# Patient Record
Sex: MALE | Race: WHITE | NOT HISPANIC OR LATINO | Employment: FULL TIME | URBAN - METROPOLITAN AREA
[De-identification: names, ages, dates, MRNs, and addresses within clinical notes are randomized per-mention and may not be internally consistent; named-entity substitution may affect disease eponyms.]

---

## 2017-05-16 ENCOUNTER — ALLSCRIPTS OFFICE VISIT (OUTPATIENT)
Dept: OTHER | Facility: OTHER | Age: 47
End: 2017-05-16

## 2017-05-16 DIAGNOSIS — E03.9 HYPOTHYROIDISM: ICD-10-CM

## 2017-05-16 DIAGNOSIS — E78.5 HYPERLIPIDEMIA: ICD-10-CM

## 2017-06-28 ENCOUNTER — APPOINTMENT (OUTPATIENT)
Dept: LAB | Facility: CLINIC | Age: 47
End: 2017-06-28
Payer: COMMERCIAL

## 2017-06-28 ENCOUNTER — TRANSCRIBE ORDERS (OUTPATIENT)
Dept: URGENT CARE | Facility: CLINIC | Age: 47
End: 2017-06-28

## 2017-06-28 DIAGNOSIS — E03.9 HYPOTHYROIDISM: ICD-10-CM

## 2017-06-28 DIAGNOSIS — E78.5 HYPERLIPIDEMIA: ICD-10-CM

## 2017-06-28 LAB
ALBUMIN SERPL BCP-MCNC: 3.9 G/DL (ref 3.5–5)
ALP SERPL-CCNC: 88 U/L (ref 46–116)
ALT SERPL W P-5'-P-CCNC: 26 U/L (ref 12–78)
ANION GAP SERPL CALCULATED.3IONS-SCNC: 4 MMOL/L (ref 4–13)
AST SERPL W P-5'-P-CCNC: 24 U/L (ref 5–45)
BILIRUB SERPL-MCNC: 0.89 MG/DL (ref 0.2–1)
BUN SERPL-MCNC: 14 MG/DL (ref 5–25)
CALCIUM SERPL-MCNC: 9.1 MG/DL (ref 8.3–10.1)
CHLORIDE SERPL-SCNC: 104 MMOL/L (ref 100–108)
CO2 SERPL-SCNC: 29 MMOL/L (ref 21–32)
CREAT SERPL-MCNC: 1.35 MG/DL (ref 0.6–1.3)
GFR SERPL CREATININE-BSD FRML MDRD: 57.2 ML/MIN/1.73SQ M
GLUCOSE P FAST SERPL-MCNC: 97 MG/DL (ref 65–99)
LDLC SERPL DIRECT ASSAY-MCNC: 124 MG/DL (ref 0–100)
POTASSIUM SERPL-SCNC: 4 MMOL/L (ref 3.5–5.3)
PROT SERPL-MCNC: 7.3 G/DL (ref 6.4–8.2)
SODIUM SERPL-SCNC: 137 MMOL/L (ref 136–145)
TRIGL SERPL-MCNC: 208 MG/DL
TSH SERPL DL<=0.05 MIU/L-ACNC: 39.8 UIU/ML (ref 0.36–3.74)

## 2017-06-28 PROCEDURE — 83721 ASSAY OF BLOOD LIPOPROTEIN: CPT

## 2017-06-28 PROCEDURE — 84478 ASSAY OF TRIGLYCERIDES: CPT

## 2017-06-28 PROCEDURE — 84443 ASSAY THYROID STIM HORMONE: CPT

## 2017-06-28 PROCEDURE — 36415 COLL VENOUS BLD VENIPUNCTURE: CPT

## 2017-06-28 PROCEDURE — 80053 COMPREHEN METABOLIC PANEL: CPT

## 2017-06-29 ENCOUNTER — GENERIC CONVERSION - ENCOUNTER (OUTPATIENT)
Dept: OTHER | Facility: OTHER | Age: 47
End: 2017-06-29

## 2017-10-03 ENCOUNTER — APPOINTMENT (OUTPATIENT)
Dept: PHYSICAL THERAPY | Facility: CLINIC | Age: 47
End: 2017-10-03
Payer: COMMERCIAL

## 2017-10-03 PROCEDURE — G8991 OTHER PT/OT GOAL STATUS: HCPCS

## 2017-10-03 PROCEDURE — G8990 OTHER PT/OT CURRENT STATUS: HCPCS

## 2017-10-03 PROCEDURE — 97161 PT EVAL LOW COMPLEX 20 MIN: CPT

## 2017-10-03 PROCEDURE — 97140 MANUAL THERAPY 1/> REGIONS: CPT

## 2017-10-03 PROCEDURE — 97535 SELF CARE MNGMENT TRAINING: CPT

## 2017-10-05 ENCOUNTER — APPOINTMENT (OUTPATIENT)
Dept: PHYSICAL THERAPY | Facility: CLINIC | Age: 47
End: 2017-10-05
Payer: COMMERCIAL

## 2017-10-05 PROCEDURE — 97110 THERAPEUTIC EXERCISES: CPT

## 2017-10-05 PROCEDURE — 97140 MANUAL THERAPY 1/> REGIONS: CPT

## 2017-10-09 ENCOUNTER — APPOINTMENT (OUTPATIENT)
Dept: PHYSICAL THERAPY | Facility: CLINIC | Age: 47
End: 2017-10-09
Payer: COMMERCIAL

## 2017-10-09 PROCEDURE — 97140 MANUAL THERAPY 1/> REGIONS: CPT

## 2017-10-09 PROCEDURE — 97110 THERAPEUTIC EXERCISES: CPT

## 2017-10-12 ENCOUNTER — APPOINTMENT (OUTPATIENT)
Dept: PHYSICAL THERAPY | Facility: CLINIC | Age: 47
End: 2017-10-12
Payer: COMMERCIAL

## 2017-10-12 PROCEDURE — 97140 MANUAL THERAPY 1/> REGIONS: CPT

## 2017-10-12 PROCEDURE — 97110 THERAPEUTIC EXERCISES: CPT

## 2017-10-16 ENCOUNTER — APPOINTMENT (OUTPATIENT)
Dept: PHYSICAL THERAPY | Facility: CLINIC | Age: 47
End: 2017-10-16
Payer: COMMERCIAL

## 2017-10-16 PROCEDURE — 97140 MANUAL THERAPY 1/> REGIONS: CPT

## 2017-10-16 PROCEDURE — 97110 THERAPEUTIC EXERCISES: CPT

## 2017-10-18 ENCOUNTER — APPOINTMENT (OUTPATIENT)
Dept: PHYSICAL THERAPY | Facility: CLINIC | Age: 47
End: 2017-10-18
Payer: COMMERCIAL

## 2017-10-18 PROCEDURE — 97140 MANUAL THERAPY 1/> REGIONS: CPT

## 2017-10-18 PROCEDURE — 97110 THERAPEUTIC EXERCISES: CPT

## 2017-10-23 ENCOUNTER — APPOINTMENT (OUTPATIENT)
Dept: PHYSICAL THERAPY | Facility: CLINIC | Age: 47
End: 2017-10-23
Payer: COMMERCIAL

## 2017-10-23 PROCEDURE — G8990 OTHER PT/OT CURRENT STATUS: HCPCS

## 2017-10-23 PROCEDURE — 97140 MANUAL THERAPY 1/> REGIONS: CPT

## 2017-10-23 PROCEDURE — 97164 PT RE-EVAL EST PLAN CARE: CPT

## 2017-10-23 PROCEDURE — 97535 SELF CARE MNGMENT TRAINING: CPT

## 2017-10-23 PROCEDURE — G8991 OTHER PT/OT GOAL STATUS: HCPCS

## 2017-10-23 PROCEDURE — G8992 OTHER PT/OT  D/C STATUS: HCPCS

## 2017-10-23 PROCEDURE — 97110 THERAPEUTIC EXERCISES: CPT

## 2017-10-26 ENCOUNTER — APPOINTMENT (OUTPATIENT)
Dept: PHYSICAL THERAPY | Facility: CLINIC | Age: 47
End: 2017-10-26
Payer: COMMERCIAL

## 2017-12-29 ENCOUNTER — ALLSCRIPTS OFFICE VISIT (OUTPATIENT)
Dept: OTHER | Facility: OTHER | Age: 47
End: 2017-12-29

## 2017-12-29 DIAGNOSIS — E03.9 HYPOTHYROIDISM: ICD-10-CM

## 2017-12-29 DIAGNOSIS — E78.5 HYPERLIPIDEMIA: ICD-10-CM

## 2017-12-29 DIAGNOSIS — D64.9 ANEMIA: ICD-10-CM

## 2017-12-29 DIAGNOSIS — Z12.5 ENCOUNTER FOR SCREENING FOR MALIGNANT NEOPLASM OF PROSTATE: ICD-10-CM

## 2017-12-30 NOTE — PROGRESS NOTES
Assessment   1  Hyperlipidemia (272 4) (E78 5)   2  Anemia (285 9) (D64 9)   · Resolved   3  Asthma (493 90) (J45 909)   4  Generalized anxiety disorder (300 02) (F41 1)   5  Hypothyroidism (244 9) (E03 9)   6  Tobacco chew use (305 1) (Z72 0)   7  Acute bronchitis (466 0) (J20 9)   8  History of Knee Arthroscopy (Therapeutic)    Plan   Acute bronchitis    · Azithromycin 200 MG/5ML Oral Suspension Reconstituted; TAKE 10 ML ON DAY 1, THEN 5 ML    DAILY ON DAYS 2 THRU 5   · Mucinex 600 MG Oral Tablet Extended Release 12 Hour; TAKE 1 TABLET EVERY 12 HOURS    AS NEEDED FOR CONGESTION   · Gargle with warm salt water for 5 minutes every 4 hours ; Status:Complete;   Done: 84ZEC3611  Anemia    · (1) CBC/PLT/DIFF; Status:Active; Requested for:57Tge4601;   Hyperlipidemia    · (1) COMPREHENSIVE METABOLIC PANEL; Status:Active; Requested for:34Fjl5819;    · (1) LIPID PANEL FASTING W DIRECT LDL REFLEX; Status:Active; Requested for:63Nzx6204;    · Eat a low fat and low cholesterol diet ; Status:Complete;   Done: 54JBP5879   · Follow-up visit in 6 months Evaluation and Treatment  Follow-up  Status: Hold For - Scheduling     Requested for: 64Oll0030  Hypothyroidism    · (1) TSH; Status:Active; Requested for:38Zgz7634;   Special screening for malignant neoplasm of prostate    · (1) PSA (SCREEN) (Dx V76 44 Screen for Prostate Cancer); Status:Active; Requested    for:42Nlq1787;     Discussion/Summary   Discussion Summary:    Doing ok and will check labs  Treat the URI  Discussed the weight and pt to start a diet and increase activity  Medication SE Review and Pt Understands Tx: Possible side effects of new medications were reviewed with the patient/guardian today  The treatment plan was reviewed with the patient/guardian  The patient/guardian understands and agrees with the treatment plan      Chief Complaint   Chief Complaint Free Text Note Form: RTN - Pt c/o PND more frequently        History of Present Illness   HPI: WM RTC for f/u hyperlipidemia, hypothyroidism, etc  Doing ok, but feels like he is getting a cold  Three days of sore throat, nasal congestion, and PND  SLight cough  Asthma has been good  Wt is up after having knee sx and being less active  Due for labs  Has a flu shot already  Anxiety Disorder (Follow-Up): The patient is being seen for follow-up of generalized anxiety disorder  The patient reports doing well  He has no comorbid illnesses  He has had no significant interval events  Interval symptoms:  stable anxiety,-- improved sleep disruption-- and-- denies depression  Medication(s): benzodiazepines  Medications:  the patient is adherent to his medication regimen, but-- he denies medication side effects  Disease management:  the patient is doing well with his goals  Asthma (Follow-Up): The patient is being seen for a routine clinic follow-up of asthma  The patient's long-term asthma pattern has been classified as intermittent  The patient states he has been doing well with his asthma control since the last visit  He has no comorbid illnesses  He has no significant interval events  Asthma Control: Well controlled  Interval Symptoms: The patient is currently asymptomatic  Associated symptoms include no chest pain or discomfort,-- not awakened at night with cough-- and-- not awakened at night because of wheezing or trouble breathing  Home Monitoring: The patient is not checking peak flow at home  -- peak flow has been good  Normal rescue inhaler use  Interval Triggers: None  Medications: a nasal steroid-- the patient is adherent with his medication regimen  -- He denies medication side effects  Disease Management: the patient is doing well with his asthma goals  Anemia (Follow-Up): The patient is being seen for follow-up of anemia  The patient reports doing well  There are no comorbid illnesses  He has had no significant interval events   The patient is currently asymptomatic  Associated symptoms: no palpitations-- and-- no chest pain  The patient is not currently on medication for this problem  Disease management:  the patient is doing well with his goals  Due for: hemoglobin  Hypothyroidism (Follow-Up): The patient is being seen for follow-up of Hashimoto's thyroiditis  The patient reports doing well  He has had no significant interval events  The patient is currently asymptomatic  Medications include levothyroxine  Medications:  the patient is adherent to his medication regimen, but-- he denies medication side effects  Disease management:  the patient is doing well with his goals  Due for: thyroid stimulating hormone and lipid profile  Hyperlipidemia (Follow-Up): The patient states his hyperlipidemia has been under good control since the last visit  He has no comorbid illnesses  He has no significant interval events  Symptoms: The patient is currently asymptomatic  Associated symptoms include no focal neurologic deficits-- and-- no memory loss  Medications: the patient is adherent with his medication regimen  -- He denies medication side effects  Medication(s): a fibrate  The patient is doing well with his hyperlipidemia goals  The patient is due for a lipid panel-- and-- liver function tests  Review of Systems   Complete-Male:      Constitutional: feeling poorly-- and-- feeling tired, but-- no fever-- and-- no chills  Eyes: No complaints of eye pain, no red eyes, no discharge from eyes, no itchy eyes  ENT: sore throat-- and-- nasal discharge, but-- no earache-- and-- no hoarseness  Cardiovascular: no chest pain,-- no intermittent leg claudication,-- no palpitations-- and-- no extremity edema  Respiratory: cough, but-- as noted in HPI,-- no shortness of breath,-- no orthopnea,-- no wheezing,-- no shortness of breath during exertion-- and-- no PND        Gastrointestinal: no abdominal pain,-- no nausea,-- no constipation-- and-- no diarrhea  Genitourinary: no dysuria  Musculoskeletal: No complaints of arthralgia, no myalgias, no joint swelling or stiffness, no limb pain or swelling  Integumentary: No complaints of skin rash or skin lesions, no itching, no skin wound, no dry skin  Neurological: no headache,-- no confusion-- and-- no convulsions  Psychiatric: anxiety, but-- no depression  Endocrine: No complaints of proptosis, no hot flashes, no muscle weakness, no erectile dysfunction, no deepening of the voice, no feelings of weakness  Hematologic/Lymphatic: No complaints of swollen glands, no swollen glands in the neck, does not bleed easily, no easy bruising  Active Problems   1  Allergic rhinitis (477 9) (J30 9)   2  Anemia (285 9) (D64 9)   3  Asthma (493 90) (J45 909)   4  Generalized anxiety disorder (300 02) (F41 1)   5  Herniated lumbar intervertebral disc (722 10) (M51 26)   6  Hyperlipidemia (272 4) (E78 5)   7  Hypokalemia (276 8) (E87 6)   8  Hypothyroidism (244 9) (E03 9)   9  Marital problem (V61 10) (Z63 0)   10  Need for influenza vaccination (V04 81) (Z23)   11  Nephrolithiasis (592 0) (N20 0)   12  Screening for diabetes mellitus (V77 1) (Z13 1)   13  Sleep disturbances (780 50) (G47 9)   14  Special screening for malignant neoplasm of prostate (V76 44) (Z12 5)   15  Tobacco chew use (305 1) (Z72 0)    Past Medical History   1  History of Acute frontal sinusitis (461 1) (J01 10)   2  History of Cough (786 2) (R05)   3  History of acute pharyngitis (V12 69) (Z87 09)   4  History of sinusitis (V12 69) (Z87 09)   5  History of Lyme disease (088 81) (A69 20)   6  History of Sinus pain (478 19) (J34 89)  Active Problems And Past Medical History Reviewed: The active problems and past medical history were reviewed and updated today  Surgical History   1  History of Denial Of Any Significant Medical History   2   History of Denial Of Any Significant Medical History  Surgical History Reviewed: The surgical history was reviewed and updated today  Family History   Mother    1  No pertinent family history  Family History Reviewed: The family history was reviewed and updated today  Social History    · Denied: History of Alcohol Use (History)   · Being A Social Drinker   · Caffeine Use   · Marital problem (V61 10) (Z63 0)   · Never A Smoker   · Tobacco chew use (305 1) (Z72 0)  Social History Reviewed: The social history was reviewed and updated today  The social history was reviewed and is unchanged  Current Meds    1  ALPRAZolam 0 5 MG Oral Tablet; TAKE 1 TABLET EVERY 8 HOURS AS NEEDED; Therapy: 95LVG7663 to (Evaluate:02Nov2016); Last Rx:83Fgw0219 Ordered   2  Fenofibrate 145 MG Oral Tablet; TAKE 1 TABLET DAILY; Therapy: 26HFL5762 to (Evaluate:28Jun2018)  Requested for: 67EPW8087; Last Rx:39Teg2082 Ordered   3  Fluticasone Propionate 50 MCG/ACT Nasal Suspension; USE 2 SPRAYS IN EACH NOSTRIL ONCE     DAILY; Therapy: 72GTS1365 to (Yuliana Worley)  Requested for: 02Nyu2336; Last Rx:49Dun6937     Ordered   4  Levothyroxine Sodium 175 MCG Oral Tablet; TAKE 1 TABLET DAILY; Therapy: 49Zfy3686 to (Celsa Pulido)  Requested for: 36XUN4649; Last Rx:44Vgh2483 Ordered   5  Meloxicam 15 MG Oral Tablet; TAKE 1 TABLET DAILY; Therapy: 63MRF4596 to (Last Rx:78Jce1215)  Requested for: 34Mpy4569 Ordered  Medication List Reviewed: The medication list was reviewed and updated today  Allergies   1  No Known Drug Allergies    Vitals   Vital Signs    Recorded: 22XPX8908 02:32PM   Temperature 98 1 F   Heart Rate 90   Respiration 16   Systolic 904   Diastolic 80   Height 5 ft 10 in   Weight 236 lb    BMI Calculated 33 86   BSA Calculated 2 24     Physical Exam        Constitutional      General appearance: No acute distress, well appearing and well nourished  Eyes      Conjunctiva and lids: No swelling, erythema, or discharge         Pupils and irises: Equal, round and reactive to light  Ears, Nose, Mouth, and Throat      External inspection of ears and nose: Normal        Otoscopic examination: Tympanic membrance translucent with normal light reflex  Canals patent without erythema  Nasal mucosa, septum, and turbinates: Normal without edema or erythema  Oropharynx: Normal with no erythema, edema, exudate or lesions  Pulmonary      Respiratory effort: No increased work of breathing or signs of respiratory distress  Auscultation of lungs: Clear to auscultation, equal breath sounds bilaterally, no wheezes, no rales, no rhonci  Cardiovascular      Auscultation of heart: Normal rate and rhythm, normal S1 and S2, without murmurs  Examination of extremities for edema and/or varicosities: Normal        Carotid pulses: Normal        Abdomen      Abdomen: Non-tender, no masses  Lymphatic      Palpation of lymph nodes in neck: No lymphadenopathy  Musculoskeletal      Gait and station: Normal        Psychiatric      Orientation to person, place and time: Normal        Mood and affect: Normal           Results/Data   PHQ-9 Adult Depression Screening 86Tyr9958 02:36PM User, [facility]      Test Name Result Flag Reference   PHQ-9 Adult Depression Score 3     Over the last two weeks, how often have you been bothered by any of the following problems?      Little interest or pleasure in doing things: Not at all - 0     Feeling down, depressed, or hopeless: Several days - 1     Trouble falling or staying asleep, or sleeping too much: Several days - 1     Feeling tired or having little energy: Several days - 1     Poor appetite or over eating: Not at all - 0     Feeling bad about yourself - or that you are a failure or have let yourself or your family down: Not at all - 0     Trouble concentrating on things, such as reading the newspaper or watching television: Not at all - 0     Moving or speaking so slowly that other people could have noticed   Or the opposite -  being so fidgety or restless that you have been moving around a lot more than usual: Not at all - 0     Thoughts that you would be better off dead, or of hurting yourself in some way: Not at all - 0   PHQ-9 Adult Depression Screening Negative     PHQ-9 Difficulty Level Not difficult at all     PHQ-9 Severity Minimal Depression          Signatures    Electronically signed by : ROMELIA Sinha ; Dec 29 2017  3:04PM EST                       (Author)

## 2018-01-10 NOTE — RESULT NOTES
Verified Results  (1) TSH 08QEQ0747 12:00PM Ron Turpin    Order Number: [id number]     Test Name Result Flag Reference   TSH 39 800 uIU/mL H 0 358-3 740   Patients undergoing fluorescein dye angiography may retain small amounts of fluorescein in the body for 48-72 hours post procedure  Samples containing fluorescein can produce falsely depressed TSH values  If the patient had this procedure,a specimen should be resubmitted post fluorescein clearance  (1) COMPREHENSIVE METABOLIC PANEL 32YHC6928 50:00MO Wanda Goldberg Order Number: [id number]     Test Name Result Flag Reference   SODIUM 137 mmol/L  136-145   POTASSIUM 4 0 mmol/L  3 5-5 3   CHLORIDE 104 mmol/L  100-108   CARBON DIOXIDE 29 mmol/L  21-32   ANION GAP (CALC) 4 mmol/L  4-13   BLOOD UREA NITROGEN 14 mg/dL  5-25   CREATININE 1 35 mg/dL H 0 60-1 30   Standardized to IDMS reference method   CALCIUM 9 1 mg/dL  8 3-10 1   BILI, TOTAL 0 89 mg/dL  0 20-1 00   ALK PHOSPHATAS 88 U/L     ALT (SGPT) 26 U/L  12-78   AST(SGOT) 24 U/L  5-45   ALBUMIN 3 9 g/dL  3 5-5 0   TOTAL PROTEIN 7 3 g/dL  6 4-8 2   eGFR Non-African American 57 2 ml/min/1 73sq [facility] Disease Education Program recommendations are as follows:  GFR calculation is accurate only with a steady state creatinine  Chronic Kidney disease less than 60 ml/min/1 73 sq  meters  Kidney failure less than 15 ml/min/1 73 sq  meters     GLUCOSE FASTING 97 mg/dL  65-99     (1) LDL,DIRECT 76MIF7164 12:00PM Wanda Goldberg Order Number: [id number]     Test Name Result Flag Reference   LDL, DIRECT 124 mg/dl H 0-100   LDL Cholesterol:        Optimal          <100 mg/dl        Near Optimal     100-129 mg/dl        Above Optimal          Borderline High   130-159 mg/dl          High              160-189 mg/dl          Very High        >189 mg/dl     (1) TRIGLYCERIDE 28Jun2017 12:00PM Wanda Goldberg Order Number: [id number]     Test Name Result Flag Reference   TRIGLYCERIDES 208 mg/dL H <=150   Specimen collection should occur prior to N-Acetylcysteine or Metamizole administration due to the potential for falsely depressed results        Triglyceride:         Normal              <150 mg/dl       Borderline High    150-199 mg/dl       High               200-499 mg/dl       Very High          >499 mg/dl

## 2018-01-12 NOTE — RESULT NOTES
Verified Results  * XR FOOT 3+ VIEW RIGHT 16Oct2016 03:12PM Aggie Brower Order Number: [id number]     Test Name Result Flag Reference   XR FOOT 3+ VW RIGHT (Report)     RIGHT FOOT     INDICATION: Soft tissue lump adjacent to the 5th metatarsal      COMPARISON: None     VIEWS: 3; 3 images     FINDINGS:     There is no acute fracture or dislocation  No degenerative changes  No lytic or blastic lesions are seen  Bone island in the distal 1st phalanx noted  Mild soft tissue swelling adjacent to the base of the 5th metatarsal        IMPRESSION:     No acute osseous abnormality       Mild soft tissue swelling adjacent to the base of the 5th metatarsal        Workstation performed: [id number]     Signed by:   Armando Hernandez MD   10/16/16

## 2018-01-13 VITALS
HEIGHT: 70 IN | RESPIRATION RATE: 14 BRPM | WEIGHT: 220 LBS | SYSTOLIC BLOOD PRESSURE: 126 MMHG | TEMPERATURE: 98.5 F | HEART RATE: 80 BPM | DIASTOLIC BLOOD PRESSURE: 80 MMHG | BODY MASS INDEX: 31.5 KG/M2

## 2018-01-17 NOTE — RESULT NOTES
Verified Results  (1) TSH 11Oct2016 08:53AM Edelmira Triplett Order Number: [id number]     Test Name Result Flag Reference   TSH 3 120 uIU/mL  0 358-3 740   - Patient Instructions: This bloodwork is non-fasting  Please drink two glasses of water morning of bloodwork  - Patient Instructions: This is a fasting blood test  Water, black tea or black coffee only after 9:00pm the night before test Drink 2 glasses of water the morning of test - Patient Instructions: This bloodwork is non-fasting  Please drink two glasses of   water morning of bloodwork  Patients undergoing fluorescein dye angiography may retain small amounts of fluorescein in the body for 48-72 hours post procedure  Samples containing fluorescein can produce falsely depressed TSH values  If the patient had this procedure,a specimen should be resubmitted post fluorescein clearance  (1) COMPREHENSIVE METABOLIC PANEL 04MOD6121 83:07ZH Edelmira Triplett Order Number: [id number]     Test Name Result Flag Reference   GLUCOSE,RANDM 102 mg/dL     If the patient is fasting, the ADA then defines impaired fasting glucose as > 100 mg/dL and diabetes as > or equal to 123 mg/dL  SODIUM 140 mmol/L  136-145   POTASSIUM 4 3 mmol/L  3 5-5 3   CHLORIDE 104 mmol/L  100-108   CARBON DIOXIDE 29 mmol/L  21-32   ANION GAP (CALC) 7 mmol/L  4-13   BLOOD UREA NITROGEN 15 mg/dL  5-25   CREATININE 1 35 mg/dL H 0 60-1 30   Standardized to IDMS reference method   CALCIUM 8 7 mg/dL  8 3-10 1   BILI, TOTAL 0 50 mg/dL  0 20-1 00   ALK PHOSPHATAS 84 U/L     ALT (SGPT) 27 U/L  12-78   AST(SGOT) 19 U/L  5-45   ALBUMIN 3 9 g/dL  3 5-5 0   TOTAL PROTEIN 7 5 g/dL  6 4-8 2   eGFR Non-African American 57 2 ml/min/1 73sq m     - Patient Instructions: This is a fasting blood test  Water, black tea or black coffee only after 9:00pm the night before test Drink 2 glasses of water the morning of test - Patient Instructions:  This bloodwork is non-fasting  Please drink two glasses of   water morning of bloodwork  National Kidney Disease Education Program recommendations are as follows:  GFR calculation is accurate only with a steady state creatinine  Chronic Kidney disease less than 60 ml/min/1 73 sq  meters  Kidney failure less than 15 ml/min/1 73 sq  meters  (1) CBC/PLT/DIFF 11Oct2016 08:53HEAVEN Don Order Number: [id number]     Test Name Result Flag Reference   WBC COUNT 7 07 Thousand/uL  4 31-10 16   RBC COUNT 4 73 Million/uL  3 88-5 62   HEMOGLOBIN 14 2 g/dL  12 0-17 0   HEMATOCRIT 43 3 %  36 5-49 3   MCV 92 fL  82-98   MCH 30 0 pg  26 8-34 3   MCHC 32 8 g/dL  31 4-37 4   RDW 12 8 %  11 6-15 1   MPV 12 1 fL  8 9-12 7   PLATELET COUNT 076 Thousands/uL  149-390   nRBC AUTOMATED 0 /100 WBCs     NEUTROPHILS RELATIVE PERCENT 66 %  43-75   LYMPHOCYTES RELATIVE PERCENT 22 %  14-44   MONOCYTES RELATIVE PERCENT 7 %  4-12   EOSINOPHILS RELATIVE PERCENT 4 %  0-6   BASOPHILS RELATIVE PERCENT 1 %  0-1   NEUTROPHILS ABSOLUTE COUNT 4 66 Thousands/?L  1 85-7 62   LYMPHOCYTES ABSOLUTE COUNT 1 58 Thousands/?L  0 60-4 47   MONOCYTES ABSOLUTE COUNT 0 48 Thousand/?L  0 17-1 22   EOSINOPHILS ABSOLUTE COUNT 0 29 Thousand/?L  0 00-0 61   BASOPHILS ABSOLUTE COUNT 0 04 Thousands/?L  0 00-0 10   - Patient Instructions: This bloodwork is non-fasting  Please drink two glasses of water morning of bloodwork  - Patient Instructions: This bloodwork is non-fasting  Please drink two glasses of water morning of bloodwork  (1) LIPID PANEL FASTING W DIRECT LDL REFLEX 11Oct2016 08:53HEAVEN Don Order Number: [id number]     Test Name Result Flag Reference   CHOLESTEROL 203 mg/dL H    LDL CHOLESTEROL CALCULATED 107 mg/dL H 0-100   - Patient Instructions:  This is a fasting blood test  Water, black tea or black coffee only after 9:00pm the night before test   Drink 2 glasses of water the morning of test     - Patient Instructions: This is a fasting blood test  Water, black tea or black coffee only after 9:00pm the night before test Drink 2 glasses of water the morning of test - Patient Instructions: This bloodwork is non-fasting  Please drink two glasses of   water morning of bloodwork  Triglyceride:         Normal              <150 mg/dl       Borderline High    150-199 mg/dl       High               200-499 mg/dl       Very High          >499 mg/dl  Cholesterol:         Desirable        <200 mg/dl      Borderline High  200-239 mg/dl      High             >239 mg/dl  HDL Cholesterol:        High    >59 mg/dL      Low     <41 mg/dL  LDL Cholesterol:        Optimal          <100 mg/dl        Near Optimal     100-129 mg/dl        Above Optimal          Borderline High   130-159 mg/dl          High              160-189 mg/dl          Very High        >189 mg/dl  LDL CALCULATED:    This screening LDL is a calculated result  It does not have the accuracy of the Direct Measured LDL in the monitoring of patients with hyperlipidemia and/or statin therapy  Direct Measure LDL (KMW794) must be ordered separately in these patients  TRIGLYCERIDES 174 mg/dL H <=150   Specimen collection should occur prior to N-Acetylcysteine or Metamizole administration due to the potential for falsely depressed results  HDL,DIRECT 61 mg/dL H 40-60   Specimen collection should occur prior to Metamizole administration due to the potential for falsely depressed results  (1) PSA (SCREEN) (Dx V76 44 Screen for Prostate Cancer) 69QQB7742 08:53AM Art Katey Order Number: [id number]     Test Name Result Flag Reference   PROSTATE SPECIFIC ANTIGEN 0 6 ng/mL  0 0-4 0   American Urological Association Guidelines define biochemical recurrence of prostate cancer as a detectable or rising PSA value post-radical prostatectomy that is greater than or equal to 0 2 ng/mL with a second confirmatory level of greater than or equal to 0 2 ng/mL     - Patient Instructions: This test is non-fasting  Please drink two glasses of water morning of bloodwork  - Patient Instructions: This test is non-fasting  Please drink two glasses of water morning of bloodwork

## 2018-01-17 NOTE — PROGRESS NOTES
Assessment    1  Encounter for preventive health examination (V70 0) (Z00 00)    Discussion/Summary  Impression: health maintenance visit  Currently, he eats a healthy diet and has an adequate exercise regimen  Prostate cancer screening: the risks and benefits of prostate cancer screening were discussed  Testicular cancer screening: the risks and benefits of testicular cancer screening were discussed and monthly self testicular exam was advised  Colorectal cancer screening: colorectal cancer screening is not indicated  Pt is doing well overall  Physical form completed  RTO as needed  The patient was counseled regarding instructions for management, risk factor reductions, patient and family education, risks and benefits of treatment options, importance of compliance with treatment  Chief Complaint  Pt here for work physical      History of Present Illness  HM, Adult Male: The patient is being seen for a health maintenance evaluation  The last health maintenance visit was 1 year(s) ago  Social History: Household members include spouse, daughter(s) and son(s)  He is   Work status: working full time  The patient has never smoked cigarettes  He reports rare alcohol use  He has never used illicit drugs  General Health: The patient's health since the last visit is described as good  He has regular dental visits  He denies vision problems  (color blind  )   He denies hearing loss  Immunizations status: up to date  Lifestyle:  He consumes a diverse and healthy diet  He does not have any weight concerns  He exercises regularly  He denies alcohol use  He denies drug use  Reproductive health:  the patient is sexually active  He denies erectile dysfunction  Screening: cancer screening reviewed and updated  metabolic screening reviewed and updated  risk screening reviewed and updated        Review of Systems    Constitutional: No fever or chills, feels well, no tiredness, no recent weight gain or weight loss  Eyes: No complaints of eye pain, no red eyes, no discharge from eyes, no itchy eyes  ENT: no complaints of earache, no hearing loss, no nosebleeds, no nasal discharge, no sore throat, no hoarseness  Cardiovascular: No complaints of slow heart rate, no fast heart rate, no chest pain, no palpitations, no leg claudication, no lower extremity  Respiratory: No complaints of shortness of breath, no wheezing, no cough, no SOB on exertion, no orthopnea or PND  Gastrointestinal: No complaints of abdominal pain, no constipation, no nausea or vomiting, no diarrhea or bloody stools  Genitourinary: No complaints of dysuria, no incontinence, no hesitancy, no nocturia, no genital lesion, no testicular pain  Musculoskeletal: No complaints of arthralgia, no myalgias, no joint swelling or stiffness, no limb pain or swelling  Integumentary: No complaints of skin rash or skin lesions, no itching, no skin wound, no dry skin  Neurological: No compliants of headache, no confusion, no convulsions, no numbness or tingling, no dizziness or fainting, no limb weakness, no difficulty walking  Psychiatric: Is not suicidal, no sleep disturbances, no anxiety or depression, no change in personality, no emotional problems  Endocrine: No complaints of proptosis, no hot flashes, no muscle weakness, no erectile dysfunction, no deepening of the voice, no feelings of weakness  Hematologic/Lymphatic: No complaints of swollen glands, no swollen glands in the neck, does not bleed easily, no easy bruising  ROS reviewed  Active Problems    1  Allergic rhinitis (477 9) (J30 9)   2  Anemia (285 9) (D64 9)   3  Asthma (493 90) (J45 909)   4  Herniated lumbar intervertebral disc (722 10) (M51 26)   5  Hyperlipidemia (272 4) (E78 5)   6  Hypokalemia (276 8) (E87 6)   7  Hypothyroidism (244 9) (E03 9)   8  Need for influenza vaccination (V04 81) (Z23)   9  Nephrolithiasis (592 0) (N20 0)   10   Skin lesion (709 9) (L98 9)   11  Tobacco chew use (305 1) (Z72 0)    Past Medical History    · History of Acute frontal sinusitis (461 1) (J01 10)   · History of Cough (786 2) (R05)   · History of acute pharyngitis (V12 69) (Z87 09)   · History of sinusitis (V12 69) (Z87 09)   · History of Lyme disease (088 81) (A69 20)   · History of Sinus pain (478 19) (J34 89)    Surgical History    · History of Denial Of Any Significant Medical History   · History of Denial Of Any Significant Medical History    Family History    · No pertinent family history    Social History    · Denied: History of Alcohol Use (History)   · Being A Social Drinker   · Caffeine Use   · Never A Smoker   · Tobacco chew use (305 1) (Z72 0)    Current Meds   1  Fluticasone Propionate 50 MCG/ACT Nasal Suspension; USE 2 SPRAYS IN EACH   NOSTRIL ONCE DAILY; Therapy: 47ZDT7211 to (Renew:14Uuh3116)  Requested for: 39YMM5481; Last   Rx:[id number] Ordered   2  Levothyroxine Sodium 175 MCG Oral Tablet; TAKE 1 TABLET DAILY AS DIRECTED; Therapy: 55Uzv7055 to (Evaluate:72Biw7485)  Requested for: 97XWA9339; Last   Rx:[id number] Ordered   3  Multi Vitamin/Minerals Oral Tablet; TAKE 1 TABLET DAILY; Therapy: 73YCT0230 to (Evaluate:16Mar2013) Recorded   4  Potassium Citrate ER 10 MEQ (1080 MG) Oral Tablet Extended Release; TAKE 1 TABLET   DAILY AS DIRECTED; Therapy: 72JDJ5252 to (Evaluate:16Mar2013) Recorded   5  Vascepa 1 GM Oral Capsule; TAKE 2 CAPSULE Twice daily; Therapy: 25VNF3942 to (Evaluate:13Bmy5901)  Requested for: 73XVV8002; Last   Rx:[id number] Ordered    Allergies    1  No Known Drug Allergies    Vitals   Recorded: 46OCA2010 03:45PM   Temperature 97 9 F   Heart Rate 78   Respiration 18   Systolic 828   Diastolic 82   Height 5 ft 10 in   Weight 210 lb    BMI Calculated 30 13   BSA Calculated 2 13     Physical Exam    Constitutional   General appearance: No acute distress, well appearing and well nourished      Head and Face   Head and face: Normal     Palpation of the face and sinuses: No sinus tenderness  Eyes   Conjunctiva and lids: No erythema, swelling or discharge  Ophthalmoscopic examination: Normal fundi and optic discs  Ears, Nose, Mouth, and Throat   External inspection of ears and nose: Normal     Otoscopic examination: Tympanic membranes translucent with normal light reflex  Canals patent without erythema  Hearing: Normal     Nasal mucosa, septum, and turbinates: Normal without edema or erythema  Lips, teeth, and gums: Normal, good dentition  Oropharynx: Normal with no erythema, edema, exudate or lesions  Neck   Neck: Supple, symmetric, trachea midline, no masses  Thyroid: Normal, no thyromegaly  Pulmonary   Respiratory effort: No increased work of breathing or signs of respiratory distress  Auscultation of lungs: Clear to auscultation  Cardiovascular   Auscultation of heart: Normal rate and rhythm, normal S1 and S2, no murmurs  Carotid pulses: 2+ bilaterally  Examination of extremities for edema and/or varicosities: Normal     Abdomen   Abdomen: Non-tender, no masses  Musculoskeletal   Gait and station: Normal     Inspection/palpation of digits and nails: Normal without clubbing or cyanosis  Inspection/palpation of joints, bones, and muscles: Normal     Range of motion: Normal     Stability: Normal     Muscle strength/tone: Normal     Skin   Skin and subcutaneous tissue: Normal without rashes or lesions  Palpation of skin and subcutaneous tissue: Normal turgor  Psychiatric   Judgment and insight: Normal     Orientation to person, place and time: Normal     Recent and remote memory: Intact  Mood and affect: Normal        Attending Note  Collaborating Physician Note: Collaborating Physician: I agree with the Advanced Practitioner note  Future Appointments    Date/Time Provider Specialty Site   03/29/2016 03:30 PM ROMELIA Barry   Internal Medicine [facility]     Signatures Electronically signed by : Renny Rubin, [facility]; Feb 10 2016  8:41PM EST                       (Author)    Electronically signed by : Edith Haddad DO; Feb 11 2016  7:50AM EST                       (Co-author)

## 2018-01-23 VITALS
WEIGHT: 236 LBS | BODY MASS INDEX: 33.79 KG/M2 | TEMPERATURE: 98.1 F | HEART RATE: 90 BPM | RESPIRATION RATE: 16 BRPM | HEIGHT: 70 IN | DIASTOLIC BLOOD PRESSURE: 80 MMHG | SYSTOLIC BLOOD PRESSURE: 130 MMHG

## 2018-05-23 ENCOUNTER — APPOINTMENT (OUTPATIENT)
Dept: LAB | Facility: CLINIC | Age: 48
End: 2018-05-23
Payer: COMMERCIAL

## 2018-05-23 DIAGNOSIS — E03.9 HYPOTHYROIDISM: ICD-10-CM

## 2018-05-23 DIAGNOSIS — E78.5 HYPERLIPIDEMIA: ICD-10-CM

## 2018-05-23 DIAGNOSIS — D64.9 ANEMIA: ICD-10-CM

## 2018-05-23 DIAGNOSIS — Z12.5 ENCOUNTER FOR SCREENING FOR MALIGNANT NEOPLASM OF PROSTATE: ICD-10-CM

## 2018-05-23 LAB
ALBUMIN SERPL BCP-MCNC: 3.9 G/DL (ref 3.5–5)
ALP SERPL-CCNC: 98 U/L (ref 46–116)
ALT SERPL W P-5'-P-CCNC: 26 U/L (ref 12–78)
ANION GAP SERPL CALCULATED.3IONS-SCNC: 6 MMOL/L (ref 4–13)
AST SERPL W P-5'-P-CCNC: 16 U/L (ref 5–45)
BASOPHILS # BLD AUTO: 0.05 THOUSANDS/ΜL (ref 0–0.1)
BASOPHILS NFR BLD AUTO: 1 % (ref 0–1)
BILIRUB SERPL-MCNC: 0.93 MG/DL (ref 0.2–1)
BUN SERPL-MCNC: 18 MG/DL (ref 5–25)
CALCIUM SERPL-MCNC: 9.2 MG/DL (ref 8.3–10.1)
CHLORIDE SERPL-SCNC: 106 MMOL/L (ref 100–108)
CHOLEST SERPL-MCNC: 177 MG/DL (ref 50–200)
CO2 SERPL-SCNC: 27 MMOL/L (ref 21–32)
CREAT SERPL-MCNC: 1.27 MG/DL (ref 0.6–1.3)
EOSINOPHIL # BLD AUTO: 0.2 THOUSAND/ΜL (ref 0–0.61)
EOSINOPHIL NFR BLD AUTO: 3 % (ref 0–6)
ERYTHROCYTE [DISTWIDTH] IN BLOOD BY AUTOMATED COUNT: 13.1 % (ref 11.6–15.1)
GFR SERPL CREATININE-BSD FRML MDRD: 67 ML/MIN/1.73SQ M
GLUCOSE P FAST SERPL-MCNC: 102 MG/DL (ref 65–99)
HCT VFR BLD AUTO: 43.6 % (ref 36.5–49.3)
HDLC SERPL-MCNC: 51 MG/DL (ref 40–60)
HGB BLD-MCNC: 14.4 G/DL (ref 12–17)
LDLC SERPL CALC-MCNC: 89 MG/DL (ref 0–100)
LYMPHOCYTES # BLD AUTO: 1.68 THOUSANDS/ΜL (ref 0.6–4.47)
LYMPHOCYTES NFR BLD AUTO: 24 % (ref 14–44)
MCH RBC QN AUTO: 29.7 PG (ref 26.8–34.3)
MCHC RBC AUTO-ENTMCNC: 33 G/DL (ref 31.4–37.4)
MCV RBC AUTO: 90 FL (ref 82–98)
MONOCYTES # BLD AUTO: 0.36 THOUSAND/ΜL (ref 0.17–1.22)
MONOCYTES NFR BLD AUTO: 5 % (ref 4–12)
NEUTROPHILS # BLD AUTO: 4.68 THOUSANDS/ΜL (ref 1.85–7.62)
NEUTS SEG NFR BLD AUTO: 67 % (ref 43–75)
NRBC BLD AUTO-RTO: 0 /100 WBCS
PLATELET # BLD AUTO: 246 THOUSANDS/UL (ref 149–390)
PMV BLD AUTO: 12.9 FL (ref 8.9–12.7)
POTASSIUM SERPL-SCNC: 4.2 MMOL/L (ref 3.5–5.3)
PROT SERPL-MCNC: 7.7 G/DL (ref 6.4–8.2)
PSA SERPL-MCNC: 0.4 NG/ML (ref 0–4)
RBC # BLD AUTO: 4.85 MILLION/UL (ref 3.88–5.62)
SODIUM SERPL-SCNC: 139 MMOL/L (ref 136–145)
TRIGL SERPL-MCNC: 186 MG/DL
TSH SERPL DL<=0.05 MIU/L-ACNC: 1.72 UIU/ML (ref 0.36–3.74)
WBC # BLD AUTO: 6.99 THOUSAND/UL (ref 4.31–10.16)

## 2018-05-23 PROCEDURE — 85025 COMPLETE CBC W/AUTO DIFF WBC: CPT

## 2018-05-23 PROCEDURE — G0103 PSA SCREENING: HCPCS

## 2018-05-23 PROCEDURE — 80061 LIPID PANEL: CPT

## 2018-05-23 PROCEDURE — 84443 ASSAY THYROID STIM HORMONE: CPT

## 2018-05-23 PROCEDURE — 36415 COLL VENOUS BLD VENIPUNCTURE: CPT

## 2018-05-23 PROCEDURE — 80053 COMPREHEN METABOLIC PANEL: CPT

## 2018-05-30 DIAGNOSIS — E03.9 HYPOTHYROIDISM, UNSPECIFIED TYPE: Primary | ICD-10-CM

## 2018-05-30 DIAGNOSIS — E78.2 MIXED HYPERLIPIDEMIA: Primary | ICD-10-CM

## 2018-05-30 RX ORDER — LEVOTHYROXINE SODIUM 175 UG/1
175 TABLET ORAL DAILY
Qty: 90 TABLET | Refills: 3 | Status: SHIPPED | OUTPATIENT
Start: 2018-05-30 | End: 2019-01-02 | Stop reason: SDUPTHER

## 2018-05-30 RX ORDER — OMEGA-3-ACID ETHYL ESTERS 1 G/1
2 CAPSULE, LIQUID FILLED ORAL 2 TIMES DAILY
Qty: 180 CAPSULE | Refills: 0 | Status: SHIPPED | OUTPATIENT
Start: 2018-05-30 | End: 2018-09-28 | Stop reason: SDUPTHER

## 2018-05-30 RX ORDER — LEVOTHYROXINE SODIUM 175 UG/1
1 TABLET ORAL DAILY
COMMUNITY
Start: 2012-02-23 | End: 2018-05-30 | Stop reason: SDUPTHER

## 2018-06-29 RX ORDER — ALPRAZOLAM 0.5 MG/1
1 TABLET ORAL EVERY 8 HOURS PRN
COMMUNITY
Start: 2016-10-03 | End: 2022-04-08

## 2018-06-29 RX ORDER — FLUTICASONE PROPIONATE 50 MCG
2 SPRAY, SUSPENSION (ML) NASAL DAILY
COMMUNITY
Start: 2014-02-27 | End: 2019-01-02

## 2018-06-29 RX ORDER — POTASSIUM CITRATE 10 MEQ/1
1 TABLET, EXTENDED RELEASE ORAL DAILY
COMMUNITY
Start: 2013-02-14 | End: 2018-07-02

## 2018-06-29 RX ORDER — FENOFIBRATE 145 MG/1
1 TABLET, COATED ORAL DAILY
COMMUNITY
Start: 2017-07-03 | End: 2018-07-02

## 2018-06-29 RX ORDER — DOXYCYCLINE 100 MG/1
1 CAPSULE ORAL 2 TIMES DAILY
COMMUNITY
Start: 2017-05-09 | End: 2018-07-02

## 2018-06-29 RX ORDER — MELOXICAM 15 MG/1
1 TABLET ORAL DAILY
COMMUNITY
Start: 2016-08-03 | End: 2019-07-02

## 2018-06-29 RX ORDER — TERBINAFINE HYDROCHLORIDE 250 MG/1
TABLET ORAL
COMMUNITY
Start: 2016-11-01 | End: 2018-07-02

## 2018-06-29 RX ORDER — ICOSAPENT ETHYL 1000 MG/1
CAPSULE ORAL 2 TIMES DAILY
COMMUNITY
Start: 2016-01-04 | End: 2018-07-02

## 2018-07-02 ENCOUNTER — OFFICE VISIT (OUTPATIENT)
Dept: INTERNAL MEDICINE CLINIC | Facility: CLINIC | Age: 48
End: 2018-07-02
Payer: COMMERCIAL

## 2018-07-02 VITALS
HEART RATE: 72 BPM | BODY MASS INDEX: 32.35 KG/M2 | WEIGHT: 226 LBS | DIASTOLIC BLOOD PRESSURE: 78 MMHG | HEIGHT: 70 IN | TEMPERATURE: 98 F | SYSTOLIC BLOOD PRESSURE: 128 MMHG | RESPIRATION RATE: 16 BRPM

## 2018-07-02 DIAGNOSIS — F41.1 GENERALIZED ANXIETY DISORDER: ICD-10-CM

## 2018-07-02 DIAGNOSIS — E03.8 HYPOTHYROIDISM DUE TO HASHIMOTO'S THYROIDITIS: ICD-10-CM

## 2018-07-02 DIAGNOSIS — E06.3 HYPOTHYROIDISM DUE TO HASHIMOTO'S THYROIDITIS: ICD-10-CM

## 2018-07-02 DIAGNOSIS — E78.2 MIXED HYPERLIPIDEMIA: Primary | ICD-10-CM

## 2018-07-02 DIAGNOSIS — J45.20 MILD INTERMITTENT ASTHMA WITHOUT COMPLICATION: ICD-10-CM

## 2018-07-02 DIAGNOSIS — J30.9 ALLERGIC RHINITIS, UNSPECIFIED SEASONALITY, UNSPECIFIED TRIGGER: ICD-10-CM

## 2018-07-02 DIAGNOSIS — D64.9 ANEMIA, UNSPECIFIED TYPE: ICD-10-CM

## 2018-07-02 PROCEDURE — 99214 OFFICE O/P EST MOD 30 MIN: CPT | Performed by: INTERNAL MEDICINE

## 2018-07-02 PROCEDURE — 3008F BODY MASS INDEX DOCD: CPT | Performed by: INTERNAL MEDICINE

## 2018-07-02 PROCEDURE — 1036F TOBACCO NON-USER: CPT | Performed by: INTERNAL MEDICINE

## 2018-07-02 NOTE — PATIENT INSTRUCTIONS

## 2018-07-02 NOTE — PROGRESS NOTES
Assessment/Plan:    No problem-specific Assessment & Plan notes found for this encounter  Diagnoses and all orders for this visit:    Mixed hyperlipidemia    Hypothyroidism due to Hashimoto's thyroiditis    Mild intermittent asthma without complication    Allergic rhinitis, unspecified seasonality, unspecified trigger    Generalized anxiety disorder    Anemia, unspecified type    Other orders  -     Discontinue: Icosapent Ethyl (VASCEPA) 1 g CAPS; Take by mouth Twice daily  -     Discontinue: terbinafine (LamISIL) 250 mg tablet; Take by mouth  -     Discontinue: potassium citrate (UROCIT-K) 10 mEq; Take 1 tablet by mouth daily  -     Multiple Vitamins-Minerals (MULTI-VITAMIN/MINERALS PO); Take 1 tablet by mouth daily  -     meloxicam (MOBIC) 15 mg tablet; Take 1 tablet by mouth daily  -     fluticasone (FLONASE) 50 mcg/act nasal spray; 2 sprays into each nostril daily  -     Discontinue: fenofibrate (TRICOR) 145 mg tablet; Take 1 tablet by mouth daily  -     Discontinue: doxycycline monohydrate (MONODOX) 100 mg capsule; Take 1 capsule by mouth 2 (two) times a day  -     ALPRAZolam (XANAX) 0 5 mg tablet; Take 1 tablet by mouth every 8 (eight) hours as needed      A/P: Doing well and labs up to date  Will continue current treatment and pt told to continue with sun screen and remain hydrated  RTC six months for routine  Subjective:      Patient ID: Mikayla Lares is a 56 y o  male  WM RTC for f/u hypothyroidism, hyperlipidemia, etc  Doing well and no c/o's  Recent labs acceptable except for elevated TG and just started omega 3  Asthma well controlled and no usage of MDI  Remains active w/o difficulty and no falls reported  The following portions of the patient's history were reviewed and updated as appropriate:   He  has a past medical history of Lyme disease    He   Patient Active Problem List    Diagnosis Date Noted    Generalized anxiety disorder 10/03/2016    Sleep disturbances 10/03/2016    Hyperlipidemia 03/26/2015    Herniated lumbar intervertebral disc 08/14/2014    Allergic rhinitis 02/27/2014    Anemia 02/14/2013    Nephrolithiasis 02/14/2013    Asthma 07/13/2012    Hypothyroidism 07/13/2012     He  has a past surgical history that includes Knee arthroscopy  His family history includes No Known Problems in his father and mother  He  reports that he has never smoked  His smokeless tobacco use includes Chew  He reports that he drinks alcohol  His drug history is not on file  Current Outpatient Prescriptions   Medication Sig Dispense Refill    ALPRAZolam (XANAX) 0 5 mg tablet Take 1 tablet by mouth every 8 (eight) hours as needed      fluticasone (FLONASE) 50 mcg/act nasal spray 2 sprays into each nostril daily      meloxicam (MOBIC) 15 mg tablet Take 1 tablet by mouth daily      Multiple Vitamins-Minerals (MULTI-VITAMIN/MINERALS PO) Take 1 tablet by mouth daily      levothyroxine 175 mcg tablet Take 1 tablet (175 mcg total) by mouth daily 90 tablet 3    omega-3-acid ethyl esters (LOVAZA) 1 g capsule Take 2 capsules (2 g total) by mouth 2 (two) times a day 180 capsule 0     No current facility-administered medications for this visit  Current Outpatient Prescriptions on File Prior to Visit   Medication Sig    levothyroxine 175 mcg tablet Take 1 tablet (175 mcg total) by mouth daily    omega-3-acid ethyl esters (LOVAZA) 1 g capsule Take 2 capsules (2 g total) by mouth 2 (two) times a day     No current facility-administered medications on file prior to visit  He has No Known Allergies       Review of Systems   Constitutional: Negative for activity change, chills, diaphoresis, fatigue and fever  HENT: Negative  Eyes: Negative for visual disturbance  Respiratory: Negative for cough, chest tightness, shortness of breath and wheezing  Cardiovascular: Negative for chest pain, palpitations and leg swelling     Gastrointestinal: Negative for abdominal pain, constipation, diarrhea, nausea and vomiting  Endocrine: Negative for cold intolerance and heat intolerance  Genitourinary: Negative for difficulty urinating, dysuria and frequency  Musculoskeletal: Negative for arthralgias, gait problem and myalgias  Neurological: Negative for dizziness, weakness, light-headedness, numbness and headaches  Psychiatric/Behavioral: Negative for confusion and dysphoric mood  The patient is not nervous/anxious  Objective:      /78   Pulse 72   Temp 98 °F (36 7 °C)   Resp 16   Ht 5' 10" (1 778 m)   Wt 103 kg (226 lb)   BMI 32 43 kg/m²          Physical Exam   Constitutional: He is oriented to person, place, and time  He appears well-developed and well-nourished  No distress  HENT:   Head: Normocephalic and atraumatic  Mouth/Throat: Oropharynx is clear and moist    Eyes: Conjunctivae and EOM are normal  Pupils are equal, round, and reactive to light  Neck: Normal range of motion  Neck supple  No JVD present  Cardiovascular: Normal rate, regular rhythm and normal heart sounds  No murmur heard  Pulmonary/Chest: Effort normal and breath sounds normal  No respiratory distress  He has no wheezes  Abdominal: Soft  Bowel sounds are normal  He exhibits no distension  There is no tenderness  Musculoskeletal: He exhibits no edema  Neurological: He is alert and oriented to person, place, and time  Psychiatric: He has a normal mood and affect  His behavior is normal  Judgment and thought content normal    Nursing note and vitals reviewed

## 2018-09-28 DIAGNOSIS — E78.2 MIXED HYPERLIPIDEMIA: ICD-10-CM

## 2018-09-28 RX ORDER — OMEGA-3-ACID ETHYL ESTERS 1 G/1
CAPSULE, LIQUID FILLED ORAL
Qty: 120 CAPSULE | Refills: 0 | Status: SHIPPED | OUTPATIENT
Start: 2018-09-28 | End: 2018-12-06 | Stop reason: SDUPTHER

## 2018-12-06 DIAGNOSIS — E78.2 MIXED HYPERLIPIDEMIA: ICD-10-CM

## 2018-12-06 RX ORDER — OMEGA-3-ACID ETHYL ESTERS 1 G/1
CAPSULE, LIQUID FILLED ORAL
Qty: 120 CAPSULE | Refills: 0 | Status: SHIPPED | OUTPATIENT
Start: 2018-12-06 | End: 2019-01-02 | Stop reason: SDUPTHER

## 2019-01-02 ENCOUNTER — OFFICE VISIT (OUTPATIENT)
Dept: INTERNAL MEDICINE CLINIC | Facility: CLINIC | Age: 49
End: 2019-01-02
Payer: COMMERCIAL

## 2019-01-02 VITALS
HEART RATE: 60 BPM | TEMPERATURE: 97.8 F | WEIGHT: 227 LBS | RESPIRATION RATE: 18 BRPM | OXYGEN SATURATION: 98 % | HEIGHT: 70 IN | BODY MASS INDEX: 32.5 KG/M2 | SYSTOLIC BLOOD PRESSURE: 118 MMHG | DIASTOLIC BLOOD PRESSURE: 78 MMHG

## 2019-01-02 DIAGNOSIS — Z13.1 SCREENING FOR DIABETES MELLITUS: ICD-10-CM

## 2019-01-02 DIAGNOSIS — D64.9 ANEMIA, UNSPECIFIED TYPE: ICD-10-CM

## 2019-01-02 DIAGNOSIS — F41.1 GENERALIZED ANXIETY DISORDER: ICD-10-CM

## 2019-01-02 DIAGNOSIS — J45.20 MILD INTERMITTENT ASTHMA WITHOUT COMPLICATION: Primary | ICD-10-CM

## 2019-01-02 DIAGNOSIS — J30.9 ALLERGIC RHINITIS, UNSPECIFIED SEASONALITY, UNSPECIFIED TRIGGER: ICD-10-CM

## 2019-01-02 DIAGNOSIS — E03.9 HYPOTHYROIDISM, UNSPECIFIED TYPE: ICD-10-CM

## 2019-01-02 DIAGNOSIS — Z23 ENCOUNTER FOR VACCINATION: ICD-10-CM

## 2019-01-02 DIAGNOSIS — E78.2 MIXED HYPERLIPIDEMIA: ICD-10-CM

## 2019-01-02 DIAGNOSIS — G47.9 SLEEP DISTURBANCES: ICD-10-CM

## 2019-01-02 PROCEDURE — 90471 IMMUNIZATION ADMIN: CPT | Performed by: INTERNAL MEDICINE

## 2019-01-02 PROCEDURE — 3008F BODY MASS INDEX DOCD: CPT | Performed by: INTERNAL MEDICINE

## 2019-01-02 PROCEDURE — 1036F TOBACCO NON-USER: CPT | Performed by: INTERNAL MEDICINE

## 2019-01-02 PROCEDURE — 99214 OFFICE O/P EST MOD 30 MIN: CPT | Performed by: INTERNAL MEDICINE

## 2019-01-02 PROCEDURE — 90715 TDAP VACCINE 7 YRS/> IM: CPT

## 2019-01-02 RX ORDER — FLUTICASONE PROPIONATE 50 MCG
1 SPRAY, SUSPENSION (ML) NASAL DAILY
Qty: 16 G | Refills: 0 | Status: SHIPPED | OUTPATIENT
Start: 2019-01-02

## 2019-01-02 RX ORDER — OMEGA-3-ACID ETHYL ESTERS 1 G/1
1 CAPSULE, LIQUID FILLED ORAL 2 TIMES DAILY
Qty: 180 CAPSULE | Refills: 1 | Status: SHIPPED | OUTPATIENT
Start: 2019-01-02 | End: 2019-07-02

## 2019-01-02 RX ORDER — LEVOTHYROXINE SODIUM 175 UG/1
175 TABLET ORAL DAILY
Qty: 90 TABLET | Refills: 1 | Status: SHIPPED | OUTPATIENT
Start: 2019-01-02 | End: 2019-03-08

## 2019-01-02 NOTE — PROGRESS NOTES
Assessment/Plan:    No problem-specific Assessment & Plan notes found for this encounter  Diagnoses and all orders for this visit:    Mild intermittent asthma without complication    Hypothyroidism, unspecified type  -     TSH, 3rd generation with Free T4 reflex; Future  -     levothyroxine 175 mcg tablet; Take 1 tablet (175 mcg total) by mouth daily    Mixed hyperlipidemia  -     Comprehensive metabolic panel; Future  -     LDL cholesterol, direct; Future  -     Triglycerides; Future  -     omega-3-acid ethyl esters (LOVAZA) 1 g capsule; Take 1 capsule (1 g total) by mouth 2 (two) times a day    Allergic rhinitis, unspecified seasonality, unspecified trigger  -     fluticasone (FLONASE) 50 mcg/act nasal spray; 1 spray into each nostril daily    Generalized anxiety disorder    Sleep disturbances    Anemia, unspecified type    Screening for diabetes mellitus  -     Hemoglobin A1C; Future    Encounter for vaccination  -     TDAP VACCINE GREATER THAN OR EQUAL TO 6YO IM      A/P: Doing well and will check labs  Will try INS at bedtime  Will up date Td  Continue current treatment and RTC six months  Subjective:      Patient ID: Donis Duane is a 53 y o  male  WM RTC for f/u allegies, asthma, etc  Doing well and no c/o's  Remains active w/o difficulty  No falls  Asthma well controlled  Some allergies upon awakening  AZALEA is controlled  Due for labs and vaccines  The following portions of the patient's history were reviewed and updated as appropriate:   He  has a past medical history of Anemia; Asthma; Disease of thyroid gland; Generalized anxiety disorder; and Lyme disease    He   Patient Active Problem List    Diagnosis Date Noted    Generalized anxiety disorder 10/03/2016    Sleep disturbances 10/03/2016    Hyperlipidemia 03/26/2015    Herniated lumbar intervertebral disc 08/14/2014    Allergic rhinitis 02/27/2014    Anemia 02/14/2013    Nephrolithiasis 02/14/2013    Asthma 07/13/2012  Hypothyroidism 07/13/2012     He  has a past surgical history that includes Knee arthroscopy  His family history includes No Known Problems in his father and mother  He  reports that he has never smoked  His smokeless tobacco use includes Chew  He reports that he drinks alcohol  He reports that he does not use drugs  Current Outpatient Prescriptions   Medication Sig Dispense Refill    ALPRAZolam (XANAX) 0 5 mg tablet Take 1 tablet by mouth every 8 (eight) hours as needed      levothyroxine 175 mcg tablet Take 1 tablet (175 mcg total) by mouth daily 90 tablet 1    meloxicam (MOBIC) 15 mg tablet Take 1 tablet by mouth daily      Multiple Vitamins-Minerals (MULTI-VITAMIN/MINERALS PO) Take 1 tablet by mouth daily      omega-3-acid ethyl esters (LOVAZA) 1 g capsule Take 1 capsule (1 g total) by mouth 2 (two) times a day 180 capsule 1    fluticasone (FLONASE) 50 mcg/act nasal spray 1 spray into each nostril daily 16 g 0     No current facility-administered medications for this visit  Current Outpatient Prescriptions on File Prior to Visit   Medication Sig    ALPRAZolam (XANAX) 0 5 mg tablet Take 1 tablet by mouth every 8 (eight) hours as needed    meloxicam (MOBIC) 15 mg tablet Take 1 tablet by mouth daily    Multiple Vitamins-Minerals (MULTI-VITAMIN/MINERALS PO) Take 1 tablet by mouth daily    [DISCONTINUED] fluticasone (FLONASE) 50 mcg/act nasal spray 2 sprays into each nostril daily    [DISCONTINUED] levothyroxine 175 mcg tablet Take 1 tablet (175 mcg total) by mouth daily    [DISCONTINUED] omega-3-acid ethyl esters (LOVAZA) 1 g capsule TAKE (2) CAPSULES TWICE DAILY  No current facility-administered medications on file prior to visit  He has No Known Allergies       Review of Systems   Constitutional: Negative for activity change, chills, diaphoresis, fatigue and fever  HENT: Positive for postnasal drip and rhinorrhea   Negative for congestion, sinus pain, sinus pressure and sore throat  Eyes: Negative for visual disturbance  Respiratory: Negative for cough, chest tightness, shortness of breath and wheezing  Cardiovascular: Negative for chest pain, palpitations and leg swelling  Gastrointestinal: Negative for abdominal pain, constipation, diarrhea, nausea and vomiting  Endocrine: Negative for cold intolerance and heat intolerance  Genitourinary: Negative for difficulty urinating, dysuria and frequency  Musculoskeletal: Negative for arthralgias, gait problem and myalgias  Neurological: Negative for dizziness, seizures, syncope, weakness, light-headedness and headaches  Psychiatric/Behavioral: Negative for confusion, dysphoric mood and sleep disturbance  The patient is not nervous/anxious  Objective:      /78 (BP Location: Right arm, Patient Position: Sitting, Cuff Size: Adult)   Pulse 60   Temp 97 8 °F (36 6 °C) (Tympanic)   Resp 18   Ht 5' 10" (1 778 m)   Wt 103 kg (227 lb)   SpO2 98%   BMI 32 57 kg/m²          Physical Exam   Constitutional: He is oriented to person, place, and time  He appears well-developed and well-nourished  No distress  HENT:   Head: Normocephalic and atraumatic  Mouth/Throat: Oropharynx is clear and moist    Eyes: Pupils are equal, round, and reactive to light  Conjunctivae and EOM are normal    Neck: Neck supple  No JVD present  Cardiovascular: Normal rate, regular rhythm and normal heart sounds  No murmur heard  Pulmonary/Chest: Effort normal and breath sounds normal  No respiratory distress  He has no wheezes  He has no rales  Abdominal: Soft  Bowel sounds are normal  He exhibits no distension  There is no tenderness  Musculoskeletal: He exhibits no edema  Neurological: He is alert and oriented to person, place, and time  Psychiatric: He has a normal mood and affect  His behavior is normal  Judgment and thought content normal    Nursing note and vitals reviewed

## 2019-01-02 NOTE — PATIENT INSTRUCTIONS
Hypothyroidism   AMBULATORY CARE:   Hypothyroidism  is a condition that develops when the thyroid gland does not make enough thyroid hormone  Thyroid hormones help control body temperature, heart rate, growth, and weight  Common signs and symptoms include the following: The signs and symptoms may develop slowly, sometimes over several years  · Exhaustion    · Sensitivity to cold    · Headaches or decreased concentration    · Muscle aches or weakness    · Constipation     · Dry, flaky skin or brittle nails    · Thinning hair    · Heavy or irregular monthly periods    · Depression or irritability  Call 911 for any of the following:   · You have sudden chest pain or shortness of breath  · You have a seizure  · You feel like you are going to faint  Seek care immediately if:   · You have diarrhea, tremors, or trouble sleeping  · Your legs, ankles, or feet are swollen  Contact your healthcare provider if:   · You have a fever  · You have chills, a cough, or feel weak and achy  · You have pain and swelling in your muscles and joints  · Your skin is itchy, swollen, or you have a rash  · Your signs and symptoms return or get worse, even after treatment  · You have questions or concerns about your condition or care  Treatment:  Thyroid hormone replacement medicine may bring your thyroid hormone level back to normal  Ask your healthcare provider for more information on other medicines you may need  Follow up with your healthcare provider as directed: You may need to return for more blood tests to check your thyroid hormone level  This will show if you are getting the right amount of thyroid medicine  Write down your questions so you remember to ask them during your visits  © 2017 [employer]  Information is for End User's use only and may not be sold, redistributed or otherwise used for commercial purposes   All illustrations and images included in CareNotes® are the copyrighted property of [employer]  or [employer]  The above information is an  only  It is not intended as medical advice for individual conditions or treatments  Talk to your doctor, nurse or pharmacist before following any medical regimen to see if it is safe and effective for you

## 2019-01-09 ENCOUNTER — OFFICE VISIT (OUTPATIENT)
Dept: URGENT CARE | Facility: CLINIC | Age: 49
End: 2019-01-09
Payer: COMMERCIAL

## 2019-01-09 VITALS
OXYGEN SATURATION: 99 % | SYSTOLIC BLOOD PRESSURE: 135 MMHG | DIASTOLIC BLOOD PRESSURE: 70 MMHG | RESPIRATION RATE: 18 BRPM | TEMPERATURE: 98.3 F | HEART RATE: 65 BPM

## 2019-01-09 DIAGNOSIS — R68.89 FLU-LIKE SYMPTOMS: Primary | ICD-10-CM

## 2019-01-09 PROCEDURE — 99203 OFFICE O/P NEW LOW 30 MIN: CPT | Performed by: PHYSICIAN ASSISTANT

## 2019-01-09 PROCEDURE — 87631 RESP VIRUS 3-5 TARGETS: CPT | Performed by: PHYSICIAN ASSISTANT

## 2019-01-09 RX ORDER — OSELTAMIVIR PHOSPHATE 75 MG/1
75 CAPSULE ORAL 2 TIMES DAILY
Qty: 10 CAPSULE | Refills: 0 | Status: SHIPPED | OUTPATIENT
Start: 2019-01-09 | End: 2019-01-14

## 2019-01-09 NOTE — LETTER
January 9, 2019     Patient: Jorden Mazariegos   YOB: 1970   Date of Visit: 1/9/2019       To Whom it May Concern:    Bree Brooks was seen in my clinic on 1/9/2019  He should not return to work until 1/11/19  If you have any questions or concerns, please don't hesitate to call           Sincerely,          Gita Monzon PA-C        CC: No Recipients

## 2019-01-09 NOTE — PROGRESS NOTES
[facility]    NAME: Lila Aguilar is a 53 y o  male  : 1970    MRN: [id number]  DATE: 2019  TIME: 12:03 PM    Assessment and Plan   Flu-like symptoms [R68 89]  1  Flu-like symptoms  oseltamivir (TAMIFLU) 75 mg capsule    INFLUENZA A/B AND RSV, PCR       Patient Instructions     Patient Instructions   Infection appears viral   Recommend symptomatic treatment  Can take ibuprofen or tylenol as needed for pain or fever  Over the counter cough and cold medications to help with symptoms  Use salt water gargles for sore throat and throat lozenges  Cough drops as needed  Wash hands frequently to prevent the spread of infection  If not improving over the next 7-10 days, follow up with PCP  Symptoms may persist for 10-14 days  Chief Complaint     Chief Complaint   Patient presents with    Cough     Pt c/o cough, congestion, and fever since Monday  History of Present Illness   27-year-old male here with complaint cough, body aches and fever and runny nose that started 2 days ago  Yesterday was very tired and fatigued  Cough is dry and nonproductive  Slight sore throat  Review of Systems   Review of Systems   Constitutional: Positive for chills, fatigue and fever  Negative for activity change, appetite change, diaphoresis and unexpected weight change  HENT: Positive for congestion, rhinorrhea and sore throat  Negative for ear pain, hearing loss, sinus pressure, sneezing and tinnitus  Eyes: Negative for photophobia, redness and visual disturbance  Respiratory: Positive for cough  Negative for apnea, chest tightness, shortness of breath, wheezing and stridor  Cardiovascular: Negative for chest pain, palpitations and leg swelling  Gastrointestinal: Negative for abdominal distention, abdominal pain, blood in stool, constipation, diarrhea, nausea and vomiting     Endocrine: Negative for cold intolerance, heat intolerance, polydipsia, polyphagia and polyuria  Genitourinary: Negative for difficulty urinating, dysuria, flank pain, hematuria and urgency  Musculoskeletal: Positive for myalgias  Negative for arthralgias, back pain, gait problem, neck pain and neck stiffness  Skin: Negative for rash and wound  Neurological: Negative for dizziness, tremors, seizures, syncope, weakness, light-headedness, numbness and headaches  Hematological: Negative for adenopathy  Does not bruise/bleed easily  Psychiatric/Behavioral: Negative for agitation, behavioral problems, confusion and decreased concentration  The patient is not nervous/anxious  All other systems reviewed and are negative        Current Medications     Current Outpatient Prescriptions:     ALPRAZolam (XANAX) 0 5 mg tablet, Take 1 tablet by mouth every 8 (eight) hours as needed, Disp: , Rfl:     fluticasone (FLONASE) 50 mcg/act nasal spray, 1 spray into each nostril daily, Disp: 16 g, Rfl: 0    levothyroxine 175 mcg tablet, Take 1 tablet (175 mcg total) by mouth daily, Disp: 90 tablet, Rfl: 1    meloxicam (MOBIC) 15 mg tablet, Take 1 tablet by mouth daily, Disp: , Rfl:     Multiple Vitamins-Minerals (MULTI-VITAMIN/MINERALS PO), Take 1 tablet by mouth daily, Disp: , Rfl:     omega-3-acid ethyl esters (LOVAZA) 1 g capsule, Take 1 capsule (1 g total) by mouth 2 (two) times a day, Disp: 180 capsule, Rfl: 1    oseltamivir (TAMIFLU) 75 mg capsule, Take 1 capsule (75 mg total) by mouth 2 (two) times a day for 5 days, Disp: 10 capsule, Rfl: 0    Current Allergies     Allergies as of 01/09/2019    (No Known Allergies)          The following portions of the patient's history were reviewed and updated as appropriate: allergies, current medications, past family history, past medical history, past social history, past surgical history and problem list    Past Medical History:   Diagnosis Date    Anemia     Asthma     Disease of thyroid gland     Generalized anxiety disorder     Lyme disease POSITIVE EXPOSURE WITH TREATMENT     Past Surgical History:   Procedure Laterality Date    KNEE ARTHROSCOPY      LAST ASSESSED: [id number]     Family History   Problem Relation Age of Onset    No Known Problems Mother     No Known Problems Father      Social History     Social History    Marital status: /Civil Union     Spouse name: N/A    Number of children: N/A    Years of education: N/A     Occupational History    Not on file  Social History Main Topics    Smoking status: Never Smoker    Smokeless tobacco: Current User     Types: Chew    Alcohol use Yes      Comment: SOCIAL     Drug use: No    Sexual activity: Not on file     Other Topics Concern    Not on file     Social History Narrative    Caffeine use    Marital problem         Medications have been verified  Objective   /70   Pulse 65   Temp 98 3 °F (36 8 °C)   Resp 18   SpO2 99%      Physical Exam   Physical Exam   Constitutional: He appears well-developed and well-nourished  No distress  HENT:   Head: Normocephalic  Right Ear: Tympanic membrane and external ear normal    Left Ear: Tympanic membrane and external ear normal    Nose: Mucosal edema present  Mouth/Throat: Posterior oropharyngeal erythema present  No oropharyngeal exudate or posterior oropharyngeal edema  Neck: Normal range of motion  Neck supple  Cardiovascular: Normal rate, regular rhythm and normal heart sounds  No murmur heard  Pulmonary/Chest: Effort normal and breath sounds normal  No respiratory distress  He has no wheezes  He has no rales  Abdominal: Soft  Bowel sounds are normal  There is no tenderness  Musculoskeletal: Normal range of motion  Lymphadenopathy:     He has no cervical adenopathy  Skin: Skin is warm  No rash noted  Nursing note and vitals reviewed

## 2019-01-10 LAB
FLUAV AG SPEC QL: NORMAL
FLUBV AG SPEC QL: NORMAL
RSV B RNA SPEC QL NAA+PROBE: NORMAL

## 2019-03-07 ENCOUNTER — TRANSCRIBE ORDERS (OUTPATIENT)
Dept: LAB | Facility: CLINIC | Age: 49
End: 2019-03-07

## 2019-03-07 ENCOUNTER — APPOINTMENT (OUTPATIENT)
Dept: LAB | Facility: CLINIC | Age: 49
End: 2019-03-07
Payer: COMMERCIAL

## 2019-03-07 DIAGNOSIS — E78.2 MIXED HYPERLIPIDEMIA: ICD-10-CM

## 2019-03-07 DIAGNOSIS — E03.9 HYPOTHYROIDISM, UNSPECIFIED TYPE: ICD-10-CM

## 2019-03-07 DIAGNOSIS — Z13.1 SCREENING FOR DIABETES MELLITUS: ICD-10-CM

## 2019-03-07 LAB
ALBUMIN SERPL BCP-MCNC: 3.8 G/DL (ref 3.5–5)
ALP SERPL-CCNC: 92 U/L (ref 46–116)
ALT SERPL W P-5'-P-CCNC: 21 U/L (ref 12–78)
ANION GAP SERPL CALCULATED.3IONS-SCNC: 5 MMOL/L (ref 4–13)
AST SERPL W P-5'-P-CCNC: 17 U/L (ref 5–45)
BILIRUB SERPL-MCNC: 0.54 MG/DL (ref 0.2–1)
BUN SERPL-MCNC: 22 MG/DL (ref 5–25)
CALCIUM SERPL-MCNC: 8.9 MG/DL (ref 8.3–10.1)
CHLORIDE SERPL-SCNC: 108 MMOL/L (ref 100–108)
CO2 SERPL-SCNC: 26 MMOL/L (ref 21–32)
CREAT SERPL-MCNC: 1.4 MG/DL (ref 0.6–1.3)
GFR SERPL CREATININE-BSD FRML MDRD: 59 ML/MIN/1.73SQ M
GLUCOSE P FAST SERPL-MCNC: 98 MG/DL (ref 65–99)
LDLC SERPL DIRECT ASSAY-MCNC: 127 MG/DL (ref 0–100)
POTASSIUM SERPL-SCNC: 4.5 MMOL/L (ref 3.5–5.3)
PROT SERPL-MCNC: 7.3 G/DL (ref 6.4–8.2)
SODIUM SERPL-SCNC: 139 MMOL/L (ref 136–145)
T4 FREE SERPL-MCNC: 0.96 NG/DL (ref 0.76–1.46)
TRIGL SERPL-MCNC: 123 MG/DL
TSH SERPL DL<=0.05 MIU/L-ACNC: 10.2 UIU/ML (ref 0.36–3.74)

## 2019-03-07 PROCEDURE — 84439 ASSAY OF FREE THYROXINE: CPT

## 2019-03-07 PROCEDURE — 83036 HEMOGLOBIN GLYCOSYLATED A1C: CPT

## 2019-03-07 PROCEDURE — 80053 COMPREHEN METABOLIC PANEL: CPT

## 2019-03-07 PROCEDURE — 84478 ASSAY OF TRIGLYCERIDES: CPT

## 2019-03-07 PROCEDURE — 36415 COLL VENOUS BLD VENIPUNCTURE: CPT

## 2019-03-07 PROCEDURE — 83721 ASSAY OF BLOOD LIPOPROTEIN: CPT

## 2019-03-07 PROCEDURE — 84443 ASSAY THYROID STIM HORMONE: CPT

## 2019-03-08 DIAGNOSIS — E06.3 HYPOTHYROIDISM DUE TO HASHIMOTO'S THYROIDITIS: Primary | ICD-10-CM

## 2019-03-08 DIAGNOSIS — E03.9 ACQUIRED HYPOTHYROIDISM: Primary | ICD-10-CM

## 2019-03-08 DIAGNOSIS — E03.8 HYPOTHYROIDISM DUE TO HASHIMOTO'S THYROIDITIS: Primary | ICD-10-CM

## 2019-03-08 LAB
EST. AVERAGE GLUCOSE BLD GHB EST-MCNC: 120 MG/DL
HBA1C MFR BLD: 5.8 % (ref 4.2–6.3)

## 2019-03-08 RX ORDER — LEVOTHYROXINE SODIUM 0.2 MG/1
200 TABLET ORAL
Qty: 90 TABLET | Refills: 0 | Status: SHIPPED | OUTPATIENT
Start: 2019-03-08 | End: 2019-07-23 | Stop reason: SDUPTHER

## 2019-04-09 ENCOUNTER — APPOINTMENT (EMERGENCY)
Dept: CT IMAGING | Facility: HOSPITAL | Age: 49
End: 2019-04-09
Payer: COMMERCIAL

## 2019-04-09 ENCOUNTER — HOSPITAL ENCOUNTER (EMERGENCY)
Facility: HOSPITAL | Age: 49
Discharge: HOME | End: 2019-04-09
Attending: EMERGENCY MEDICINE | Admitting: EMERGENCY MEDICINE
Payer: COMMERCIAL

## 2019-04-09 VITALS
OXYGEN SATURATION: 99 % | WEIGHT: 215 LBS | BODY MASS INDEX: 30.78 KG/M2 | DIASTOLIC BLOOD PRESSURE: 96 MMHG | RESPIRATION RATE: 18 BRPM | HEART RATE: 49 BPM | SYSTOLIC BLOOD PRESSURE: 183 MMHG | HEIGHT: 70 IN | TEMPERATURE: 97.2 F

## 2019-04-09 DIAGNOSIS — N20.1 URETEROLITHIASIS: Primary | ICD-10-CM

## 2019-04-09 LAB
ALBUMIN SERPL BCP-MCNC: 4.5 G/DL (ref 3.5–5.7)
ALP SERPL-CCNC: 86 U/L (ref 40–150)
ALT SERPL W P-5'-P-CCNC: 14 U/L (ref 7–52)
ANION GAP SERPL CALCULATED.3IONS-SCNC: 8 MMOL/L (ref 4–13)
AST SERPL W P-5'-P-CCNC: 17 U/L (ref 13–39)
BACTERIA UR QL AUTO: ABNORMAL /HPF
BASOPHILS # BLD AUTO: 0.1 THOUSANDS/ΜL (ref 0–0.1)
BASOPHILS NFR BLD AUTO: 1 % (ref 0–2)
BILIRUB SERPL-MCNC: 0.8 MG/DL (ref 0.2–1)
BILIRUB UR QL STRIP: NEGATIVE
BUN SERPL-MCNC: 16 MG/DL (ref 7–25)
CALCIUM SERPL-MCNC: 9.3 MG/DL (ref 8.6–10.5)
CHLORIDE SERPL-SCNC: 101 MMOL/L (ref 98–107)
CLARITY UR: CLEAR
CO2 SERPL-SCNC: 27 MMOL/L (ref 21–31)
COLOR UR: YELLOW
CREAT SERPL-MCNC: 1.71 MG/DL (ref 0.7–1.3)
EOSINOPHIL # BLD AUTO: 0.5 THOUSAND/ΜL (ref 0–0.61)
EOSINOPHIL NFR BLD AUTO: 6 % (ref 0–5)
ERYTHROCYTE [DISTWIDTH] IN BLOOD BY AUTOMATED COUNT: 13.2 % (ref 11.5–14.5)
GFR SERPL CREATININE-BSD FRML MDRD: 47 ML/MIN/1.73SQ M
GLUCOSE SERPL-MCNC: 121 MG/DL (ref 65–99)
GLUCOSE UR STRIP-MCNC: NEGATIVE MG/DL
HCT VFR BLD AUTO: 43 % (ref 42–47)
HGB BLD-MCNC: 14.7 G/DL (ref 14–18)
HGB UR QL STRIP.AUTO: ABNORMAL
KETONES UR STRIP-MCNC: NEGATIVE MG/DL
LEUKOCYTE ESTERASE UR QL STRIP: NEGATIVE
LIPASE SERPL-CCNC: 26 U/L (ref 11–82)
LYMPHOCYTES # BLD AUTO: 0.9 THOUSANDS/ΜL (ref 0.6–4.47)
LYMPHOCYTES NFR BLD AUTO: 12 % (ref 21–51)
MAGNESIUM SERPL-MCNC: 2.1 MG/DL (ref 1.9–2.7)
MCH RBC QN AUTO: 30.8 PG (ref 26–34)
MCHC RBC AUTO-ENTMCNC: 34.2 G/DL (ref 31–37)
MCV RBC AUTO: 90 FL (ref 81–99)
MONOCYTES # BLD AUTO: 0.6 THOUSAND/ΜL (ref 0.17–1.22)
MONOCYTES NFR BLD AUTO: 9 % (ref 2–12)
MUCOUS THREADS UR QL AUTO: ABNORMAL
NEUTROPHILS # BLD AUTO: 5.3 THOUSANDS/ΜL (ref 1.4–6.5)
NEUTS SEG NFR BLD AUTO: 72 % (ref 42–75)
NITRITE UR QL STRIP: NEGATIVE
NON-SQ EPI CELLS URNS QL MICRO: ABNORMAL /HPF
PH UR STRIP.AUTO: 7 [PH]
PLATELET # BLD AUTO: 209 THOUSANDS/UL (ref 149–390)
PMV BLD AUTO: 9.6 FL (ref 8.6–11.7)
POTASSIUM SERPL-SCNC: 4.4 MMOL/L (ref 3.5–5.5)
PROT SERPL-MCNC: 7.4 G/DL (ref 6.4–8.9)
PROT UR STRIP-MCNC: NEGATIVE MG/DL
RBC # BLD AUTO: 4.79 MILLION/UL (ref 4.3–5.9)
RBC #/AREA URNS AUTO: ABNORMAL /HPF
SODIUM SERPL-SCNC: 136 MMOL/L (ref 134–143)
SP GR UR STRIP.AUTO: 1.01 (ref 1–1.03)
UROBILINOGEN UR QL STRIP.AUTO: 0.2 E.U./DL
WBC # BLD AUTO: 7.4 THOUSAND/UL (ref 4.8–10.8)
WBC #/AREA URNS AUTO: ABNORMAL /HPF

## 2019-04-09 PROCEDURE — 96374 THER/PROPH/DIAG INJ IV PUSH: CPT

## 2019-04-09 PROCEDURE — 74176 CT ABD & PELVIS W/O CONTRAST: CPT

## 2019-04-09 PROCEDURE — 85025 COMPLETE CBC W/AUTO DIFF WBC: CPT | Performed by: EMERGENCY MEDICINE

## 2019-04-09 PROCEDURE — 96375 TX/PRO/DX INJ NEW DRUG ADDON: CPT

## 2019-04-09 PROCEDURE — 83735 ASSAY OF MAGNESIUM: CPT | Performed by: EMERGENCY MEDICINE

## 2019-04-09 PROCEDURE — 96361 HYDRATE IV INFUSION ADD-ON: CPT

## 2019-04-09 PROCEDURE — 99284 EMERGENCY DEPT VISIT MOD MDM: CPT

## 2019-04-09 PROCEDURE — 81001 URINALYSIS AUTO W/SCOPE: CPT | Performed by: EMERGENCY MEDICINE

## 2019-04-09 PROCEDURE — 83690 ASSAY OF LIPASE: CPT | Performed by: EMERGENCY MEDICINE

## 2019-04-09 PROCEDURE — 36415 COLL VENOUS BLD VENIPUNCTURE: CPT | Performed by: EMERGENCY MEDICINE

## 2019-04-09 PROCEDURE — 80053 COMPREHEN METABOLIC PANEL: CPT | Performed by: EMERGENCY MEDICINE

## 2019-04-09 RX ORDER — IBUPROFEN 600 MG/1
600 TABLET ORAL EVERY 6 HOURS PRN
Qty: 20 TABLET | Refills: 0 | Status: SHIPPED | OUTPATIENT
Start: 2019-04-09 | End: 2020-01-30

## 2019-04-09 RX ORDER — KETOROLAC TROMETHAMINE 30 MG/ML
15 INJECTION, SOLUTION INTRAMUSCULAR; INTRAVENOUS ONCE
Status: COMPLETED | OUTPATIENT
Start: 2019-04-09 | End: 2019-04-09

## 2019-04-09 RX ORDER — TAMSULOSIN HYDROCHLORIDE 0.4 MG/1
0.4 CAPSULE ORAL ONCE
Status: COMPLETED | OUTPATIENT
Start: 2019-04-09 | End: 2019-04-09

## 2019-04-09 RX ORDER — ONDANSETRON 2 MG/ML
4 INJECTION INTRAMUSCULAR; INTRAVENOUS ONCE
Status: COMPLETED | OUTPATIENT
Start: 2019-04-09 | End: 2019-04-09

## 2019-04-09 RX ORDER — OXYCODONE HYDROCHLORIDE AND ACETAMINOPHEN 5; 325 MG/1; MG/1
1 TABLET ORAL EVERY 6 HOURS PRN
Qty: 12 TABLET | Refills: 0 | Status: SHIPPED | OUTPATIENT
Start: 2019-04-09 | End: 2019-07-02

## 2019-04-09 RX ORDER — MORPHINE SULFATE 10 MG/ML
6 INJECTION, SOLUTION INTRAMUSCULAR; INTRAVENOUS ONCE
Status: COMPLETED | OUTPATIENT
Start: 2019-04-09 | End: 2019-04-09

## 2019-04-09 RX ADMIN — ONDANSETRON 4 MG: 2 INJECTION INTRAMUSCULAR; INTRAVENOUS at 09:17

## 2019-04-09 RX ADMIN — SODIUM CHLORIDE 1000 ML: 0.9 INJECTION, SOLUTION INTRAVENOUS at 11:02

## 2019-04-09 RX ADMIN — TAMSULOSIN HYDROCHLORIDE 0.4 MG: 0.4 CAPSULE ORAL at 10:24

## 2019-04-09 RX ADMIN — FAMOTIDINE 20 MG: 10 INJECTION INTRAVENOUS at 09:21

## 2019-04-09 RX ADMIN — MORPHINE SULFATE 6 MG: 10 INJECTION INTRAVENOUS at 10:26

## 2019-04-09 RX ADMIN — KETOROLAC TROMETHAMINE 15 MG: 30 INJECTION, SOLUTION INTRAMUSCULAR; INTRAVENOUS at 09:19

## 2019-04-09 RX ADMIN — SODIUM CHLORIDE 1000 ML: 0.9 INJECTION, SOLUTION INTRAVENOUS at 09:17

## 2019-07-02 ENCOUNTER — OFFICE VISIT (OUTPATIENT)
Dept: INTERNAL MEDICINE CLINIC | Facility: CLINIC | Age: 49
End: 2019-07-02
Payer: COMMERCIAL

## 2019-07-02 VITALS
BODY MASS INDEX: 33.01 KG/M2 | WEIGHT: 230.6 LBS | DIASTOLIC BLOOD PRESSURE: 84 MMHG | HEIGHT: 70 IN | RESPIRATION RATE: 18 BRPM | OXYGEN SATURATION: 98 % | HEART RATE: 79 BPM | SYSTOLIC BLOOD PRESSURE: 130 MMHG | TEMPERATURE: 97.8 F

## 2019-07-02 DIAGNOSIS — D64.9 ANEMIA, UNSPECIFIED TYPE: ICD-10-CM

## 2019-07-02 DIAGNOSIS — E03.8 HYPOTHYROIDISM DUE TO HASHIMOTO'S THYROIDITIS: ICD-10-CM

## 2019-07-02 DIAGNOSIS — E06.3 HYPOTHYROIDISM DUE TO HASHIMOTO'S THYROIDITIS: ICD-10-CM

## 2019-07-02 DIAGNOSIS — J45.20 MILD INTERMITTENT ASTHMA WITHOUT COMPLICATION: ICD-10-CM

## 2019-07-02 DIAGNOSIS — Z12.5 SCREENING FOR PROSTATE CANCER: ICD-10-CM

## 2019-07-02 DIAGNOSIS — F41.1 GENERALIZED ANXIETY DISORDER: ICD-10-CM

## 2019-07-02 DIAGNOSIS — G47.9 SLEEP DISTURBANCES: ICD-10-CM

## 2019-07-02 DIAGNOSIS — E78.2 MIXED HYPERLIPIDEMIA: Primary | ICD-10-CM

## 2019-07-02 PROCEDURE — 1036F TOBACCO NON-USER: CPT | Performed by: INTERNAL MEDICINE

## 2019-07-02 PROCEDURE — 3008F BODY MASS INDEX DOCD: CPT | Performed by: INTERNAL MEDICINE

## 2019-07-02 PROCEDURE — 99214 OFFICE O/P EST MOD 30 MIN: CPT | Performed by: INTERNAL MEDICINE

## 2019-07-02 NOTE — PROGRESS NOTES
Assessment/Plan:  Problem List Items Addressed This Visit        Endocrine    Hypothyroidism    Relevant Orders    TSH, 3rd generation       Respiratory    Asthma       Other    Anemia    Generalized anxiety disorder    Hyperlipidemia - Primary    Relevant Orders    Comprehensive metabolic panel    Sleep disturbances      Other Visit Diagnoses     Screening for prostate cancer        Relevant Orders    PSA, Total Screen           Diagnoses and all orders for this visit:    Mixed hyperlipidemia  -     Comprehensive metabolic panel; Future    Hypothyroidism due to Hashimoto's thyroiditis  -     TSH, 3rd generation; Future    Mild intermittent asthma without complication    Anemia, unspecified type    Generalized anxiety disorder    Sleep disturbances    Screening for prostate cancer  -     PSA, Total Screen; Future        No problem-specific Assessment & Plan notes found for this encounter  A/P: Doing well and will check labs  Discussed BMI and will give information on diet and exercise  Continue current treatment and RTC six months for routine  Subjective:      Patient ID: Shreyas Lane is a 53 y o  male  WM RTC for f/u hyperlipidemia, hypothyroidism, etc  Doing well and no new issues  Remains active w/o falls  Asthma is well controlled and minimal rescue MDI use  AZALEA is stable  Due for labs  The following portions of the patient's history were reviewed and updated as appropriate:   He has a past medical history of Anemia, Asthma, Chronic kidney disease, Disease of thyroid gland, Generalized anxiety disorder, Kidney stone, and Lyme disease  ,  does not have any pertinent problems on file  ,   has a past surgical history that includes Knee arthroscopy  ,  family history includes No Known Problems in his father and mother  ,   reports that he has never smoked  His smokeless tobacco use includes chew  He reports that he drinks alcohol  He reports that he does not use drugs  ,  has No Known Allergies     Current Outpatient Medications   Medication Sig Dispense Refill    ALPRAZolam (XANAX) 0 5 mg tablet Take 1 tablet by mouth every 8 (eight) hours as needed      fluticasone (FLONASE) 50 mcg/act nasal spray 1 spray into each nostril daily 16 g 0    ibuprofen (MOTRIN) 600 mg tablet Take 1 tablet (600 mg total) by mouth every 6 (six) hours as needed for mild pain or moderate pain 20 tablet 0    levothyroxine 200 mcg tablet Take 1 tablet (200 mcg total) by mouth daily in the early morning 90 tablet 0     No current facility-administered medications for this visit  Review of Systems   Constitutional: Negative for activity change, chills, diaphoresis, fatigue and fever  HENT: Negative  Eyes: Negative for visual disturbance  Respiratory: Negative for cough, chest tightness, shortness of breath and wheezing  Cardiovascular: Negative for chest pain, palpitations and leg swelling  Gastrointestinal: Negative for abdominal pain, constipation, diarrhea, nausea and vomiting  Endocrine: Negative for cold intolerance and heat intolerance  Genitourinary: Negative for difficulty urinating, dysuria and frequency  Musculoskeletal: Negative for arthralgias, gait problem and myalgias  Neurological: Negative for dizziness, seizures, syncope, weakness, light-headedness and headaches  Psychiatric/Behavioral: Negative for confusion, dysphoric mood and sleep disturbance  The patient is not nervous/anxious  Objective:  Vitals:    07/02/19 1300   BP: 130/84   BP Location: Left arm   Patient Position: Sitting   Cuff Size: Large   Pulse: 79   Resp: 18   Temp: 97 8 °F (36 6 °C)   SpO2: 98%   Weight: 105 kg (230 lb 9 6 oz)   Height: 5' 10" (1 778 m)     Body mass index is 33 09 kg/m²  Physical Exam   Constitutional: He is oriented to person, place, and time  He appears well-developed and well-nourished  No distress  HENT:   Head: Normocephalic and atraumatic     Mouth/Throat: Oropharynx is clear and moist    Eyes: Pupils are equal, round, and reactive to light  Conjunctivae and EOM are normal    Neck: Neck supple  No JVD present  Cardiovascular: Normal rate, regular rhythm and normal heart sounds  Pulmonary/Chest: Effort normal and breath sounds normal  No respiratory distress  He has no wheezes  He has no rales  Abdominal: Soft  Bowel sounds are normal  He exhibits no distension  There is no tenderness  Musculoskeletal: He exhibits no edema  Neurological: He is alert and oriented to person, place, and time  Psychiatric: He has a normal mood and affect  His behavior is normal  Judgment and thought content normal    Nursing note and vitals reviewed  BMI Counseling: Body mass index is 33 09 kg/m²  Discussed the patient's BMI with him  The BMI is above average  BMI counseling and education was provided to the patient  Nutrition recommendations include reducing portion sizes and decreasing overall calorie intake  Exercise recommendations include moderate aerobic physical activity for 150 minutes/week

## 2019-07-02 NOTE — PATIENT INSTRUCTIONS
Hyperlipidemia   WHAT YOU NEED TO KNOW:   What is hyperlipidemia? Hyperlipidemia is a high level of lipids (fats) in your blood  These lipids include cholesterol or triglycerides  Lipids are made by your body  They also come from the foods you eat  Your body needs lipids to work properly, but high levels increase your risk for heart disease, heart attack, and stroke  What increases my risk for hyperlipidemia? · Family history of high lipid levels    · Diet high in saturated fats, cholesterol, or calories     · High alcohol intake or smoking    · Lack of regular physical activity    · Medical conditions such as hypothyroidism, obesity, or type 2 diabetes    · Certain medicines, such as blood pressure medicines, hormones, and steroids  How is hyperlipidemia managed and treated? Your healthcare provider may first recommend that you make lifestyle changes to help decrease your lipid levels  You may also need to take medicine to lower your lipid levels  Some of the lifestyle changes you may need to make include the following:  · Maintain a healthy weight  Ask your healthcare provider how much you should weigh  Ask him or her to help you create a weight loss plan if you are overweight  Weight loss can decrease your cholesterol and triglyceride levels  · Exercise as directed  Exercise lowers your cholesterol levels and helps you maintain a healthy weight  Get 40 minutes or more of moderate exercise 3 to 4 days each week  You can split your exercise into four 10-minute workouts instead of 40 minutes at one time  Examples of moderate exercises include walking briskly, swimming, or riding a bike  Work with your healthcare provider to plan the best exercise program for you  · Do not smoke  Nicotine and other chemicals in cigarettes and cigars can increase your risk for a heart attack and stroke  Ask your healthcare provider for information if you currently smoke and need help to quit   E-cigarettes or smokeless tobacco still contain nicotine  Talk to your healthcare provider before you use these products  · Eat heart-healthy foods  Talk to your dietitian about a heart-healthy diet  The following will help you manage hyperlipidemia:     ¨ Decrease the total amount of fat you eat  Choose lean meats, fat-free or 1% fat milk, and low-fat dairy products, such as yogurt and cheese  Limit or do not eat red meat  Red meats are high in fat and cholesterol  ¨ Replace unhealthy fats with healthy fats  Unhealthy fats include saturated fat, trans fat, and cholesterol  Choose soft margarines that are low in saturated fat and have little or no trans fat  Monounsaturated fats are healthy fats  These are found in olive oil, canola oil, avocado, and nuts  Polyunsaturated fats are also healthy  These are found in fish, flaxseed, walnuts, and soybeans  ¨ Eat fruits and vegetables every day  They are low in calories and fat and a good source of essential vitamins  Include dark green, red, and orange vegetables  Examples include spinach, kale, broccoli, and carrots  ¨ Eat foods high in fiber  Choose whole grain, high-fiber foods  Good choices include whole-wheat breads or cereals, beans, peas, fruits, and vegetables  · Ask your healthcare provider if it is safe for you to drink alcohol  Alcohol can increase your cholesterol and triglyceride levels  A drink of alcohol is 12 ounces of beer, 5 ounces of wine, or 1½ ounces of liquor    Call 911 for any of the following:   · You have any of the following signs of a heart attack:      ¨ Squeezing, pressure, or pain in your chest that lasts longer than 5 minutes or returns    ¨ Discomfort or pain in your back, neck, jaw, stomach, or arm     ¨ Trouble breathing    ¨ Nausea or vomiting    ¨ Lightheadedness or a sudden cold sweat, especially with chest pain or trouble breathing    · You have any of the following signs of a stroke:      ¨ Numbness or drooping on one side of your face     ¨ Weakness in an arm or leg    ¨ Confusion or difficulty speaking    ¨ Dizziness, a severe headache, or vision loss  When should I contact my healthcare provider? · You have questions or concerns about your condition or care  CARE AGREEMENT:   You have the right to help plan your care  Learn about your health condition and how it may be treated  Discuss treatment options with your caregivers to decide what care you want to receive  You always have the right to refuse treatment  The above information is an  only  It is not intended as medical advice for individual conditions or treatments  Talk to your doctor, nurse or pharmacist before following any medical regimen to see if it is safe and effective for you  © 2017 [facility] [facility] Information is for End User's use only and may not be sold, redistributed or otherwise used for commercial purposes  All illustrations and images included in CareNotes® are the copyrighted property of [employer]  or Jake Killian

## 2019-07-03 ENCOUNTER — APPOINTMENT (OUTPATIENT)
Dept: LAB | Facility: CLINIC | Age: 49
End: 2019-07-03
Payer: COMMERCIAL

## 2019-07-03 ENCOUNTER — TRANSCRIBE ORDERS (OUTPATIENT)
Dept: LAB | Facility: CLINIC | Age: 49
End: 2019-07-03

## 2019-07-03 DIAGNOSIS — E03.8 HYPOTHYROIDISM DUE TO HASHIMOTO'S THYROIDITIS: ICD-10-CM

## 2019-07-03 DIAGNOSIS — E06.3 HYPOTHYROIDISM DUE TO HASHIMOTO'S THYROIDITIS: ICD-10-CM

## 2019-07-03 DIAGNOSIS — Z12.5 SCREENING FOR PROSTATE CANCER: ICD-10-CM

## 2019-07-03 DIAGNOSIS — E78.2 MIXED HYPERLIPIDEMIA: ICD-10-CM

## 2019-07-03 LAB
ALBUMIN SERPL BCP-MCNC: 3.7 G/DL (ref 3.5–5)
ALP SERPL-CCNC: 95 U/L (ref 46–116)
ALT SERPL W P-5'-P-CCNC: 33 U/L (ref 12–78)
ANION GAP SERPL CALCULATED.3IONS-SCNC: 8 MMOL/L (ref 4–13)
AST SERPL W P-5'-P-CCNC: 26 U/L (ref 5–45)
BILIRUB SERPL-MCNC: 0.77 MG/DL (ref 0.2–1)
BUN SERPL-MCNC: 15 MG/DL (ref 5–25)
CALCIUM SERPL-MCNC: 8.8 MG/DL (ref 8.3–10.1)
CHLORIDE SERPL-SCNC: 108 MMOL/L (ref 100–108)
CO2 SERPL-SCNC: 26 MMOL/L (ref 21–32)
CREAT SERPL-MCNC: 1.42 MG/DL (ref 0.6–1.3)
GFR SERPL CREATININE-BSD FRML MDRD: 58 ML/MIN/1.73SQ M
GLUCOSE P FAST SERPL-MCNC: 102 MG/DL (ref 65–99)
POTASSIUM SERPL-SCNC: 4.3 MMOL/L (ref 3.5–5.3)
PROT SERPL-MCNC: 7.1 G/DL (ref 6.4–8.2)
PSA SERPL-MCNC: 0.5 NG/ML (ref 0–4)
SODIUM SERPL-SCNC: 142 MMOL/L (ref 136–145)
TSH SERPL DL<=0.05 MIU/L-ACNC: 2.92 UIU/ML (ref 0.36–3.74)

## 2019-07-03 PROCEDURE — G0103 PSA SCREENING: HCPCS

## 2019-07-03 PROCEDURE — 80053 COMPREHEN METABOLIC PANEL: CPT

## 2019-07-03 PROCEDURE — 36415 COLL VENOUS BLD VENIPUNCTURE: CPT

## 2019-07-03 PROCEDURE — 84443 ASSAY THYROID STIM HORMONE: CPT

## 2019-07-23 DIAGNOSIS — E06.3 HYPOTHYROIDISM DUE TO HASHIMOTO'S THYROIDITIS: ICD-10-CM

## 2019-07-23 DIAGNOSIS — E03.8 HYPOTHYROIDISM DUE TO HASHIMOTO'S THYROIDITIS: ICD-10-CM

## 2019-07-23 RX ORDER — LEVOTHYROXINE SODIUM 0.2 MG/1
TABLET ORAL
Qty: 30 TABLET | Refills: 0 | Status: SHIPPED | OUTPATIENT
Start: 2019-07-23 | End: 2019-09-10 | Stop reason: SDUPTHER

## 2019-09-10 DIAGNOSIS — E03.8 HYPOTHYROIDISM DUE TO HASHIMOTO'S THYROIDITIS: ICD-10-CM

## 2019-09-10 DIAGNOSIS — E06.3 HYPOTHYROIDISM DUE TO HASHIMOTO'S THYROIDITIS: ICD-10-CM

## 2019-09-10 RX ORDER — LEVOTHYROXINE SODIUM 0.2 MG/1
TABLET ORAL
Qty: 30 TABLET | Refills: 0 | Status: SHIPPED | OUTPATIENT
Start: 2019-09-10 | End: 2019-11-08 | Stop reason: SDUPTHER

## 2019-11-08 DIAGNOSIS — E03.8 HYPOTHYROIDISM DUE TO HASHIMOTO'S THYROIDITIS: ICD-10-CM

## 2019-11-08 DIAGNOSIS — E06.3 HYPOTHYROIDISM DUE TO HASHIMOTO'S THYROIDITIS: ICD-10-CM

## 2019-11-08 RX ORDER — LEVOTHYROXINE SODIUM 0.2 MG/1
TABLET ORAL
Qty: 30 TABLET | Refills: 0 | Status: SHIPPED | OUTPATIENT
Start: 2019-11-08 | End: 2019-12-30 | Stop reason: SDUPTHER

## 2019-12-30 DIAGNOSIS — E03.8 HYPOTHYROIDISM DUE TO HASHIMOTO'S THYROIDITIS: ICD-10-CM

## 2019-12-30 DIAGNOSIS — E06.3 HYPOTHYROIDISM DUE TO HASHIMOTO'S THYROIDITIS: ICD-10-CM

## 2019-12-30 RX ORDER — LEVOTHYROXINE SODIUM 0.2 MG/1
200 TABLET ORAL EVERY MORNING
Qty: 30 TABLET | Refills: 5 | Status: SHIPPED | OUTPATIENT
Start: 2019-12-30 | End: 2020-01-30 | Stop reason: SDUPTHER

## 2020-01-30 ENCOUNTER — OFFICE VISIT (OUTPATIENT)
Dept: INTERNAL MEDICINE CLINIC | Facility: CLINIC | Age: 50
End: 2020-01-30
Payer: COMMERCIAL

## 2020-01-30 VITALS
HEIGHT: 70 IN | SYSTOLIC BLOOD PRESSURE: 120 MMHG | HEART RATE: 67 BPM | OXYGEN SATURATION: 98 % | WEIGHT: 231.13 LBS | DIASTOLIC BLOOD PRESSURE: 70 MMHG | TEMPERATURE: 97.9 F | BODY MASS INDEX: 33.09 KG/M2

## 2020-01-30 DIAGNOSIS — E06.3 HYPOTHYROIDISM DUE TO HASHIMOTO'S THYROIDITIS: ICD-10-CM

## 2020-01-30 DIAGNOSIS — E78.2 MIXED HYPERLIPIDEMIA: Primary | ICD-10-CM

## 2020-01-30 DIAGNOSIS — E03.8 HYPOTHYROIDISM DUE TO HASHIMOTO'S THYROIDITIS: ICD-10-CM

## 2020-01-30 DIAGNOSIS — M51.26 HERNIATED LUMBAR INTERVERTEBRAL DISC: ICD-10-CM

## 2020-01-30 DIAGNOSIS — D64.9 ANEMIA, UNSPECIFIED TYPE: ICD-10-CM

## 2020-01-30 DIAGNOSIS — F41.1 GENERALIZED ANXIETY DISORDER: ICD-10-CM

## 2020-01-30 DIAGNOSIS — Z11.4 SCREENING FOR HIV (HUMAN IMMUNODEFICIENCY VIRUS): ICD-10-CM

## 2020-01-30 DIAGNOSIS — J45.20 MILD INTERMITTENT ASTHMA WITHOUT COMPLICATION: ICD-10-CM

## 2020-01-30 PROCEDURE — 1036F TOBACCO NON-USER: CPT | Performed by: INTERNAL MEDICINE

## 2020-01-30 PROCEDURE — 99214 OFFICE O/P EST MOD 30 MIN: CPT | Performed by: INTERNAL MEDICINE

## 2020-01-30 PROCEDURE — 3008F BODY MASS INDEX DOCD: CPT | Performed by: INTERNAL MEDICINE

## 2020-01-30 RX ORDER — MELOXICAM 15 MG/1
15 TABLET ORAL DAILY
Qty: 90 TABLET | Refills: 3 | Status: SHIPPED | OUTPATIENT
Start: 2020-01-30

## 2020-01-30 RX ORDER — LEVOTHYROXINE SODIUM 0.2 MG/1
200 TABLET ORAL EVERY MORNING
Qty: 30 TABLET | Refills: 3 | Status: SHIPPED | OUTPATIENT
Start: 2020-01-30 | End: 2020-08-07 | Stop reason: SDUPTHER

## 2020-01-30 NOTE — PATIENT INSTRUCTIONS
Obesity   AMBULATORY CARE:   Obesity  is when your body mass index (BMI) is greater than 30  Your healthcare provider will use your height and weight to measure your BMI  The risks of obesity include  many health problems, such as injuries or physical disability  You may need tests to check for the following:  · Diabetes     · High blood pressure or high cholesterol     · Heart disease     · Gallbladder or liver disease     · Cancer of the colon, breast, prostate, liver, or kidney     · Sleep apnea     · Arthritis or gout  Seek care immediately if:   · You have a severe headache, confusion, or difficulty speaking  · You have weakness on one side of your body  · You have chest pain, sweating, or shortness of breath  Contact your healthcare provider if:   · You have symptoms of gallbladder or liver disease, such as pain in your upper abdomen  · You have knee or hip pain and discomfort while walking  · You have symptoms of diabetes, such as intense hunger and thirst, and frequent urination  · You have symptoms of sleep apnea, such as snoring or daytime sleepiness  · You have questions or concerns about your condition or care  Treatment for obesity  focuses on helping you lose weight to improve your health  Even a small decrease in BMI can reduce the risk for many health problems  Your healthcare provider will help you set a weight-loss goal   · Lifestyle changes  are the first step in treating obesity  These include making healthy food choices and getting regular physical activity  Your healthcare provider may suggest a weight-loss program that involves coaching, education, and therapy  · Medicine  may help you lose weight when it is used with a healthy diet and physical activity  · Surgery  can help you lose weight if you are very obese and have other health problems  There are several types of weight-loss surgery  Ask your healthcare provider for more information    Be successful losing weight:   · Set small, realistic goals  An example of a small goal is to walk for 20 minutes 5 days a week  Anther goal is to lose 5% of your body weight  · Tell friends, family members, and coworkers about your goals  and ask for their support  Ask a friend to lose weight with you, or join a weight-loss support group  · Identify foods or triggers that may cause you to overeat , and find ways to avoid them  Remove tempting high-calorie foods from your home and workplace  Place a bowl of fresh fruit on your kitchen counter  If stress causes you to eat, then find other ways to cope with stress  · Keep a diary to track what you eat and drink  Also write down how many minutes of physical activity you do each day  Weigh yourself once a week and record it in your diary  Eating changes: You will need to eat 500 to 1,000 fewer calories each day than you currently eat to lose 1 to 2 pounds a week  The following changes will help you cut calories:  · Eat smaller portions  Use small plates, no larger than 9 inches in diameter  Fill your plate half full of fruits and vegetables  Measure your food using measuring cups until you know what a serving size looks like  · Eat 3 meals and 1 or 2 snacks each day  Plan your meals in advance  Sisi Siddiqui and eat at home most of the time  Eat slowly  · Eat fruits and vegetables at every meal   They are low in calories and high in fiber, which makes you feel full  Do not add butter, margarine, or cream sauce to vegetables  Use herbs to season steamed vegetables  · Eat less fat and fewer fried foods  Eat more baked or grilled chicken and fish  These protein sources are lower in calories and fat than red meat  Limit fast food  Dress your salads with olive oil and vinegar instead of bottled dressing  · Limit the amount of sugar you eat  Do not drink sugary beverages  Limit alcohol  Activity changes:  Physical activity is good for your body in many ways   It helps you burn calories and build strong muscles  It decreases stress and depression, and improves your mood  It can also help you sleep better  Talk to your healthcare provider before you begin an exercise program   · Exercise for at least 30 minutes 5 days a week  Start slowly  Set aside time each day for physical activity that you enjoy and that is convenient for you  It is best to do both weight training and an activity that increases your heart rate, such as walking, bicycling, or swimming  · Find ways to be more active  Do yard work and housecleaning  Walk up the stairs instead of using elevators  Spend your leisure time going to events that require walking, such as outdoor festivals or fairs  This extra physical activity can help you lose weight and keep it off  Follow up with your healthcare provider as directed: You may need to meet with a dietitian  Write down your questions so you remember to ask them during your visits  © 2017 [address] Information is for End User's use only and may not be sold, redistributed or otherwise used for commercial purposes  All illustrations and images included in CareNotes® are the copyrighted property of [employer]  or Jake Killian  The above information is an  only  It is not intended as medical advice for individual conditions or treatments  Talk to your doctor, nurse or pharmacist before following any medical regimen to see if it is safe and effective for you  Low Fat Diet   AMBULATORY CARE:   A low-fat diet  is an eating plan that is low in total fat, unhealthy fat, and cholesterol  You may need to follow a low-fat diet if you have trouble digesting or absorbing fat  You may also need to follow this diet if you have high cholesterol  You can also lower your cholesterol by increasing the amount of fiber in your diet  Soluble fiber is a type of fiber that helps to decrease cholesterol levels     Different types of fat in food:   · Limit unhealthy fats  A diet that is high in cholesterol, saturated fat, and trans fat may cause unhealthy cholesterol levels  Unhealthy cholesterol levels increase your risk of heart disease  ¨ Cholesterol:  Limit intake of cholesterol to less than 200 mg per day  Cholesterol is found in meat, eggs, and dairy  ¨ Saturated fat:  Limit saturated fat to less than 7% of your total daily calories  Ask your dietitian how many calories you need each day  Saturated fat is found in butter, cheese, ice cream, whole milk, and palm oil  Saturated fat is also found in meat, such as beef, pork, chicken skin, and processed meats  Processed meats include sausage, hot dogs, and bologna  ¨ Trans fat:  Avoid trans fat as much as possible  Trans fat is used in fried and baked foods  Foods that say trans fat free on the label may still have up to 0 5 grams of trans fat per serving  · Include healthy fats  Replace foods that are high in saturated and trans fat with foods high in healthy fats  This may help to decrease high cholesterol levels  ¨ Monounsaturated fats: These are found in avocados, nuts, and vegetable oils, such as olive, canola, and sunflower oil  ¨ Polyunsaturated fats: These can be found in vegetable oils, such as soybean or corn oil  Omega-3 fats can help to decrease the risk of heart disease  Omega-3 fats are found in fish, such as salmon, herring, trout, and tuna  Omega-3 fats can also be found in plant foods, such as walnuts, flaxseed, soybeans, and canola oil    Foods to limit or avoid:   · Grains:      ¨ Snacks that are made with partially hydrogenated oils, such as chips, regular crackers, and butter-flavored popcorn    ¨ High-fat baked goods, such as biscuits, croissants, doughnuts, pies, cookies, and pastries    · Dairy:      ¨ Whole milk, 2% milk, and yogurt and ice cream made with whole milk    ¨ Half and half creamer, heavy cream, and whipping cream    ¨ Cheese, cream cheese, and sour cream    · Meats and proteins:      ¨ High-fat cuts of meat (T-bone steak, regular hamburger, and ribs)    ¨ Fried meat, poultry (turkey and chicken), and fish    ¨ Poultry (chicken and turkey) with skin    ¨ Cold cuts (salami or bologna), hot dogs, velasco, and sausage    ¨ Whole eggs and egg yolks    · Vegetables and fruits with added fat:      ¨ Fried vegetables or vegetables in butter or high-fat sauces, such as cream or cheese sauces    ¨ Fried fruit or fruit served with butter or cream    · Fats:      ¨ Butter, stick margarine, and shortening    ¨ Coconut, palm oil, and palm kernel oil  Foods to include:   · Grains:      ¨ Whole-grain breads, cereals, pasta, and brown rice    ¨ Low-fat crackers and pretzels    · Vegetables and fruits:      ¨ Fresh, frozen, or canned vegetables (no salt or low-sodium)    ¨ Fresh, frozen, dried, or canned fruit (canned in light syrup or fruit juice)    ¨ Avocado    · Low-fat dairy products:      ¨ Nonfat (skim) or 1% milk    ¨ Nonfat or low-fat cheese, yogurt, and cottage cheese    · Meats and proteins:      ¨ Chicken or turkey with no skin    ¨ Baked or broiled fish    ¨ Lean beef and pork (loin, round, extra lean hamburger)    ¨ Beans and peas, unsalted nuts, soy products    ¨ Egg whites and substitutes    ¨ Seeds and nuts    · Fats:      ¨ Unsaturated oil, such as canola, olive, peanut, soybean, or sunflower oil    ¨ Soft or liquid margarine and vegetable oil spread    ¨ Low-fat salad dressing  Other ways to decrease fat:   · Read food labels before you buy foods  Choose foods that have less than 30% of calories from fat  Choose low-fat or fat-free dairy products  Remember that fat free does not mean calorie free  These foods still contain calories, and too many calories can lead to weight gain  · Trim fat from meat and avoid fried food  Trim all visible fat from meat before you cook it  Remove the skin from poultry  Do not [place] meat, fish, or poultry  Bake, roast, boil, or broil these foods instead  Avoid fried foods  Eat a baked potato instead of Western Sindhu fries  Steam vegetables instead of sautéing them in butter  · Add less fat to foods  Use imitation velasco bits on salads and baked potatoes instead of regular velasco bits  Use fat-free or low-fat salad dressings instead of regular dressings  Use low-fat or nonfat butter-flavored topping instead of regular butter or margarine on popcorn and other foods  Ways to decrease fat in recipes:  Replace high-fat ingredients with low-fat or nonfat ones  This may cause baked goods to be drier than usual  You may need to use nonfat cooking spray on pans to prevent food from sticking  You also may need to change the amount of other ingredients, such as water, in the recipe  Try the following:  · Use low-fat or light margarine instead of regular margarine or shortening  · Use lean ground turkey breast or chicken, or lean ground beef (less than 5% fat) instead of hamburger  · Add 1 teaspoon of canola oil to 8 ounces of skim milk instead of using cream or half and half  · Use grated zucchini, carrots, or apples in breads instead of coconut  · Use blenderized, low-fat cottage cheese, plain tofu, or low-fat ricotta cheese instead of cream cheese  · Use 1 egg white and 1 teaspoon of canola oil, or use ¼ cup (2 ounces) of fat-free egg substitute instead of a whole egg  · Replace half of the oil that is called for in a recipe with applesauce when you bake  Use 3 tablespoons of cocoa powder and 1 tablespoon of canola oil instead of a square of baking chocolate  How to increase fiber:  Eat enough high-fiber foods to get 20 to 30 grams of fiber every day  Slowly increase your fiber intake to avoid stomach cramps, gas, and other problems  · Eat 3 ounces of whole-grain foods each day  An ounce is about 1 slice of bread  Eat whole-grain breads, such as whole-wheat bread   Whole wheat, whole-wheat flour, or other whole grains should be listed as the first ingredient on the food label  Replace white flour with whole-grain flour or use half of each in recipes  Whole-grain flour is heavier than white flour, so you may have to add more yeast or baking powder  · Eat a high-fiber cereal for breakfast   Oatmeal is a good source of soluble fiber  Look for cereals that have bran or fiber in the name  Choose whole-grain products, such as brown rice, barley, and whole-wheat pasta  · Eat more beans, peas, and lentils  For example, add beans to soups or salads  Eat at least 5 cups of fruits and vegetables each day  Eat fruits and vegetables with the peel because the peel is high in fiber  © 2017 [address] [employer] Information is for End User's use only and may not be sold, redistributed or otherwise used for commercial purposes  All illustrations and images included in CareNotes® are the copyrighted property of A [employer] M [employer]  or Jake Killian  The above information is an  only  It is not intended as medical advice for individual conditions or treatments  Talk to your doctor, nurse or pharmacist before following any medical regimen to see if it is safe and effective for you  Heart Healthy Diet   AMBULATORY CARE:   A heart healthy diet  is an eating plan low in total fat, unhealthy fats, and sodium (salt)  A heart healthy diet helps decrease your risk for heart disease and stroke  Limit the amount of fat you eat to 25% to 35% of your total daily calories  Limit sodium to less than 2,300 mg each day  Healthy fats:  Healthy fats can help improve cholesterol levels  The risk for heart disease is decreased when cholesterol levels are normal  Choose healthy fats, such as the following:  · Unsaturated fat  is found in foods such as soybean, canola, olive, corn, and safflower oils  It is also found in soft tub margarine that is made with liquid vegetable oil       · Omega-3 fat  is found in certain fish, such as salmon, tuna, and trout, and in walnuts and flaxseed  Unhealthy fats:  Unhealthy fats can cause unhealthy cholesterol levels in your blood and increase your risk of heart disease  Limit unhealthy fats, such as the following:  · Cholesterol  is found in animal foods, such as eggs and lobster, and in dairy products made from whole milk  Limit cholesterol to less than 300 milligrams (mg) each day  You may need to limit cholesterol to 200 mg each day if you have heart disease  · Saturated fat  is found in meats, such as velasco and hamburger  It is also found in chicken or turkey skin, whole milk, and butter  Limit saturated fat to less than 7% of your total daily calories  Limit saturated fat to less than 6% if you have heart disease or are at increased risk for it  · Trans fat  is found in packaged foods, such as potato chips and cookies  It is also in hard margarine, some fried foods, and shortening  Avoid trans fats as much as possible    Heart healthy foods and drinks to include:  Ask your dietitian or healthcare provider how many servings to have from each of the following food groups:  · Grains:      ¨ Whole-wheat breads, cereals, and pastas, and brown rice    ¨ Low-fat, low-sodium crackers and chips    · Vegetables:      ¨ Broccoli, green beans, green peas, and spinach    ¨ Collards, kale, and lima beans    ¨ Carrots, sweet potatoes, tomatoes, and peppers    ¨ Canned vegetables with no salt added    · Fruits:      ¨ Bananas, peaches, pears, and pineapple    ¨ Grapes, raisins, and dates    ¨ Oranges, tangerines, grapefruit, orange juice, and grapefruit juice    ¨ Apricots, mangoes, melons, and papaya    ¨ Raspberries and strawberries    ¨ Canned fruit with no added sugar    · Low-fat dairy products:      ¨ Nonfat (skim) milk, 1% milk, and low-fat almond, cashew, or soy milks fortified with calcium    ¨ Low-fat cheese, regular or frozen yogurt, and cottage cheese    · Meats and proteins , such as lean cuts of beef and pork (loin, leg, round), skinless chicken and turkey, legumes, soy products, egg whites, and nuts  Foods and drinks to limit or avoid:  Ask your dietitian or healthcare provider about these and other foods that are high in unhealthy fat, sodium, and sugar:  · Snack or packaged foods , such as frozen dinners, cookies, macaroni and cheese, and cereals with more than 300 mg of sodium per serving    · Canned or dry mixes  for cakes, soups, sauces, or gravies    · Vegetables with added sodium , such as instant potatoes, vegetables with added sauces, or regular canned vegetables    · Other foods high in sodium , such as ketchup, barbecue sauce, salad dressing, pickles, olives, soy sauce, and miso    · High-fat dairy foods  such as whole or 2% milk, cream cheese, or sour cream, and cheeses     · High-fat protein foods  such as high-fat cuts of beef (T-bone steaks, ribs), chicken or turkey with skin, and organ meats, such as liver    · Cured or smoked meats , such as hot dogs, velasco, and sausage    · Unhealthy fats and oils , such as butter, stick margarine, shortening, and cooking oils such as coconut or palm oil    · Food and drinks high in sugar , such as soft drinks (soda), sports drinks, sweetened tea, candy, cake, cookies, pies, and doughnuts  Other diet guidelines to follow:   · Eat more foods containing omega-3 fats  Eat fish high in omega-3 fats at least 2 times a week  · Limit alcohol  Too much alcohol can damage your heart and raise your blood pressure  Women should limit alcohol to 1 drink a day  Men should limit alcohol to 2 drinks a day  A drink of alcohol is 12 ounces of beer, 5 ounces of wine, or 1½ ounces of liquor  · Choose low-sodium foods  High-sodium foods can lead to high blood pressure  Add little or no salt to food you prepare  Use herbs and spices in place of salt  · Eat more fiber  to help lower cholesterol levels   Eat at least 5 servings of fruits and vegetables each day  Eat 3 ounces of whole-grain foods each day  Legumes (beans) are also a good source of fiber  Lifestyle guidelines:   · Do not smoke  Nicotine and other chemicals in cigarettes and cigars can cause lung and heart damage  Ask your healthcare provider for information if you currently smoke and need help to quit  E-cigarettes or smokeless tobacco still contain nicotine  Talk to your healthcare provider before you use these products  · Exercise regularly  to help you maintain a healthy weight and improve your blood pressure and cholesterol levels  Ask your healthcare provider about the best exercise plan for you  Do not start an exercise program without asking your healthcare provider  Follow up with your healthcare provider as directed:  Write down your questions so you remember to ask them during your visits  © 2017 [address] [facility] Information is for End User's use only and may not be sold, redistributed or otherwise used for commercial purposes  All illustrations and images included in CareNotes® are the copyrighted property of A [employer] M [employer]  or Jake Constantin  The above information is an  only  It is not intended as medical advice for individual conditions or treatments  Talk to your doctor, nurse or pharmacist before following any medical regimen to see if it is safe and effective for you  Hyperlipidemia   WHAT YOU NEED TO KNOW:   What is hyperlipidemia? Hyperlipidemia is a high level of lipids (fats) in your blood  These lipids include cholesterol or triglycerides  Lipids are made by your body  They also come from the foods you eat  Your body needs lipids to work properly, but high levels increase your risk for heart disease, heart attack, and stroke  What increases my risk for hyperlipidemia?    · Family history of high lipid levels    · Diet high in saturated fats, cholesterol, or calories     · High alcohol intake or smoking    · Lack of regular physical activity    · Medical conditions such as hypothyroidism, obesity, or type 2 diabetes    · Certain medicines, such as blood pressure medicines, hormones, and steroids  How is hyperlipidemia managed and treated? Your healthcare provider may first recommend that you make lifestyle changes to help decrease your lipid levels  You may also need to take medicine to lower your lipid levels  Some of the lifestyle changes you may need to make include the following:  · Maintain a healthy weight  Ask your healthcare provider how much you should weigh  Ask him or her to help you create a weight loss plan if you are overweight  Weight loss can decrease your cholesterol and triglyceride levels  · Exercise as directed  Exercise lowers your cholesterol levels and helps you maintain a healthy weight  Get 40 minutes or more of moderate exercise 3 to 4 days each week  You can split your exercise into four 10-minute workouts instead of 40 minutes at one time  Examples of moderate exercises include walking briskly, swimming, or riding a bike  Work with your healthcare provider to plan the best exercise program for you  · Do not smoke  Nicotine and other chemicals in cigarettes and cigars can increase your risk for a heart attack and stroke  Ask your healthcare provider for information if you currently smoke and need help to quit  E-cigarettes or smokeless tobacco still contain nicotine  Talk to your healthcare provider before you use these products  · Eat heart-healthy foods  Talk to your dietitian about a heart-healthy diet  The following will help you manage hyperlipidemia:     ¨ Decrease the total amount of fat you eat  Choose lean meats, fat-free or 1% fat milk, and low-fat dairy products, such as yogurt and cheese  Limit or do not eat red meat  Red meats are high in fat and cholesterol  ¨ Replace unhealthy fats with healthy fats  Unhealthy fats include saturated fat, trans fat, and cholesterol  Choose soft margarines that are low in saturated fat and have little or no trans fat  Monounsaturated fats are healthy fats  These are found in olive oil, canola oil, avocado, and nuts  Polyunsaturated fats are also healthy  These are found in fish, flaxseed, walnuts, and soybeans  ¨ Eat fruits and vegetables every day  They are low in calories and fat and a good source of essential vitamins  Include dark green, red, and orange vegetables  Examples include spinach, kale, broccoli, and carrots  ¨ Eat foods high in fiber  Choose whole grain, high-fiber foods  Good choices include whole-wheat breads or cereals, beans, peas, fruits, and vegetables  · Ask your healthcare provider if it is safe for you to drink alcohol  Alcohol can increase your cholesterol and triglyceride levels  A drink of alcohol is 12 ounces of beer, 5 ounces of wine, or 1½ ounces of liquor  Call 911 for any of the following:   · You have any of the following signs of a heart attack:      ¨ Squeezing, pressure, or pain in your chest that lasts longer than 5 minutes or returns    ¨ Discomfort or pain in your back, neck, jaw, stomach, or arm     ¨ Trouble breathing    ¨ Nausea or vomiting    ¨ Lightheadedness or a sudden cold sweat, especially with chest pain or trouble breathing    · You have any of the following signs of a stroke:      ¨ Numbness or drooping on one side of your face     ¨ Weakness in an arm or leg    ¨ Confusion or difficulty speaking    ¨ Dizziness, a severe headache, or vision loss  When should I contact my healthcare provider? · You have questions or concerns about your condition or care  CARE AGREEMENT:   You have the right to help plan your care  Learn about your health condition and how it may be treated  Discuss treatment options with your caregivers to decide what care you want to receive  You always have the right to refuse treatment  The above information is an  only   It is not intended as medical advice for individual conditions or treatments  Talk to your doctor, nurse or pharmacist before following any medical regimen to see if it is safe and effective for you  © 2017 [address] Information is for End User's use only and may not be sold, redistributed or otherwise used for commercial purposes  All illustrations and images included in CareNotes® are the copyrighted property of A [employer] M [employer]  or Jaek Killian

## 2020-01-30 NOTE — PROGRESS NOTES
BMI Counseling: Body mass index is 33 16 kg/m²  The BMI is above normal  Nutrition recommendations include decreasing portion sizes, increasing intake of lean protein and reducing intake of saturated and trans fat  Exercise recommendations include moderate physical activity 150 minutes/week  No pharmacotherapy was ordered  Assessment/Plan:  Problem List Items Addressed This Visit        Endocrine    Hypothyroidism    Relevant Medications    levothyroxine 200 mcg tablet    Other Relevant Orders    TSH, 3rd generation       Respiratory    Asthma       Musculoskeletal and Integument    Herniated lumbar intervertebral disc    Relevant Medications    meloxicam (MOBIC) 15 mg tablet       Other    Anemia    Generalized anxiety disorder    Hyperlipidemia - Primary    Relevant Orders    Comprehensive metabolic panel    Lipid Panel with Direct LDL reflex      Other Visit Diagnoses     Screening for HIV (human immunodeficiency virus)        Relevant Orders    [facility] HIV 1/2 AG-AB combo           Diagnoses and all orders for this visit:    Mixed hyperlipidemia  -     Comprehensive metabolic panel; Future  -     Lipid Panel with Direct LDL reflex; Future    Hypothyroidism due to Hashimoto's thyroiditis  -     TSH, 3rd generation; Future  -     levothyroxine 200 mcg tablet; Take 1 tablet (200 mcg total) by mouth every morning    Generalized anxiety disorder    Anemia, unspecified type    Mild intermittent asthma without complication    Screening for HIV (human immunodeficiency virus)  -     [facility] HIV 1/2 AG-AB combo; Future    Herniated lumbar intervertebral disc  -     meloxicam (MOBIC) 15 mg tablet; Take 1 tablet (15 mg total) by mouth daily        No problem-specific Assessment & Plan notes found for this encounter  A/P: Doing well and will check labs  Already had his flu shot  Continue current treatment and RTC six months for routine       Subjective:      Patient ID: Cindy Lindsay is a 51 y o  male   RTC for f/u hyperlipidemia, AZALEA, etc  Doing well and no new issues  Breathing is good and no usage of a VALERIA  Intermittent back pain and wants a refill on his meds  Due for labs  Already had his flu shot  The following portions of the patient's history were reviewed and updated as appropriate:   He has a past medical history of Anemia, Asthma, Chronic kidney disease, Disease of thyroid gland, Generalized anxiety disorder, Kidney stone, and Lyme disease  ,  does not have any pertinent problems on file  ,   has a past surgical history that includes Knee arthroscopy  ,  family history includes No Known Problems in his father and mother  ,   reports that he has never smoked  His smokeless tobacco use includes chew  He reports that he drinks alcohol  He reports that he does not use drugs  ,  has No Known Allergies     Current Outpatient Medications   Medication Sig Dispense Refill    ALPRAZolam (XANAX) 0 5 mg tablet Take 1 tablet by mouth every 8 (eight) hours as needed      fluticasone (FLONASE) 50 mcg/act nasal spray 1 spray into each nostril daily 16 g 0    levothyroxine 200 mcg tablet Take 1 tablet (200 mcg total) by mouth every morning 30 tablet 3    meloxicam (MOBIC) 15 mg tablet Take 1 tablet (15 mg total) by mouth daily 90 tablet 3     No current facility-administered medications for this visit  Review of Systems   Constitutional: Negative for activity change, chills, diaphoresis, fatigue and fever  HENT: Negative  Eyes: Negative for visual disturbance  Respiratory: Negative for cough, chest tightness, shortness of breath and wheezing  Cardiovascular: Negative for chest pain, palpitations and leg swelling  Gastrointestinal: Negative for abdominal pain, constipation, diarrhea, nausea and vomiting  Endocrine: Negative for cold intolerance and heat intolerance  Genitourinary: Negative for difficulty urinating, dysuria and frequency     Musculoskeletal: Negative for arthralgias, gait problem and myalgias  Neurological: Negative for dizziness, seizures, syncope, weakness, light-headedness and headaches  Psychiatric/Behavioral: Negative for confusion, dysphoric mood and sleep disturbance  The patient is not nervous/anxious  PHQ-9 Depression Screening    PHQ-9:    Frequency of the following problems over the past two weeks:       Little interest or pleasure in doing things:  0 - not at all  Feeling down, depressed, or hopeless:  0 - not at all  PHQ-2 Score:  0        Objective:  Vitals:    01/30/20 1541   BP: 120/70   Pulse: 67   Temp: 97 9 °F (36 6 °C)   SpO2: 98%   Weight: 105 kg (231 lb 2 oz)   Height: 5' 10" (1 778 m)     Body mass index is 33 16 kg/m²  Physical Exam   Constitutional: He is oriented to person, place, and time  He appears well-developed and well-nourished  No distress  HENT:   Head: Normocephalic and atraumatic  Mouth/Throat: Oropharynx is clear and moist    Eyes: Pupils are equal, round, and reactive to light  Conjunctivae and EOM are normal    Neck: Neck supple  No JVD present  Cardiovascular: Normal rate, regular rhythm and normal heart sounds  Pulmonary/Chest: Effort normal and breath sounds normal  No respiratory distress  He has no wheezes  He has no rales  Abdominal: Soft  Bowel sounds are normal  He exhibits no distension  There is no tenderness  Musculoskeletal: He exhibits no edema  Neurological: He is alert and oriented to person, place, and time  Psychiatric: He has a normal mood and affect  His behavior is normal  Judgment and thought content normal    Nursing note and vitals reviewed  BMI Counseling: Body mass index is 33 16 kg/m²  The BMI is above normal  Nutrition recommendations include reducing portion sizes, decreasing overall calorie intake, reducing intake of saturated fat and trans fat and reducing intake of cholesterol   Exercise recommendations include moderate aerobic physical activity for 150

## 2020-02-06 ENCOUNTER — APPOINTMENT (OUTPATIENT)
Dept: LAB | Facility: CLINIC | Age: 50
End: 2020-02-06
Payer: COMMERCIAL

## 2020-02-06 DIAGNOSIS — Z11.4 SCREENING FOR HIV (HUMAN IMMUNODEFICIENCY VIRUS): ICD-10-CM

## 2020-02-06 DIAGNOSIS — E06.3 HYPOTHYROIDISM DUE TO HASHIMOTO'S THYROIDITIS: ICD-10-CM

## 2020-02-06 DIAGNOSIS — E78.2 MIXED HYPERLIPIDEMIA: ICD-10-CM

## 2020-02-06 DIAGNOSIS — E03.8 HYPOTHYROIDISM DUE TO HASHIMOTO'S THYROIDITIS: ICD-10-CM

## 2020-02-06 LAB
ALBUMIN SERPL BCP-MCNC: 3.9 G/DL (ref 3.5–5)
ALP SERPL-CCNC: 92 U/L (ref 46–116)
ALT SERPL W P-5'-P-CCNC: 24 U/L (ref 12–78)
ANION GAP SERPL CALCULATED.3IONS-SCNC: 6 MMOL/L (ref 4–13)
AST SERPL W P-5'-P-CCNC: 16 U/L (ref 5–45)
BILIRUB SERPL-MCNC: 0.76 MG/DL (ref 0.2–1)
BUN SERPL-MCNC: 23 MG/DL (ref 5–25)
CALCIUM SERPL-MCNC: 9.3 MG/DL (ref 8.3–10.1)
CHLORIDE SERPL-SCNC: 109 MMOL/L (ref 100–108)
CHOLEST SERPL-MCNC: 194 MG/DL (ref 50–200)
CO2 SERPL-SCNC: 27 MMOL/L (ref 21–32)
CREAT SERPL-MCNC: 1.3 MG/DL (ref 0.6–1.3)
GFR SERPL CREATININE-BSD FRML MDRD: 65 ML/MIN/1.73SQ M
GLUCOSE P FAST SERPL-MCNC: 105 MG/DL (ref 65–99)
HDLC SERPL-MCNC: 49 MG/DL
LDLC SERPL CALC-MCNC: 110 MG/DL (ref 0–100)
POTASSIUM SERPL-SCNC: 4.4 MMOL/L (ref 3.5–5.3)
PROT SERPL-MCNC: 7.4 G/DL (ref 6.4–8.2)
SODIUM SERPL-SCNC: 142 MMOL/L (ref 136–145)
TRIGL SERPL-MCNC: 174 MG/DL
TSH SERPL DL<=0.05 MIU/L-ACNC: 0.55 UIU/ML (ref 0.36–3.74)

## 2020-02-06 PROCEDURE — 36415 COLL VENOUS BLD VENIPUNCTURE: CPT

## 2020-02-06 PROCEDURE — 87389 HIV-1 AG W/HIV-1&-2 AB AG IA: CPT

## 2020-02-06 PROCEDURE — 80061 LIPID PANEL: CPT

## 2020-02-06 PROCEDURE — 84443 ASSAY THYROID STIM HORMONE: CPT

## 2020-02-06 PROCEDURE — 80053 COMPREHEN METABOLIC PANEL: CPT

## 2020-02-08 LAB — HIV 1+2 AB+HIV1 P24 AG SERPL QL IA: NORMAL

## 2020-08-07 ENCOUNTER — TRANSCRIBE ORDERS (OUTPATIENT)
Dept: LAB | Facility: CLINIC | Age: 50
End: 2020-08-07

## 2020-08-07 ENCOUNTER — OFFICE VISIT (OUTPATIENT)
Dept: INTERNAL MEDICINE CLINIC | Facility: CLINIC | Age: 50
End: 2020-08-07
Payer: COMMERCIAL

## 2020-08-07 ENCOUNTER — APPOINTMENT (OUTPATIENT)
Dept: LAB | Facility: CLINIC | Age: 50
End: 2020-08-07
Payer: COMMERCIAL

## 2020-08-07 VITALS
OXYGEN SATURATION: 95 % | SYSTOLIC BLOOD PRESSURE: 124 MMHG | HEIGHT: 70 IN | WEIGHT: 234 LBS | TEMPERATURE: 97.5 F | BODY MASS INDEX: 33.5 KG/M2 | DIASTOLIC BLOOD PRESSURE: 76 MMHG | HEART RATE: 76 BPM

## 2020-08-07 DIAGNOSIS — Z13.0 SCREENING FOR DEFICIENCY ANEMIA: ICD-10-CM

## 2020-08-07 DIAGNOSIS — Z12.5 SCREENING FOR PROSTATE CANCER: ICD-10-CM

## 2020-08-07 DIAGNOSIS — J30.9 ALLERGIC RHINITIS, UNSPECIFIED SEASONALITY, UNSPECIFIED TRIGGER: ICD-10-CM

## 2020-08-07 DIAGNOSIS — Z13.29 SCREENING FOR THYROID DISORDER: ICD-10-CM

## 2020-08-07 DIAGNOSIS — Z13.1 SCREENING FOR DIABETES MELLITUS (DM): ICD-10-CM

## 2020-08-07 DIAGNOSIS — M72.2 PLANTAR FASCIITIS, BILATERAL: ICD-10-CM

## 2020-08-07 DIAGNOSIS — E78.2 MIXED HYPERLIPIDEMIA: ICD-10-CM

## 2020-08-07 DIAGNOSIS — F41.1 GENERALIZED ANXIETY DISORDER: Primary | ICD-10-CM

## 2020-08-07 DIAGNOSIS — J45.20 MILD INTERMITTENT ASTHMA WITHOUT COMPLICATION: ICD-10-CM

## 2020-08-07 DIAGNOSIS — D64.9 ANEMIA, UNSPECIFIED TYPE: ICD-10-CM

## 2020-08-07 DIAGNOSIS — Z72.0 CHEWS TOBACCO: ICD-10-CM

## 2020-08-07 DIAGNOSIS — E03.8 HYPOTHYROIDISM DUE TO HASHIMOTO'S THYROIDITIS: ICD-10-CM

## 2020-08-07 DIAGNOSIS — F41.1 GENERALIZED ANXIETY DISORDER: ICD-10-CM

## 2020-08-07 DIAGNOSIS — E06.3 HYPOTHYROIDISM DUE TO HASHIMOTO'S THYROIDITIS: ICD-10-CM

## 2020-08-07 DIAGNOSIS — Z13.220 SCREENING FOR LIPID DISORDERS: ICD-10-CM

## 2020-08-07 DIAGNOSIS — E78.2 MIXED HYPERLIPIDEMIA: Primary | ICD-10-CM

## 2020-08-07 LAB
ALBUMIN SERPL BCP-MCNC: 4.2 G/DL (ref 3.5–5)
ALP SERPL-CCNC: 85 U/L (ref 46–116)
ALT SERPL W P-5'-P-CCNC: 29 U/L (ref 12–78)
ANION GAP SERPL CALCULATED.3IONS-SCNC: 4 MMOL/L (ref 4–13)
AST SERPL W P-5'-P-CCNC: 16 U/L (ref 5–45)
BASOPHILS # BLD AUTO: 0.06 THOUSANDS/ΜL (ref 0–0.1)
BASOPHILS NFR BLD AUTO: 1 % (ref 0–1)
BILIRUB SERPL-MCNC: 0.73 MG/DL (ref 0.2–1)
BUN SERPL-MCNC: 22 MG/DL (ref 5–25)
CALCIUM SERPL-MCNC: 9.4 MG/DL (ref 8.3–10.1)
CHLORIDE SERPL-SCNC: 107 MMOL/L (ref 100–108)
CO2 SERPL-SCNC: 31 MMOL/L (ref 21–32)
CREAT SERPL-MCNC: 1.31 MG/DL (ref 0.6–1.3)
EOSINOPHIL # BLD AUTO: 0.23 THOUSAND/ΜL (ref 0–0.61)
EOSINOPHIL NFR BLD AUTO: 4 % (ref 0–6)
ERYTHROCYTE [DISTWIDTH] IN BLOOD BY AUTOMATED COUNT: 12.4 % (ref 11.6–15.1)
GFR SERPL CREATININE-BSD FRML MDRD: 63 ML/MIN/1.73SQ M
GLUCOSE P FAST SERPL-MCNC: 101 MG/DL (ref 65–99)
HCT VFR BLD AUTO: 46.5 % (ref 36.5–49.3)
HGB BLD-MCNC: 14.9 G/DL (ref 12–17)
IMM GRANULOCYTES # BLD AUTO: 0.03 THOUSAND/UL (ref 0–0.2)
IMM GRANULOCYTES NFR BLD AUTO: 1 % (ref 0–2)
LDLC SERPL DIRECT ASSAY-MCNC: 124 MG/DL (ref 0–100)
LYMPHOCYTES # BLD AUTO: 1.67 THOUSANDS/ΜL (ref 0.6–4.47)
LYMPHOCYTES NFR BLD AUTO: 28 % (ref 14–44)
MCH RBC QN AUTO: 29.3 PG (ref 26.8–34.3)
MCHC RBC AUTO-ENTMCNC: 32 G/DL (ref 31.4–37.4)
MCV RBC AUTO: 91 FL (ref 82–98)
MONOCYTES # BLD AUTO: 0.45 THOUSAND/ΜL (ref 0.17–1.22)
MONOCYTES NFR BLD AUTO: 8 % (ref 4–12)
NEUTROPHILS # BLD AUTO: 3.53 THOUSANDS/ΜL (ref 1.85–7.62)
NEUTS SEG NFR BLD AUTO: 58 % (ref 43–75)
NRBC BLD AUTO-RTO: 0 /100 WBCS
PLATELET # BLD AUTO: 224 THOUSANDS/UL (ref 149–390)
PMV BLD AUTO: 11.8 FL (ref 8.9–12.7)
POTASSIUM SERPL-SCNC: 5 MMOL/L (ref 3.5–5.3)
PROT SERPL-MCNC: 8 G/DL (ref 6.4–8.2)
PSA SERPL-MCNC: 0.5 NG/ML (ref 0–4)
RBC # BLD AUTO: 5.09 MILLION/UL (ref 3.88–5.62)
SODIUM SERPL-SCNC: 142 MMOL/L (ref 136–145)
TRIGL SERPL-MCNC: 115 MG/DL
TSH SERPL DL<=0.05 MIU/L-ACNC: 2.05 UIU/ML (ref 0.36–3.74)
WBC # BLD AUTO: 5.97 THOUSAND/UL (ref 4.31–10.16)

## 2020-08-07 PROCEDURE — 4004F PT TOBACCO SCREEN RCVD TLK: CPT | Performed by: INTERNAL MEDICINE

## 2020-08-07 PROCEDURE — 80307 DRUG TEST PRSMV CHEM ANLYZR: CPT | Performed by: INTERNAL MEDICINE

## 2020-08-07 PROCEDURE — 84478 ASSAY OF TRIGLYCERIDES: CPT

## 2020-08-07 PROCEDURE — 36415 COLL VENOUS BLD VENIPUNCTURE: CPT

## 2020-08-07 PROCEDURE — 99214 OFFICE O/P EST MOD 30 MIN: CPT | Performed by: INTERNAL MEDICINE

## 2020-08-07 PROCEDURE — 3008F BODY MASS INDEX DOCD: CPT | Performed by: INTERNAL MEDICINE

## 2020-08-07 PROCEDURE — 83721 ASSAY OF BLOOD LIPOPROTEIN: CPT

## 2020-08-07 PROCEDURE — 85025 COMPLETE CBC W/AUTO DIFF WBC: CPT

## 2020-08-07 PROCEDURE — G0103 PSA SCREENING: HCPCS

## 2020-08-07 PROCEDURE — 80053 COMPREHEN METABOLIC PANEL: CPT

## 2020-08-07 PROCEDURE — 84443 ASSAY THYROID STIM HORMONE: CPT

## 2020-08-07 RX ORDER — LEVOTHYROXINE SODIUM 0.2 MG/1
200 TABLET ORAL EVERY MORNING
Qty: 30 TABLET | Refills: 3 | Status: SHIPPED | OUTPATIENT
Start: 2020-08-07 | End: 2021-02-05 | Stop reason: SDUPTHER

## 2020-08-07 NOTE — PROGRESS NOTES
Tobacco Cessation Counseling: Tobacco cessation counseling was provided  The patient is sincerely urged to quit consumption of tobacco  He is not ready to quit tobacco  Medication options and side effects of medication not discussed  Patient refused medication  Doesn't smoke  Chews  Assessment/Plan:  Problem List Items Addressed This Visit        Endocrine    Hypothyroidism    Relevant Medications    levothyroxine 200 mcg tablet       Respiratory    Allergic rhinitis    Asthma       Other    Anemia    Relevant Orders    CBC and differential    Generalized anxiety disorder    Relevant Orders    Benzodiazepines Conf (GC/MS)    Toxicology screen, urine    Hyperlipidemia - Primary    Relevant Orders    Comprehensive metabolic panel    LDL cholesterol, direct    Triglycerides    Chews tobacco      Other Visit Diagnoses     Screening for prostate cancer        Relevant Orders    PSA, Total Screen    Screening for lipid disorders        Screening for diabetes mellitus (DM)        Screening for thyroid disorder        Relevant Orders    TSH, 3rd generation    Screening for deficiency anemia        Plantar fasciitis, bilateral        Relevant Orders    Ambulatory referral to Podiatry           Diagnoses and all orders for this visit:    Mixed hyperlipidemia  -     Comprehensive metabolic panel; Future  -     LDL cholesterol, direct; Future  -     Triglycerides; Future    Hypothyroidism due to Hashimoto's thyroiditis  -     levothyroxine 200 mcg tablet; Take 1 tablet (200 mcg total) by mouth every morning    Mild intermittent asthma without complication    Allergic rhinitis, unspecified seasonality, unspecified trigger    Anemia, unspecified type  -     CBC and differential; Future    Generalized anxiety disorder  -     Benzodiazepines Conf (GC/MS)  -     Toxicology screen, urine    Screening for prostate cancer  -     PSA, Total Screen;  Future    Screening for lipid disorders    Screening for diabetes mellitus (DM)    Screening for thyroid disorder  -     TSH, 3rd generation; Future    Screening for deficiency anemia    Chews tobacco    Plantar fasciitis, bilateral  -     Ambulatory referral to Podiatry; Future        No problem-specific Assessment & Plan notes found for this encounter  A/P: Doing well except for the plantar's  Will refer to [facility]  Discussed inserts and exercises over the interne  Wean tobacco  Continue current treatment otherwise  RTC six months for routine  Subjective:      Patient ID: Lenny Cousin is a 53 y o  male  WM RTC for f/u hyperlipidemia, AZALEA, etc  Doing well and no new issues, but reports his bilat plantar fascitis is bad    Remains active w/o difficulty and no falls  Breathing is good and no MDI use  Allergies are manageable  AZALEA is good  Due for labs  The following portions of the patient's history were reviewed and updated as appropriate:   He has a past medical history of Anemia, Asthma, Chronic kidney disease, Disease of thyroid gland, Generalized anxiety disorder, Kidney stone, and Lyme disease  ,  does not have any pertinent problems on file  ,   has a past surgical history that includes Knee arthroscopy  ,  family history includes No Known Problems in his father and mother  ,   reports that he has never smoked  His smokeless tobacco use includes chew  He reports current alcohol use  He reports that he does not use drugs  ,  has No Known Allergies     Current Outpatient Medications   Medication Sig Dispense Refill    ALPRAZolam (XANAX) 0 5 mg tablet Take 1 tablet by mouth every 8 (eight) hours as needed      fluticasone (FLONASE) 50 mcg/act nasal spray 1 spray into each nostril daily 16 g 0    levothyroxine 200 mcg tablet Take 1 tablet (200 mcg total) by mouth every morning 30 tablet 3    meloxicam (MOBIC) 15 mg tablet Take 1 tablet (15 mg total) by mouth daily 90 tablet 3     No current facility-administered medications for this visit          Review of Systems Constitutional: Negative for activity change, chills, diaphoresis, fatigue and fever  HENT: Negative  Eyes: Negative for visual disturbance  Respiratory: Negative for cough, chest tightness, shortness of breath and wheezing  Cardiovascular: Negative for chest pain, palpitations and leg swelling  Gastrointestinal: Negative for abdominal pain, constipation, diarrhea, nausea and vomiting  Endocrine: Negative for cold intolerance and heat intolerance  Genitourinary: Negative for difficulty urinating, dysuria and frequency  Musculoskeletal: Negative for arthralgias, gait problem and myalgias  Neurological: Negative for dizziness, seizures, syncope, weakness, light-headedness and headaches  Psychiatric/Behavioral: Negative for confusion, dysphoric mood and sleep disturbance  The patient is not nervous/anxious  PHQ-9 Depression Screening    PHQ-9:    Frequency of the following problems over the past two weeks:             Objective:  Vitals:    08/07/20 0801   BP: 124/76   Pulse: 76   Temp: 97 5 °F (36 4 °C)   SpO2: 95%   Weight: 106 kg (234 lb)   Height: 5' 10" (1 778 m)     Body mass index is 33 58 kg/m²  Physical Exam   Constitutional: He is oriented to person, place, and time  No distress  HENT:   Head: Normocephalic and atraumatic  Mouth/Throat: Mucous membranes are moist    Eyes: Pupils are equal, round, and reactive to light  Conjunctivae are normal    Neck: Neck supple  No muscular tenderness present  Carotid bruit is not present  Cardiovascular: Normal rate, regular rhythm and normal heart sounds  Pulmonary/Chest: Effort normal and breath sounds normal  No respiratory distress  He has no wheezes  He has no rales  Abdominal: Soft  Normal appearance and bowel sounds are normal  He exhibits no distension  There is no abdominal tenderness  Musculoskeletal: Normal range of motion  General: Tenderness present  No deformity  Right lower leg: No edema        Left lower leg: No edema  Neurological: He is alert and oriented to person, place, and time  Psychiatric: His behavior is normal  Mood, judgment and thought content normal    Nursing note and vitals reviewed  Tobacco Cessation Counseling: Tobacco cessation counseling and education was provided  The patient is sincerely urged to quit consumption of tobacco  He is not ready to quit tobacco  The numerous health risks of tobacco consumption were discussed  pt doesn't smoke, but chews  Defers stopping  Nadir Douglas

## 2020-08-07 NOTE — PATIENT INSTRUCTIONS
Hyperlipidemia   WHAT YOU NEED TO KNOW:   What is hyperlipidemia? Hyperlipidemia is a high level of lipids (fats) in your blood  These lipids include cholesterol or triglycerides  Lipids are made by your body  They also come from the foods you eat  Your body needs lipids to work properly, but high levels increase your risk for heart disease, heart attack, and stroke  What increases my risk for hyperlipidemia? · Family history of high lipid levels    · Diet high in saturated fats, cholesterol, or calories     · High alcohol intake or smoking    · Lack of regular physical activity    · Medical conditions such as hypothyroidism, obesity, or type 2 diabetes    · Certain medicines, such as blood pressure medicines, hormones, and steroids  How is hyperlipidemia managed and treated? Your healthcare provider may first recommend that you make lifestyle changes to help decrease your lipid levels  You may also need to take medicine to lower your lipid levels  Some of the lifestyle changes you may need to make include the following:  · Maintain a healthy weight  Ask your healthcare provider how much you should weigh  Ask him or her to help you create a weight loss plan if you are overweight  Weight loss can decrease your cholesterol and triglyceride levels  · Exercise as directed  Exercise lowers your cholesterol levels and helps you maintain a healthy weight  Get 40 minutes or more of moderate exercise 3 to 4 days each week  You can split your exercise into four 10-minute workouts instead of 40 minutes at one time  Examples of moderate exercises include walking briskly, swimming, or riding a bike  Work with your healthcare provider to plan the best exercise program for you  · Do not smoke  Nicotine and other chemicals in cigarettes and cigars can increase your risk for a heart attack and stroke  Ask your healthcare provider for information if you currently smoke and need help to quit   E-cigarettes or smokeless tobacco still contain nicotine  Talk to your healthcare provider before you use these products  · Eat heart-healthy foods  Talk to your dietitian about a heart-healthy diet  The following will help you manage hyperlipidemia:     ¨ Decrease the total amount of fat you eat  Choose lean meats, fat-free or 1% fat milk, and low-fat dairy products, such as yogurt and cheese  Limit or do not eat red meat  Red meats are high in fat and cholesterol  ¨ Replace unhealthy fats with healthy fats  Unhealthy fats include saturated fat, trans fat, and cholesterol  Choose soft margarines that are low in saturated fat and have little or no trans fat  Monounsaturated fats are healthy fats  These are found in olive oil, canola oil, avocado, and nuts  Polyunsaturated fats are also healthy  These are found in fish, flaxseed, walnuts, and soybeans  ¨ Eat fruits and vegetables every day  They are low in calories and fat and a good source of essential vitamins  Include dark green, red, and orange vegetables  Examples include spinach, kale, broccoli, and carrots  ¨ Eat foods high in fiber  Choose whole grain, high-fiber foods  Good choices include whole-wheat breads or cereals, beans, peas, fruits, and vegetables  · Ask your healthcare provider if it is safe for you to drink alcohol  Alcohol can increase your cholesterol and triglyceride levels  A drink of alcohol is 12 ounces of beer, 5 ounces of wine, or 1½ ounces of liquor    Call 911 for any of the following:   · You have any of the following signs of a heart attack:      ¨ Squeezing, pressure, or pain in your chest that lasts longer than 5 minutes or returns    ¨ Discomfort or pain in your back, neck, jaw, stomach, or arm     ¨ Trouble breathing    ¨ Nausea or vomiting    ¨ Lightheadedness or a sudden cold sweat, especially with chest pain or trouble breathing    · You have any of the following signs of a stroke:      ¨ Numbness or drooping on one side of your face     ¨ Weakness in an arm or leg    ¨ Confusion or difficulty speaking    ¨ Dizziness, a severe headache, or vision loss  When should I contact my healthcare provider? · You have questions or concerns about your condition or care  CARE AGREEMENT:   You have the right to help plan your care  Learn about your health condition and how it may be treated  Discuss treatment options with your caregivers to decide what care you want to receive  You always have the right to refuse treatment  The above information is an  only  It is not intended as medical advice for individual conditions or treatments  Talk to your doctor, nurse or pharmacist before following any medical regimen to see if it is safe and effective for you  © 2017 [facility] Information is for End User's use only and may not be sold, redistributed or otherwise used for commercial purposes  All illustrations and images included in CareNotes® are the copyrighted property of [employer]  or Jake Killian

## 2020-08-08 LAB
AMPHETAMINES UR QL SCN: NEGATIVE NG/ML
BARBITURATES UR QL SCN: NEGATIVE NG/ML
BENZODIAZ UR QL: NEGATIVE NG/ML
BZE UR QL: NEGATIVE NG/ML
CANNABINOIDS UR QL SCN: NEGATIVE NG/ML
METHADONE UR QL SCN: NEGATIVE NG/ML
OPIATES UR QL: NEGATIVE NG/ML
PCP UR QL: NEGATIVE NG/ML
PROPOXYPH UR QL SCN: NEGATIVE NG/ML

## 2020-08-11 LAB — MISCELLANEOUS LAB TEST RESULT: NORMAL

## 2021-02-05 DIAGNOSIS — E03.8 HYPOTHYROIDISM DUE TO HASHIMOTO'S THYROIDITIS: ICD-10-CM

## 2021-02-05 DIAGNOSIS — E06.3 HYPOTHYROIDISM DUE TO HASHIMOTO'S THYROIDITIS: ICD-10-CM

## 2021-02-05 RX ORDER — LEVOTHYROXINE SODIUM 0.2 MG/1
200 TABLET ORAL EVERY MORNING
Qty: 30 TABLET | Refills: 5 | Status: SHIPPED | OUTPATIENT
Start: 2021-02-05 | End: 2021-07-30

## 2021-03-26 ENCOUNTER — OFFICE VISIT (OUTPATIENT)
Dept: INTERNAL MEDICINE CLINIC | Facility: CLINIC | Age: 51
End: 2021-03-26
Payer: COMMERCIAL

## 2021-03-26 VITALS
HEIGHT: 70 IN | HEART RATE: 87 BPM | SYSTOLIC BLOOD PRESSURE: 120 MMHG | OXYGEN SATURATION: 97 % | TEMPERATURE: 96.3 F | DIASTOLIC BLOOD PRESSURE: 76 MMHG | BODY MASS INDEX: 33.43 KG/M2 | WEIGHT: 233.5 LBS

## 2021-03-26 DIAGNOSIS — E06.3 HYPOTHYROIDISM DUE TO HASHIMOTO'S THYROIDITIS: Primary | ICD-10-CM

## 2021-03-26 DIAGNOSIS — E03.8 HYPOTHYROIDISM DUE TO HASHIMOTO'S THYROIDITIS: Primary | ICD-10-CM

## 2021-03-26 DIAGNOSIS — D64.9 ANEMIA, UNSPECIFIED TYPE: ICD-10-CM

## 2021-03-26 DIAGNOSIS — Z72.0 CHEWS TOBACCO: ICD-10-CM

## 2021-03-26 DIAGNOSIS — E78.2 MIXED HYPERLIPIDEMIA: ICD-10-CM

## 2021-03-26 DIAGNOSIS — J45.20 MILD INTERMITTENT ASTHMA WITHOUT COMPLICATION: ICD-10-CM

## 2021-03-26 DIAGNOSIS — G47.9 SLEEP DISTURBANCES: ICD-10-CM

## 2021-03-26 DIAGNOSIS — F41.1 GENERALIZED ANXIETY DISORDER: ICD-10-CM

## 2021-03-26 DIAGNOSIS — Z12.11 SCREEN FOR COLON CANCER: ICD-10-CM

## 2021-03-26 PROCEDURE — 99214 OFFICE O/P EST MOD 30 MIN: CPT | Performed by: INTERNAL MEDICINE

## 2021-03-26 NOTE — PROGRESS NOTES
BMI Counseling: Body mass index is 33 5 kg/m²  The BMI is above normal  Nutrition recommendations include decreasing portion sizes and encouraging healthy choices of fruits and vegetables  Exercise recommendations include moderate physical activity 150 minutes/week  No pharmacotherapy was ordered  Assessment/Plan:  Problem List Items Addressed This Visit        Endocrine    Hypothyroidism - Primary       Respiratory    Asthma       Other    Sleep disturbances    Hyperlipidemia    Relevant Orders    Comprehensive metabolic panel    Lipid Panel with Direct LDL reflex    TSH, 3rd generation    Generalized anxiety disorder    Chews tobacco    Anemia      Other Visit Diagnoses     Screen for colon cancer        Relevant Orders    Ambulatory referral to Gastroenterology           Diagnoses and all orders for this visit:    Hypothyroidism due to Hashimoto's thyroiditis    Mixed hyperlipidemia  -     Comprehensive metabolic panel; Future  -     Lipid Panel with Direct LDL reflex; Future  -     TSH, 3rd generation; Future    Generalized anxiety disorder    Anemia, unspecified type    Mild intermittent asthma without complication    Sleep disturbances    Chews tobacco    Screen for colon cancer  -     Ambulatory referral to Gastroenterology; Future        No problem-specific Assessment & Plan notes found for this encounter  A/P: Doing well and will check labs  Discussed BMI and will give information on diet and exercise  Continue current treatment and RTC six months for routine  Wean tobacco     Subjective:      Patient ID: Marquita Douglas is a 53 y o  male  WM RTC for f/u hypothyroidism, hyperlipidemia, etc  Doing well and no new issues  Remains active w/o difficulty and no falls  RAD is controlled and limited VALERIA use  AZALEA is controlled  Still chews tobacco, but sees a DDS regularly  Due for labs         The following portions of the patient's history were reviewed and updated as appropriate:   He has a past medical history of Anemia, Asthma, Chronic kidney disease, Disease of thyroid gland, Generalized anxiety disorder, Kidney stone, and Lyme disease  ,  does not have any pertinent problems on file  ,   has a past surgical history that includes Knee arthroscopy  ,  family history includes No Known Problems in his father and mother  ,   reports that he has never smoked  His smokeless tobacco use includes chew  He reports current alcohol use  He reports that he does not use drugs  ,  has No Known Allergies     Current Outpatient Medications   Medication Sig Dispense Refill    fluticasone (FLONASE) 50 mcg/act nasal spray 1 spray into each nostril daily 16 g 0    levothyroxine 200 mcg tablet Take 1 tablet (200 mcg total) by mouth every morning 30 tablet 5    meloxicam (MOBIC) 15 mg tablet Take 1 tablet (15 mg total) by mouth daily 90 tablet 3    ALPRAZolam (XANAX) 0 5 mg tablet Take 1 tablet by mouth every 8 (eight) hours as needed       No current facility-administered medications for this visit  Review of Systems   Constitutional: Negative for activity change, chills, diaphoresis, fatigue and fever  HENT: Negative  Eyes: Negative for visual disturbance  Respiratory: Negative for cough, chest tightness, shortness of breath and wheezing  Cardiovascular: Negative for chest pain, palpitations and leg swelling  Gastrointestinal: Negative for abdominal pain, constipation, diarrhea, nausea and vomiting  Endocrine: Negative for cold intolerance and heat intolerance  Genitourinary: Negative for difficulty urinating, dysuria and frequency  Musculoskeletal: Negative for arthralgias, gait problem and myalgias  Neurological: Negative for dizziness, seizures, syncope, weakness, light-headedness and headaches  Psychiatric/Behavioral: Negative for confusion, dysphoric mood and sleep disturbance  The patient is not nervous/anxious          PHQ-9 Depression Screening    PHQ-9:   Frequency of the following problems over the past two weeks:      Little interest or pleasure in doing things: 0 - not at all  Feeling down, depressed, or hopeless: 0 - not at all  PHQ-2 Score: 0        Objective:  Vitals:    03/26/21 0806   BP: 120/76   Pulse: 87   Temp: (!) 96 3 °F (35 7 °C)   SpO2: 97%   Weight: 106 kg (233 lb 8 oz)   Height: 5' 10" (1 778 m)     Body mass index is 33 5 kg/m²  Physical Exam  Vitals signs and nursing note reviewed  Constitutional:       General: He is not in acute distress  Appearance: Normal appearance  He is not ill-appearing  HENT:      Head: Normocephalic and atraumatic  Mouth/Throat:      Mouth: Mucous membranes are moist    Eyes:      Extraocular Movements: Extraocular movements intact  Conjunctiva/sclera: Conjunctivae normal       Pupils: Pupils are equal, round, and reactive to light  Neck:      Musculoskeletal: Neck supple  Vascular: No carotid bruit  Cardiovascular:      Rate and Rhythm: Normal rate and regular rhythm  Heart sounds: Normal heart sounds  Pulmonary:      Effort: Pulmonary effort is normal  No respiratory distress  Breath sounds: Normal breath sounds  No wheezing or rales  Abdominal:      General: Bowel sounds are normal  There is no distension  Palpations: Abdomen is soft  Tenderness: There is no abdominal tenderness  Musculoskeletal:      Right lower leg: No edema  Left lower leg: No edema  Neurological:      General: No focal deficit present  Mental Status: He is alert and oriented to person, place, and time  Mental status is at baseline  Psychiatric:         Mood and Affect: Mood normal          Behavior: Behavior normal          Thought Content: Thought content normal          Judgment: Judgment normal          BMI Counseling: Body mass index is 33 5 kg/m²   The BMI is above normal  Nutrition recommendations include reducing portion sizes, decreasing overall calorie intake, reducing intake of saturated fat and trans fat and reducing intake of cholesterol  Exercise recommendations include moderate aerobic physical activity for 150 minutes/week

## 2021-03-26 NOTE — PATIENT INSTRUCTIONS
Hyperlipidemia   WHAT YOU NEED TO KNOW:   What is hyperlipidemia? Hyperlipidemia is a high level of lipids (fats) in your blood  These lipids include cholesterol or triglycerides  Lipids are made by your body  They also come from the foods you eat  Your body needs lipids to work properly, but high levels increase your risk for heart disease, heart attack, and stroke  What increases my risk for hyperlipidemia? · Family history of high lipid levels    · Diet high in saturated fats, cholesterol, or calories     · High alcohol intake or smoking    · Lack of regular physical activity    · Medical conditions such as hypothyroidism, obesity, or type 2 diabetes    · Certain medicines, such as blood pressure medicines, hormones, and steroids    How is hyperlipidemia managed and treated? Your healthcare provider may first recommend that you make lifestyle changes to help decrease your lipid levels  You may also need to take medicine to lower your lipid levels  Some of the lifestyle changes you may need to make include the following:  · Maintain a healthy weight  Ask your healthcare provider how much you should weigh  Ask him or her to help you create a weight loss plan if you are overweight  Weight loss can decrease your cholesterol and triglyceride levels  · Exercise as directed  Exercise lowers your cholesterol levels and helps you maintain a healthy weight  Get 30 minutes or more of aerobic exercise 4 to 6 days each week  You can split your exercise into four 10-minute workouts instead of 30 minutes at one time  Examples of aerobic exercises include walking briskly, swimming, or riding a bike  Work with your healthcare provider to plan the best exercise program for you  · Do not smoke  Nicotine and other chemicals in cigarettes and cigars can increase your risk for a heart attack and stroke  Ask your healthcare provider for information if you currently smoke and need help to quit   E-cigarettes or smokeless tobacco still contain nicotine  Talk to your healthcare provider before you use these products  · Eat heart-healthy foods  Talk to your dietitian about a heart-healthy diet  The following will help you manage hyperlipidemia:     ? Decrease the total amount of fat you eat  Choose lean meats, fat-free or 1% fat milk, and low-fat dairy products, such as yogurt and cheese  Limit or do not eat red meat  Red meats are high in fat and cholesterol  ? Replace unhealthy fats with healthy fats  Unhealthy fats include saturated fat, trans fat, and cholesterol  Choose soft margarines that are low in saturated fat and have little or no trans fat  Monounsaturated fats are healthy fats  These are found in olive oil, canola oil, avocado, and nuts  Polyunsaturated fats are also healthy  These are found in fish, flaxseed, walnuts, and soybeans  ? Eat 5 or more servings of fruits and vegetables every day  They are low in calories and fat, and a good source of essential vitamins  Include dark green, red, and orange vegetables  Examples include spinach, kale, broccoli, and carrots  ? Eat foods high in fiber  Fiber can help lower your cholesterol levels  Choose whole grain, high-fiber foods  Good choices include whole-wheat breads or cereals, beans, peas, fruits, and vegetables  · Ask your healthcare provider if it is safe for you to drink alcohol  Alcohol can increase your cholesterol and triglyceride levels  A drink of alcohol is 12 ounces of beer, 5 ounces of wine, or 1½ ounces of liquor  Call 911 for any of the following:   · You have any of the following signs of a heart attack:      ? Squeezing, pressure, or pain in your chest    ? You may  also have any of the following:     ? Discomfort or pain in your back, neck, jaw, stomach, or arm    ? Shortness of breath    ? Nausea or vomiting    ?  Lightheadedness or a sudden cold sweat    · You have any of the following signs of a stroke:      ? Numbness or drooping on one side of your face     ? Weakness in an arm or leg    ? Confusion or difficulty speaking    ? Dizziness, a severe headache, or vision loss    When should I contact my healthcare provider? · You have questions or concerns about your condition or care  CARE AGREEMENT:   You have the right to help plan your care  Learn about your health condition and how it may be treated  Discuss treatment options with your healthcare providers to decide what care you want to receive  You always have the right to refuse treatment  The above information is an  only  It is not intended as medical advice for individual conditions or treatments  Talk to your doctor, nurse or pharmacist before following any medical regimen to see if it is safe and effective for you  © Copyright [address] Information is for End User's use only and may not be sold, redistributed or otherwise used for commercial purposes  All illustrations and images included in CareNotes® are the copyrighted property of A D A M , Inc  or [address] [place] [address]    Obesity   AMBULATORY CARE:   Obesity  is when your body mass index (BMI) is greater than 30  Your healthcare provider will use your height and weight to measure your BMI  The risks of obesity include  many health problems, such as injuries or physical disability  You may need tests to check for the following:  · Diabetes    · High blood pressure or high cholesterol    · Heart disease    · Gallbladder or liver disease    · Cancer of the colon, breast, prostate, liver, or kidney    · Sleep apnea    · Arthritis or gout    Seek care immediately if:   · You have a severe headache, confusion, or difficulty speaking  · You have weakness on one side of your body  · You have chest pain, sweating, or shortness of breath  Contact your healthcare provider if:   · You have symptoms of gallbladder or liver disease, such as pain in your upper abdomen      · You have knee or hip pain and discomfort while walking  · You have symptoms of diabetes, such as intense hunger and thirst, and frequent urination  · You have symptoms of sleep apnea, such as snoring or daytime sleepiness  · You have questions or concerns about your condition or care  Treatment for obesity  focuses on helping you lose weight to improve your health  Even a small decrease in BMI can reduce the risk for many health problems  Your healthcare provider will help you set a weight-loss goal   · Lifestyle changes  are the first step in treating obesity  These include making healthy food choices and getting regular physical activity  Your healthcare provider may suggest a weight-loss program that involves coaching, education, and therapy  · Medicine  may help you lose weight when it is used with a healthy diet and physical activity  · Surgery  can help you lose weight if you are very obese and have other health problems  There are several types of weight-loss surgery  Ask your healthcare provider for more information  Be successful losing weight:   · Set small, realistic goals  An example of a small goal is to walk for 20 minutes 5 days a week  Anther goal is to lose 5% of your body weight  · Tell friends, family members, and coworkers about your goals  and ask for their support  Ask a friend to lose weight with you, or join a weight-loss support group  · Identify foods or triggers that may cause you to overeat , and find ways to avoid them  Remove tempting high-calorie foods from your home and workplace  Place a bowl of fresh fruit on your kitchen counter  If stress causes you to eat, then find other ways to cope with stress  · Keep a diary to track what you eat and drink  Also write down how many minutes of physical activity you do each day  Weigh yourself once a week and record it in your diary  Eating changes:   You will need to eat 500 to 1,000 fewer calories each day than you currently eat to lose 1 to 2 pounds a week  The following changes will help you cut calories:  · Eat smaller portions  Use small plates, no larger than 9 inches in diameter  Fill your plate half full of fruits and vegetables  Measure your food using measuring cups until you know what a serving size looks like  · Eat 3 meals and 1 or 2 snacks each day  Plan your meals in advance  [place]  and eat at home most of the time  Eat slowly  Do not skip meals  Skipping meals can lead to overeating later in the day  This can make it harder for you to lose weight  Talk with a dietitian to help you make a meal plan and schedule that is right for you  · Eat fruits and vegetables at every meal   They are low in calories and high in fiber, which makes you feel full  Do not add butter, margarine, or cream sauce to vegetables  Use herbs to season steamed vegetables  · Eat less fat and fewer fried foods  Eat more baked or grilled chicken and fish  These protein sources are lower in calories and fat than red meat  Limit fast food  Dress your salads with olive oil and vinegar instead of bottled dressing  · Limit the amount of sugar you eat  Do not drink sugary beverages  Limit alcohol  Activity changes:  Physical activity is good for your body in many ways  It helps you burn calories and build strong muscles  It decreases stress and depression, and improves your mood  It can also help you sleep better  Talk to your healthcare provider before you begin an exercise program   · Exercise for at least 30 minutes 5 days a week  Start slowly  Set aside time each day for physical activity that you enjoy and that is convenient for you  It is best to do both weight training and an activity that increases your heart rate, such as walking, bicycling, or swimming  · Find ways to be more active  Do yard work and housecleaning  Walk up the stairs instead of using elevators   Spend your leisure time going to events that require walking, such as outdoor festivals or fairs  This extra physical activity can help you lose weight and keep it off  Follow up with your healthcare provider as directed: You may need to meet with a dietitian  Write down your questions so you remember to ask them during your visits  © Copyright [address] Information is for End User's use only and may not be sold, redistributed or otherwise used for commercial purposes  All illustrations and images included in CareNotes® are the copyrighted property of A D A M , Inc  or [facility]   The above information is an  only  It is not intended as medical advice for individual conditions or treatments  Talk to your doctor, nurse or pharmacist before following any medical regimen to see if it is safe and effective for you  Low Fat Diet   AMBULATORY CARE:   A low-fat diet  is an eating plan that is low in total fat, unhealthy fat, and cholesterol  You may need to follow a low-fat diet if you have trouble digesting or absorbing fat  You may also need to follow this diet if you have high cholesterol  You can also lower your cholesterol by increasing the amount of fiber in your diet  Soluble fiber is a type of fiber that helps to decrease cholesterol levels  Different types of fat in food:   · Limit unhealthy fats  A diet that is high in cholesterol, saturated fat, and trans fat may cause unhealthy cholesterol levels  Unhealthy cholesterol levels increase your risk of heart disease  ? Cholesterol:  Limit intake of cholesterol to less than 200 mg per day  Cholesterol is found in meat, eggs, and dairy  ? Saturated fat:  Limit saturated fat to less than 7% of your total daily calories  Ask your dietitian how many calories you need each day  Saturated fat is found in butter, cheese, ice cream, whole milk, and palm oil  Saturated fat is also found in meat, such as beef, pork, chicken skin, and processed meats   Processed meats include sausage, hot dogs, and bologna  ? Trans fat:  Avoid trans fat as much as possible  Trans fat is used in fried and baked foods  Foods that say trans fat free on the label may still have up to 0 5 grams of trans fat per serving  · Include healthy fats  Replace foods that are high in saturated and trans fat with foods high in healthy fats  This may help to decrease high cholesterol levels  ? Monounsaturated fats: These are found in avocados, nuts, and vegetable oils, such as olive, canola, and sunflower oil  ? Polyunsaturated fats: These can be found in vegetable oils, such as soybean or corn oil  Omega-3 fats can help to decrease the risk of heart disease  Omega-3 fats are found in fish, such as salmon, herring, trout, and tuna  Omega-3 fats can also be found in plant foods, such as walnuts, flaxseed, soybeans, and canola oil  Foods to limit or avoid:   · Grains:      ? Snacks that are made with partially hydrogenated oils, such as chips, regular crackers, and butter-flavored popcorn    ? High-fat baked goods, such as biscuits, croissants, doughnuts, pies, cookies, and pastries    · Dairy:      ? Whole milk, 2% milk, and yogurt and ice cream made with whole milk    ? Half and half creamer, heavy cream, and whipping cream    ? Cheese, cream cheese, and sour cream    · Meats and proteins:      ? High-fat cuts of meat (T-bone steak, regular hamburger, and ribs)    ? Fried meat, poultry (turkey and chicken), and fish    ? Poultry (chicken and turkey) with skin    ? Cold cuts (salami or bologna), hot dogs, velasco, and sausage    ? Whole eggs and egg yolks    · Vegetables and fruits with added fat:      ? Fried vegetables or vegetables in butter or high-fat sauces, such as cream or cheese sauces    ? Fried fruit or fruit served with butter or cream    · Fats:      ? Butter, stick margarine, and shortening    ? Coconut, palm oil, and palm kernel oil    Foods to include:   · Grains:      ?  Whole-grain breads, cereals, pasta, and brown rice    ? Low-fat crackers and pretzels    · Vegetables and fruits:      ? Fresh, frozen, or canned vegetables (no salt or low-sodium)    ? Fresh, frozen, dried, or canned fruit (canned in light syrup or fruit juice)    ? Avocado    · Low-fat dairy products:      ? Nonfat (skim) or 1% milk    ? Nonfat or low-fat cheese, yogurt, and cottage cheese    · Meats and proteins:      ? Chicken or turkey with no skin    ? Baked or broiled fish    ? Lean beef and pork (loin, round, extra lean hamburger)    ? Beans and peas, unsalted nuts, soy products    ? Egg whites and substitutes    ? Seeds and nuts    · Fats:      ? Unsaturated oil, such as canola, olive, peanut, soybean, or sunflower oil    ? Soft or liquid margarine and vegetable oil spread    ? Low-fat salad dressing    Other ways to decrease fat:   · Read food labels before you buy foods  Choose foods that have less than 30% of calories from fat  Choose low-fat or fat-free dairy products  Remember that fat free does not mean calorie free  These foods still contain calories, and too many calories can lead to weight gain  · Trim fat from meat and avoid fried food  Trim all visible fat from meat before you cook it  Remove the skin from poultry  Do not [place] meat, fish, or poultry  Bake, roast, boil, or broil these foods instead  Avoid fried foods  Eat a baked potato instead of Western Sindhu fries  Steam vegetables instead of sautéing them in butter  · Add less fat to foods  Use imitation velasco bits on salads and baked potatoes instead of regular velasco bits  Use fat-free or low-fat salad dressings instead of regular dressings  Use low-fat or nonfat butter-flavored topping instead of regular butter or margarine on popcorn and other foods  Ways to decrease fat in recipes:  Replace high-fat ingredients with low-fat or nonfat ones   This may cause baked goods to be drier than usual  You may need to use nonfat cooking spray on pans to prevent food from sticking  You also may need to change the amount of other ingredients, such as water, in the recipe  Try the following:  · Use low-fat or light margarine instead of regular margarine or shortening  · Use lean ground turkey breast or chicken, or lean ground beef (less than 5% fat) instead of hamburger  · Add 1 teaspoon of canola oil to 8 ounces of skim milk instead of using cream or half and half  · Use grated zucchini, carrots, or apples in breads instead of coconut  · Use blenderized, low-fat cottage cheese, plain tofu, or low-fat ricotta cheese instead of cream cheese  · Use 1 egg white and 1 teaspoon of canola oil, or use ¼ cup (2 ounces) of fat-free egg substitute instead of a whole egg  · Replace half of the oil that is called for in a recipe with applesauce when you bake  Use 3 tablespoons of cocoa powder and 1 tablespoon of canola oil instead of a square of baking chocolate  How to increase fiber:  Eat enough high-fiber foods to get 20 to 30 grams of fiber every day  Slowly increase your fiber intake to avoid stomach cramps, gas, and other problems  · Eat 3 ounces of whole-grain foods each day  An ounce is about 1 slice of bread  Eat whole-grain breads, such as whole-wheat bread  Whole wheat, whole-wheat flour, or other whole grains should be listed as the first ingredient on the food label  Replace white flour with whole-grain flour or use half of each in recipes  Whole-grain flour is heavier than white flour, so you may have to add more yeast or baking powder  · Eat a high-fiber cereal for breakfast   Oatmeal is a good source of soluble fiber  Look for cereals that have bran or fiber in the name  Choose whole-grain products, such as brown rice, barley, and whole-wheat pasta  · Eat more beans, peas, and lentils  For example, add beans to soups or salads  Eat at least 5 cups of fruits and vegetables each day   Eat fruits and vegetables with the peel because the peel is high in fiber  © Copyright [address] Information is for End User's use only and may not be sold, redistributed or otherwise used for commercial purposes  All illustrations and images included in CareNotes® are the copyrighted property of A D A M , Inc  or Wilfred Villalpando  The above information is an  only  It is not intended as medical advice for individual conditions or treatments  Talk to your doctor, nurse or pharmacist before following any medical regimen to see if it is safe and effective for you  Heart Healthy Diet   AMBULATORY CARE:   A heart healthy diet  is an eating plan low in unhealthy fats and sodium (salt)  The plan is high in healthy fats and fiber  A heart healthy diet helps improve your cholesterol levels and lowers your risk for heart disease and stroke  A dietitian will teach you how to read and understand food labels  Heart healthy diet guidelines to follow:   · Choose foods that contain healthy fats  ? Unsaturated fats  include monounsaturated and polyunsaturated fats  Unsaturated fat is found in foods such as soybean, canola, olive, corn, and safflower oils  It is also found in soft tub margarine that is made with liquid vegetable oil  ? Omega-3 fat  is found in certain fish, such as salmon, tuna, and trout, and in walnuts and flaxseed  Eat fish high in omega-3 fats at least 2 times a week  · Get 20 to 30 grams of fiber each day  Fruits, vegetables, whole-grain foods, and legumes (cooked beans) are good sources of fiber  · Limit or do not have unhealthy fats  ? Cholesterol  is found in animal foods, such as eggs and lobster, and in dairy products made from whole milk  Limit cholesterol to less than 200 mg each day  ? Saturated fat  is found in meats, such as velasco and hamburger  It is also found in chicken or turkey skin, whole milk, and butter   Limit saturated fat to less than 7% of your total daily calories  ? Trans fat  is found in packaged foods, such as potato chips and cookies  It is also in hard margarine, some fried foods, and shortening  Do not eat foods that contain trans fats  · Limit sodium as directed  You may be told to limit sodium to 2,000 to 2,300 mg each day  Choose low-sodium or no-salt-added foods  Add little or no salt to food you prepare  Use herbs and spices in place of salt  Include the following in your heart healthy plan:  Ask your dietitian or healthcare provider how many servings to have from each of the following food groups:  · Grains:      ? Whole-wheat breads, cereals, and pastas, and brown rice    ? Low-fat, low-sodium crackers and chips    · Vegetables:      ? Broccoli, green beans, green peas, and spinach    ? Collards, kale, and lima beans    ? Carrots, sweet potatoes, tomatoes, and peppers    ? Canned vegetables with no salt added    · Fruits:      ? Bananas, peaches, pears, and pineapple    ? Grapes, raisins, and dates    ? Oranges, tangerines, grapefruit, orange juice, and grapefruit juice    ? Apricots, mangoes, melons, and papaya    ? Raspberries and strawberries    ? Canned fruit with no added sugar    · Low-fat dairy:      ? Nonfat (skim) milk, 1% milk, and low-fat almond, cashew, or soy milks fortified with calcium    ? Low-fat cheese, regular or frozen yogurt, and cottage cheese    · Meats and proteins:      ? Lean cuts of beef and pork (loin, leg, round), skinless chicken and turkey    ? Legumes, soy products, egg whites, or nuts    Limit or do not include the following in your heart healthy plan:   · Unhealthy fats and oils:      ? Whole or 2% milk, cream cheese, sour cream, or cheese    ? High-fat cuts of beef (T-bone steaks, ribs), chicken or turkey with skin, and organ meats such as liver    ?  Butter, stick margarine, shortening, and cooking oils such as coconut or palm oil    · Foods and liquids high in sodium:      ? Packaged foods, such as frozen dinners, cookies, macaroni and cheese, and cereals with more than 300 mg of sodium per serving    ? Vegetables with added sodium, such as instant potatoes, vegetables with added sauces, or regular canned vegetables    ? Cured or smoked meats, such as hot dogs, velasco, and sausage    ? High-sodium ketchup, barbecue sauce, salad dressing, pickles, olives, soy sauce, or miso    · Foods and liquids high in sugar:      ? Candy, cake, cookies, pies, or doughnuts    ? Soft drinks (soda), sports drinks, or sweetened tea    ? Canned or dry mixes for cakes, soups, sauces, or gravies    Other healthy heart guidelines:   · Do not smoke  Nicotine and other chemicals in cigarettes and cigars can cause lung and heart damage  Ask your healthcare provider for information if you currently smoke and need help to quit  E-cigarettes or smokeless tobacco still contain nicotine  Talk to your healthcare provider before you use these products  · Limit or do not drink alcohol as directed  Alcohol can damage your heart and raise your blood pressure  Your healthcare provider may give you specific daily and weekly limits  The general recommended limit is 1 drink a day for women 21 or older and for men 72 or older  Do not have more than 3 drinks in a day or 7 in a week  The recommended limit is 2 drinks a day for men 24to 59years of age  Do not have more than 4 drinks in a day or 14 in a week  A drink of alcohol is 12 ounces of beer, 5 ounces of wine, or 1½ ounces of liquor  · Exercise regularly  Exercise can help you maintain a healthy weight and improve your blood pressure and cholesterol levels  Regular exercise can also decrease your risk for heart problems  Ask your healthcare provider about the best exercise plan for you  Do not start an exercise program without asking your healthcare provider         Follow up with your doctor or cardiologist as directed:  Write down your questions so you remember to ask them during your visits  © Copyright [address] [place] [address] Information is for End User's use only and may not be sold, redistributed or otherwise used for commercial purposes  All illustrations and images included in CareNotes® are the copyrighted property of A D A M , Inc  or Wilfred Villalpando  The above information is an  only  It is not intended as medical advice for individual conditions or treatments  Talk to your doctor, nurse or pharmacist before following any medical regimen to see if it is safe and effective for you

## 2021-04-19 ENCOUNTER — APPOINTMENT (OUTPATIENT)
Dept: LAB | Facility: CLINIC | Age: 51
End: 2021-04-19
Payer: COMMERCIAL

## 2021-04-19 ENCOUNTER — TRANSCRIBE ORDERS (OUTPATIENT)
Dept: LAB | Facility: CLINIC | Age: 51
End: 2021-04-19

## 2021-04-19 DIAGNOSIS — E78.2 MIXED HYPERLIPIDEMIA: ICD-10-CM

## 2021-04-19 LAB
ALBUMIN SERPL BCP-MCNC: 3.5 G/DL (ref 3.5–5)
ALP SERPL-CCNC: 96 U/L (ref 46–116)
ALT SERPL W P-5'-P-CCNC: 28 U/L (ref 12–78)
ANION GAP SERPL CALCULATED.3IONS-SCNC: 5 MMOL/L (ref 4–13)
AST SERPL W P-5'-P-CCNC: 36 U/L (ref 5–45)
BILIRUB SERPL-MCNC: 0.79 MG/DL (ref 0.2–1)
BUN SERPL-MCNC: 21 MG/DL (ref 5–25)
CALCIUM SERPL-MCNC: 8.9 MG/DL (ref 8.3–10.1)
CHLORIDE SERPL-SCNC: 110 MMOL/L (ref 100–108)
CHOLEST SERPL-MCNC: 179 MG/DL (ref 50–200)
CO2 SERPL-SCNC: 22 MMOL/L (ref 21–32)
CREAT SERPL-MCNC: 1.27 MG/DL (ref 0.6–1.3)
GFR SERPL CREATININE-BSD FRML MDRD: 66 ML/MIN/1.73SQ M
GLUCOSE P FAST SERPL-MCNC: 105 MG/DL (ref 65–99)
HDLC SERPL-MCNC: 50 MG/DL
LDLC SERPL CALC-MCNC: 75 MG/DL (ref 0–100)
POTASSIUM SERPL-SCNC: 5.2 MMOL/L (ref 3.5–5.3)
PROT SERPL-MCNC: 7.4 G/DL (ref 6.4–8.2)
SODIUM SERPL-SCNC: 137 MMOL/L (ref 136–145)
TRIGL SERPL-MCNC: 271 MG/DL
TSH SERPL DL<=0.05 MIU/L-ACNC: 0.39 UIU/ML (ref 0.36–3.74)

## 2021-04-19 PROCEDURE — 84443 ASSAY THYROID STIM HORMONE: CPT

## 2021-04-19 PROCEDURE — 80061 LIPID PANEL: CPT

## 2021-04-19 PROCEDURE — 36415 COLL VENOUS BLD VENIPUNCTURE: CPT

## 2021-04-19 PROCEDURE — 80053 COMPREHEN METABOLIC PANEL: CPT

## 2021-07-29 DIAGNOSIS — E03.8 HYPOTHYROIDISM DUE TO HASHIMOTO'S THYROIDITIS: ICD-10-CM

## 2021-07-29 DIAGNOSIS — E06.3 HYPOTHYROIDISM DUE TO HASHIMOTO'S THYROIDITIS: ICD-10-CM

## 2021-07-30 RX ORDER — LEVOTHYROXINE SODIUM 0.2 MG/1
TABLET ORAL
Qty: 30 TABLET | Refills: 2 | Status: SHIPPED | OUTPATIENT
Start: 2021-07-30 | End: 2021-12-01

## 2021-09-26 DIAGNOSIS — E78.2 MIXED HYPERLIPIDEMIA: ICD-10-CM

## 2021-09-26 DIAGNOSIS — E03.8 HYPOTHYROIDISM DUE TO HASHIMOTO'S THYROIDITIS: Primary | ICD-10-CM

## 2021-09-26 DIAGNOSIS — F41.1 GENERALIZED ANXIETY DISORDER: ICD-10-CM

## 2021-09-26 DIAGNOSIS — Z12.5 SCREENING FOR PROSTATE CANCER: ICD-10-CM

## 2021-09-26 DIAGNOSIS — D64.9 ANEMIA, UNSPECIFIED TYPE: ICD-10-CM

## 2021-09-26 DIAGNOSIS — E06.3 HYPOTHYROIDISM DUE TO HASHIMOTO'S THYROIDITIS: Primary | ICD-10-CM

## 2021-09-29 ENCOUNTER — RA CDI HCC (OUTPATIENT)
Dept: OTHER | Facility: HOSPITAL | Age: 51
End: 2021-09-29

## 2021-09-29 NOTE — PROGRESS NOTES
[facility]       Chart reviewed, no opportunity found: CHART REVIEWED, NO OPPORTUNITY FOUND                        Patients insurance company:  [employer] [facility] (Medicare Advantage and Commercial)

## 2021-10-08 ENCOUNTER — APPOINTMENT (OUTPATIENT)
Dept: LAB | Facility: CLINIC | Age: 51
End: 2021-10-08
Payer: COMMERCIAL

## 2021-10-08 ENCOUNTER — OFFICE VISIT (OUTPATIENT)
Dept: INTERNAL MEDICINE CLINIC | Facility: CLINIC | Age: 51
End: 2021-10-08
Payer: COMMERCIAL

## 2021-10-08 VITALS
DIASTOLIC BLOOD PRESSURE: 70 MMHG | WEIGHT: 240.25 LBS | SYSTOLIC BLOOD PRESSURE: 118 MMHG | OXYGEN SATURATION: 94 % | BODY MASS INDEX: 34.4 KG/M2 | HEIGHT: 70 IN | TEMPERATURE: 98.5 F | HEART RATE: 74 BPM

## 2021-10-08 DIAGNOSIS — E06.3 HYPOTHYROIDISM DUE TO HASHIMOTO'S THYROIDITIS: ICD-10-CM

## 2021-10-08 DIAGNOSIS — D64.9 ANEMIA, UNSPECIFIED TYPE: ICD-10-CM

## 2021-10-08 DIAGNOSIS — F41.1 GENERALIZED ANXIETY DISORDER: ICD-10-CM

## 2021-10-08 DIAGNOSIS — E03.8 HYPOTHYROIDISM DUE TO HASHIMOTO'S THYROIDITIS: ICD-10-CM

## 2021-10-08 DIAGNOSIS — E78.2 MIXED HYPERLIPIDEMIA: ICD-10-CM

## 2021-10-08 DIAGNOSIS — Z00.00 WELL ADULT EXAM: Primary | ICD-10-CM

## 2021-10-08 DIAGNOSIS — Z11.59 NEED FOR HEPATITIS C SCREENING TEST: ICD-10-CM

## 2021-10-08 DIAGNOSIS — Z12.5 SCREENING FOR PROSTATE CANCER: ICD-10-CM

## 2021-10-08 DIAGNOSIS — J45.20 MILD INTERMITTENT ASTHMA WITHOUT COMPLICATION: ICD-10-CM

## 2021-10-08 DIAGNOSIS — Z12.11 SCREEN FOR COLON CANCER: ICD-10-CM

## 2021-10-08 DIAGNOSIS — L30.9 DERMATITIS: ICD-10-CM

## 2021-10-08 DIAGNOSIS — Z23 ENCOUNTER FOR VACCINATION: ICD-10-CM

## 2021-10-08 LAB
ALBUMIN SERPL BCP-MCNC: 3.9 G/DL (ref 3.5–5)
ALP SERPL-CCNC: 106 U/L (ref 46–116)
ALT SERPL W P-5'-P-CCNC: 30 U/L (ref 12–78)
ANION GAP SERPL CALCULATED.3IONS-SCNC: 3 MMOL/L (ref 4–13)
AST SERPL W P-5'-P-CCNC: 18 U/L (ref 5–45)
BASOPHILS # BLD AUTO: 0.05 THOUSANDS/ΜL (ref 0–0.1)
BASOPHILS NFR BLD AUTO: 1 % (ref 0–1)
BILIRUB SERPL-MCNC: 0.52 MG/DL (ref 0.2–1)
BUN SERPL-MCNC: 21 MG/DL (ref 5–25)
CALCIUM SERPL-MCNC: 9.1 MG/DL (ref 8.3–10.1)
CHLORIDE SERPL-SCNC: 107 MMOL/L (ref 100–108)
CO2 SERPL-SCNC: 27 MMOL/L (ref 21–32)
CREAT SERPL-MCNC: 1.67 MG/DL (ref 0.6–1.3)
EOSINOPHIL # BLD AUTO: 0.27 THOUSAND/ΜL (ref 0–0.61)
EOSINOPHIL NFR BLD AUTO: 5 % (ref 0–6)
ERYTHROCYTE [DISTWIDTH] IN BLOOD BY AUTOMATED COUNT: 12.5 % (ref 11.6–15.1)
GFR SERPL CREATININE-BSD FRML MDRD: 47 ML/MIN/1.73SQ M
GLUCOSE P FAST SERPL-MCNC: 105 MG/DL (ref 65–99)
HCT VFR BLD AUTO: 47.2 % (ref 36.5–49.3)
HCV AB SER QL: NORMAL
HGB BLD-MCNC: 15 G/DL (ref 12–17)
IMM GRANULOCYTES # BLD AUTO: 0.02 THOUSAND/UL (ref 0–0.2)
IMM GRANULOCYTES NFR BLD AUTO: 0 % (ref 0–2)
LDLC SERPL DIRECT ASSAY-MCNC: 114 MG/DL (ref 0–100)
LYMPHOCYTES # BLD AUTO: 1.51 THOUSANDS/ΜL (ref 0.6–4.47)
LYMPHOCYTES NFR BLD AUTO: 26 % (ref 14–44)
MCH RBC QN AUTO: 29 PG (ref 26.8–34.3)
MCHC RBC AUTO-ENTMCNC: 31.8 G/DL (ref 31.4–37.4)
MCV RBC AUTO: 91 FL (ref 82–98)
MONOCYTES # BLD AUTO: 0.4 THOUSAND/ΜL (ref 0.17–1.22)
MONOCYTES NFR BLD AUTO: 7 % (ref 4–12)
NEUTROPHILS # BLD AUTO: 3.57 THOUSANDS/ΜL (ref 1.85–7.62)
NEUTS SEG NFR BLD AUTO: 61 % (ref 43–75)
NRBC BLD AUTO-RTO: 0 /100 WBCS
PLATELET # BLD AUTO: 221 THOUSANDS/UL (ref 149–390)
PMV BLD AUTO: 11.6 FL (ref 8.9–12.7)
POTASSIUM SERPL-SCNC: 4.4 MMOL/L (ref 3.5–5.3)
PROT SERPL-MCNC: 7.8 G/DL (ref 6.4–8.2)
PSA SERPL-MCNC: 0.5 NG/ML (ref 0–4)
RBC # BLD AUTO: 5.18 MILLION/UL (ref 3.88–5.62)
SODIUM SERPL-SCNC: 137 MMOL/L (ref 136–145)
TRIGL SERPL-MCNC: 116 MG/DL
TSH SERPL DL<=0.05 MIU/L-ACNC: 0.55 UIU/ML (ref 0.36–3.74)
WBC # BLD AUTO: 5.82 THOUSAND/UL (ref 4.31–10.16)

## 2021-10-08 PROCEDURE — 3008F BODY MASS INDEX DOCD: CPT | Performed by: INTERNAL MEDICINE

## 2021-10-08 PROCEDURE — 99396 PREV VISIT EST AGE 40-64: CPT | Performed by: INTERNAL MEDICINE

## 2021-10-08 PROCEDURE — 86803 HEPATITIS C AB TEST: CPT

## 2021-10-08 PROCEDURE — 80053 COMPREHEN METABOLIC PANEL: CPT

## 2021-10-08 PROCEDURE — 84443 ASSAY THYROID STIM HORMONE: CPT

## 2021-10-08 PROCEDURE — 85025 COMPLETE CBC W/AUTO DIFF WBC: CPT

## 2021-10-08 PROCEDURE — 36415 COLL VENOUS BLD VENIPUNCTURE: CPT

## 2021-10-08 PROCEDURE — 1036F TOBACCO NON-USER: CPT | Performed by: INTERNAL MEDICINE

## 2021-10-08 PROCEDURE — 84478 ASSAY OF TRIGLYCERIDES: CPT

## 2021-10-08 PROCEDURE — G0103 PSA SCREENING: HCPCS

## 2021-10-08 PROCEDURE — 83721 ASSAY OF BLOOD LIPOPROTEIN: CPT

## 2021-10-08 PROCEDURE — 80307 DRUG TEST PRSMV CHEM ANLYZR: CPT | Performed by: INTERNAL MEDICINE

## 2021-10-08 RX ORDER — CLOTRIMAZOLE AND BETAMETHASONE DIPROPIONATE 10; .64 MG/G; MG/G
CREAM TOPICAL 2 TIMES DAILY
Qty: 45 G | Refills: 0 | Status: SHIPPED | OUTPATIENT
Start: 2021-10-08

## 2021-11-30 DIAGNOSIS — E06.3 HYPOTHYROIDISM DUE TO HASHIMOTO'S THYROIDITIS: ICD-10-CM

## 2021-11-30 DIAGNOSIS — E03.8 HYPOTHYROIDISM DUE TO HASHIMOTO'S THYROIDITIS: ICD-10-CM

## 2021-12-01 RX ORDER — LEVOTHYROXINE SODIUM 0.2 MG/1
TABLET ORAL
Qty: 30 TABLET | Refills: 0 | Status: SHIPPED | OUTPATIENT
Start: 2021-12-01 | End: 2022-01-04

## 2022-01-03 DIAGNOSIS — E03.8 HYPOTHYROIDISM DUE TO HASHIMOTO'S THYROIDITIS: ICD-10-CM

## 2022-01-03 DIAGNOSIS — E06.3 HYPOTHYROIDISM DUE TO HASHIMOTO'S THYROIDITIS: ICD-10-CM

## 2022-01-04 RX ORDER — LEVOTHYROXINE SODIUM 0.2 MG/1
TABLET ORAL
Qty: 30 TABLET | Refills: 0 | Status: SHIPPED | OUTPATIENT
Start: 2022-01-04 | End: 2022-02-07

## 2022-02-03 ENCOUNTER — OFFICE VISIT (OUTPATIENT)
Dept: URGENT CARE | Facility: CLINIC | Age: 52
End: 2022-02-03
Payer: COMMERCIAL

## 2022-02-03 VITALS
RESPIRATION RATE: 20 BRPM | TEMPERATURE: 97 F | WEIGHT: 240 LBS | BODY MASS INDEX: 34.44 KG/M2 | OXYGEN SATURATION: 98 % | HEART RATE: 78 BPM

## 2022-02-03 DIAGNOSIS — J02.8 ACUTE PHARYNGITIS DUE TO OTHER SPECIFIED ORGANISMS: ICD-10-CM

## 2022-02-03 DIAGNOSIS — J06.9 ACUTE UPPER RESPIRATORY INFECTION: Primary | ICD-10-CM

## 2022-02-03 DIAGNOSIS — R05.1 ACUTE COUGH: ICD-10-CM

## 2022-02-03 DIAGNOSIS — Z87.09 HISTORY OF ASTHMA: ICD-10-CM

## 2022-02-03 LAB — S PYO AG THROAT QL: NEGATIVE

## 2022-02-03 PROCEDURE — 87880 STREP A ASSAY W/OPTIC: CPT | Performed by: NURSE PRACTITIONER

## 2022-02-03 PROCEDURE — 99214 OFFICE O/P EST MOD 30 MIN: CPT | Performed by: NURSE PRACTITIONER

## 2022-02-03 PROCEDURE — 87070 CULTURE OTHR SPECIMN AEROBIC: CPT | Performed by: NURSE PRACTITIONER

## 2022-02-03 PROCEDURE — U0003 INFECTIOUS AGENT DETECTION BY NUCLEIC ACID (DNA OR RNA); SEVERE ACUTE RESPIRATORY SYNDROME CORONAVIRUS 2 (SARS-COV-2) (CORONAVIRUS DISEASE [COVID-19]), AMPLIFIED PROBE TECHNIQUE, MAKING USE OF HIGH THROUGHPUT TECHNOLOGIES AS DESCRIBED BY CMS-2020-01-R: HCPCS | Performed by: NURSE PRACTITIONER

## 2022-02-03 PROCEDURE — U0005 INFEC AGEN DETEC AMPLI PROBE: HCPCS | Performed by: NURSE PRACTITIONER

## 2022-02-03 RX ORDER — ALBUTEROL SULFATE 90 UG/1
2 AEROSOL, METERED RESPIRATORY (INHALATION) EVERY 6 HOURS PRN
Qty: 6.7 G | Refills: 0 | Status: SHIPPED | OUTPATIENT
Start: 2022-02-03

## 2022-02-03 RX ORDER — AZITHROMYCIN 250 MG/1
TABLET, FILM COATED ORAL
Qty: 6 TABLET | Refills: 0 | Status: SHIPPED | OUTPATIENT
Start: 2022-02-03 | End: 2022-02-07

## 2022-02-03 NOTE — PATIENT INSTRUCTIONS
You appear to have covid/symptoms  You have a covid test and throat culture pending  You are to download [facility] for the results in 24-72 hours  You will be notified if results are +  You are to take vitamin C, D, robitussin for cough  Sleep on your stomach  You are to quarantine as required per CDC guidelines  See your PCP for follow up and any return to work notes or results of testing  Go to the ED if symptoms worsen or are severe  You have been prescribed azithromycin and proventil MDI - use as directed        COVID-19 (Coronavirus Disease 2019)   WHAT YOU NEED TO KNOW:   Coronavirus disease 2019 (COVID-19) is the disease caused by a coronavirus first discovered in December 2019  Coronaviruses generally cause upper respiratory (nose, throat, and lung) infections, such as a cold  The new virus spreads quickly and easily  The virus can be spread starting 2 days before symptoms even begin  The virus has also changed into several new forms (called variants) since it was discovered  The variants may be more contagious (easily spread) than the original form  Some may also cause more severe illness than others  It is important to follow local, national, and worldwide measures to protect yourself and others from infection  DISCHARGE INSTRUCTIONS:   If you think you or someone you know may be infected:  Do the following to protect others:  · If emergency care is needed,  tell the  about the possible infection, or call ahead and tell the emergency department  · Call a healthcare provider  for instructions if symptoms are mild  Anyone who may be infected should not  arrive without calling first  The provider will need to protect staff members and other patients  · The person who may be infected needs to wear a face covering  while getting medical care  This will help lower the risk of infecting others   Coverings are not used for anyone who is younger than 2 years, has breathing problems, or cannot remove it  The provider can give you instructions for anyone who cannot wear a covering  Call your local emergency number (911 in the [address]) or an emergency department if:   · You have trouble breathing or shortness of breath at rest     · You have chest pain or pressure that lasts longer than 5 minutes  · You become confused or hard to wake  · Your lips or face are blue  · You have a fever of 104°F (40°C) or higher  Call your doctor if:   · You do not  have symptoms of COVID-19 but had close physical contact within 14 days with someone who tested positive  · You have questions or concerns about your condition or care  Medicines: You may need any of the following for mild symptoms:  · Decongestants  help reduce nasal congestion and help you breathe more easily  If you take decongestant pills, they may make you feel restless or cause problems with your sleep  Do not use decongestant sprays for more than a few days  · Cough suppressants  help reduce coughing  Ask your healthcare provider which type of cough medicine is best for you  · Acetaminophen  decreases pain and fever  It is available without a doctor's order  Ask how much to take and how often to take it  Follow directions  Read the labels of all other medicines you are using to see if they also contain acetaminophen, or ask your doctor or pharmacist  Acetaminophen can cause liver damage if not taken correctly  Do not use more than 4 grams (4,000 milligrams) total of acetaminophen in one day  · NSAIDs , such as ibuprofen, help decrease swelling, pain, and fever  NSAIDs can cause stomach bleeding or kidney problems in certain people  If you take blood thinner medicine, always ask your healthcare provider if NSAIDs are safe for you  Always read the medicine label and follow directions  · Take your medicine as directed  Contact your healthcare provider if you think your medicine is not helping or if you have side effects  Tell him or her if you are allergic to any medicine  Keep a list of the medicines, vitamins, and herbs you take  Include the amounts, and when and why you take them  Bring the list or the pill bottles to follow-up visits  Carry your medicine list with you in case of an emergency  What you need to know about COVID-19 vaccines:  Get a vaccine even if you already had COVID-19  · COVID-19 vaccines are given as a shot in 1 or 2 doses  Some vaccines have emergency use authorization (EUA)  An EUA means the vaccine is not approved but is given because the benefits outweigh the risks  A 2-dose vaccine is fully approved for use in those 16 years or older  This vaccine also has an EUA for adolescents 12 to 15 years  Your healthcare provider can help you understand the benefits and risks  · A third dose is recommended for adults with a weakened immune system who get a 2-dose vaccine  The third dose is given at least 28 days after the second  · Even after you get the vaccine, continue social distancing and other measures  Experts are still learning how well the vaccines work to prevent infection, transmission, and severe illness  Although rare, you can become infected after you get the vaccine  You may also be able to pass the virus to others without knowing you are infected  · After you get the vaccine, check local, national, and international travel rules  Check to see if you need to be tested before you travel  You may also need to quarantine after you return  Some countries require proof of a negative test before you leave  You should also be tested 3 to 5 days after you return from another country  How the 2019 coronavirus spreads: The following are ways the virus is thought to spread, but more information may be coming:  · Droplets are the main way all coronaviruses spread  The virus travels in droplets that form when a person talks, coughs, or sneezes   The droplets can also float in the air for minutes or hours  Infection happens when you breathe in the droplets or get them in your eyes or nose  Close personal contact with an infected person increases your risk for infection  This means being within 6 feet (2 meters) of the person for at least 15 minutes over 24 hours  · Person-to-person contact can spread the virus  For example, a person with the virus on his or her hands can spread it by shaking hands with someone  · The virus can stay on objects and surfaces for a short time  You may become infected by touching the object or surface and then touching your eyes or mouth  · An infected animal may be able to infect a person who touches it  This may happen at live markets or on a farm  Help lower the risk for COVID-19:  The best way to prevent infection is to avoid anyone who is infected, but this can be hard to do  An infected person can spread the virus before signs or symptoms begin, or even if signs or symptoms never develop  The following can help lower the risk for infection:      · Wash your hands often throughout the day  Use soap and water  Rub your soapy hands together, lacing your fingers, for at least 20 seconds  Rinse with warm, running water  Dry your hands with a clean towel or paper towel  Use hand  that contains alcohol if soap and water are not available  Teach children how to wash their hands and use hand   · Cover sneezes and coughs  Turn your face away and cover your mouth and nose with a tissue  Throw the tissue away  Use the bend of your arm if a tissue is not available  Then wash your hands well with soap and water or use hand   Teach children how to cover a cough or sneeze  · Wear a face covering (mask) around anyone who does not live in your home  Use a cloth covering with at least 2 layers  You can also create layers by putting a cloth covering over a disposable non-medical mask  Cover your mouth and your nose   The covering should fit snugly against the bridge of your nose  Securely fasten it under your chin and on the sides of your face  Do not  wear a plastic face shield instead of a covering  Continue social distancing and washing your hands often  A face covering is not a substitute for social distancing safety measures  · Follow worldwide, national, and local social distancing guidelines  Keep at least 6 feet (2 meters) between you and others  Also keep this distance from anyone who comes to your home, such as someone making a delivery  Wear a face covering while you are around others  You will need to wear a covering in restaurants, stores, and other public buildings  You will also need a covering on mass transit, such as a bus, subway, or airplane  Remember to use a covering made from thick material or wear 2 coverings together  · Make a habit of not touching your face  If you get the virus on your hands, you can transfer it to your eyes, nose, or mouth and become infected  You can also transfer it to objects, surfaces, or people  Do not touch your eyes, nose, or mouth without washing your hands first     · Clean and disinfect high-touch surfaces and objects often  Use disinfecting wipes, or make a solution of 4 teaspoons of bleach in 1 quart (4 cups) of water  Clean and disinfect even if you think no one living in or coming to your home is infected with the virus  · Ask about other vaccines you may need  Get the influenza (flu) vaccine as soon as recommended each year, usually starting in September or October  Get the pneumonia vaccine if recommended  Your healthcare provider can tell you if you should also get other vaccines, and when to get them  Follow social distancing guidelines:  National and local social distancing rules vary  Rules may change over time as restrictions are lifted  Restrictions may return if an outbreak happens where you live   It is important to know and follow all current social distancing rules in your area  The following are general guidelines:  · Stay home if you are sick or think you may have COVID-19  It is important to stay home if you are waiting for a testing appointment or for test results  Even if you do not have symptoms, you can pass the virus to others  · Limit trips out of your home  Have food, medicines, and other supplies delivered and left at your door or other area, if possible  Plan trips out of your home so you make the fewest stops possible to limit close personal contact  Keep track of places you go  This will help contact tracers notify others if you become infected  · Avoid close physical contact with anyone who does not live in your home  Do not shake hands with, hug, or kiss a person as a greeting  If you must use public transportation (such as a bus or subway), try to sit or stand away from others  Only allow necessary people into your home  Wear your face covering, and remind others to wear a face covering  Remind them to wash their hands when they arrive and before they leave  Do not  let someone into your home or go to someone's home just to visit  Even if you both do not feel sick, the virus can pass from one of you to the other  · Avoid in-person gatherings and crowds  Gatherings or crowds of 10 or more individuals can cause the virus to spread  Avoid places such as [place], beaches, sporting events, and tourist attractions  For events such as parties, holiday meals, [employer] services, and conferences, attend virtually (on a computer), if possible  · Ask your healthcare provider for other ways to have appointments  Some providers offer phone, video, or other types of appointments  You may also be able to get prescriptions for a few months of your medicines at a time  · Stay safe if you must go out to work  Keep physical distance between you and other workers as much as possible  Follow your employer's rules so everyone stays safe      If you have COVID-19 and are recovering at home,  healthcare providers will give you specific instructions to follow  The following are general guidelines to remind you how to keep others safe until you are well:  · Wash your hands often  Use soap and water as much as possible  Use hand  that contains alcohol if soap and water are not available  Dry your hands with a clean towel or paper towel  Do not share towels with anyone  If you use paper towels, throw them away in a lined trash can kept in your room or area  Use a covered trash can, if possible  · Do not go out of your home unless it is necessary  Ask someone who is not infected to go out for groceries or supplies, or have them delivered  Do not go to your healthcare provider's office without an appointment  · Only have close physical contact with a person giving direct care, or a baby or child you must care for  Family members and friends should not visit you  If possible, stay in a separate area or room of your home if you live with others  No one should go into the area or room except to give you care  You can visit with others by phone, video chat, e-mail, or similar systems  · Wear a face covering while others are near you  This can help prevent droplets from spreading the virus when you talk, sneeze, or cough  Put the covering on before anyone comes into your room or area  Remind the person to cover his or her nose and mouth before coming in to provide care for you  · Do not share items  Do not share dishes, towels, or other items with anyone  Items need to be washed after you use them  · Protect your baby  Some newborns have tested positive for the virus  It is not known if they became infected before or after birth  The highest risk is when a  has close contact with an infected person  If you are pregnant or breastfeeding, talk to your healthcare provider or obstetrician about any concerns you have   He or she will tell you when to bring your baby in for check-ups and vaccines  He or she will also tell you what to do if you think your baby was infected with the coronavirus  Wash your hands and put on a clean face covering before you breastfeed or care for your baby  · Do not handle live animals unless it is necessary  Some animals, including pets, have been infected with the new coronavirus  Ask someone who is not infected to take care of your pet until you are well  If you must care for a pet, wear a face covering  Wash your hands before and after you give care  Talk to your healthcare provider about how to keep a service animal safe, if needed  · Follow directions from your healthcare provider for being around others after you recover  It is not known if or for how long a recovered person can pass the virus to others  Your provider may give you instructions, such as continuing social distancing and wearing a face covering  He or she will tell you when it is okay to be around others again  This may be 10 to 20 days after symptoms started or you had a positive test  Most symptoms will also need to be gone  Your provider will give you more information  Follow up with your doctor as directed:  Write down your questions so you remember to ask them during your visits  For more information:   · Centers for Disease Control and Prevention  [address] , [address]  Phone: 5- 015 - [phone]  Web Address: [employer]    © [facility] 2021 Information is for End User's use only and may not be sold, redistributed or otherwise used for commercial purposes  All illustrations and images included in CareNotes® are the copyrighted property of [employer]  or [facility] Edgardo Ivey   The above information is an  only  It is not intended as medical advice for individual conditions or treatments   Talk to your doctor, nurse or pharmacist before following any medical regimen to see if it is safe and effective for you  Pharyngitis   WHAT YOU NEED TO KNOW:   Pharyngitis, or sore throat, is inflammation of the tissues and structures in your pharynx (throat)  Pharyngitis is most often caused by bacteria  It may also be caused by a cold or flu virus  Other causes include smoking, allergies, or acid reflux  DISCHARGE INSTRUCTIONS:   Call 911 for any of the following:   · You have trouble breathing or swallowing because your throat is swollen or sore  Return to the emergency department if:   · You are drooling because it hurts too much to swallow  · Your fever is higher than 102? F (39?C) or lasts longer than 3 days  · You are confused  · You taste blood in your throat  Contact your healthcare provider if:   · Your throat pain gets worse  · You have a painful lump in your throat that does not go away after 5 days  · Your symptoms do not improve after 5 days  · You have questions or concerns about your condition or care  Medicines:  Viral pharyngitis will go away on its own without treatment  Your sore throat should start to feel better in 3 to 5 days for both viral and bacterial infections  You may need any of the following:  · Antibiotics  treat a bacterial infection  · NSAIDs , such as ibuprofen, help decrease swelling, pain, and fever  NSAIDs can cause stomach bleeding or kidney problems in certain people  If you take blood thinner medicine, always ask your healthcare provider if NSAIDs are safe for you  Always read the medicine label and follow directions  · Acetaminophen  decreases pain and fever  It is available without a doctor's order  Ask how much to take and how often to take it  Follow directions  Acetaminophen can cause liver damage if not taken correctly  · Take your medicine as directed  Contact your healthcare provider if you think your medicine is not helping or if you have side effects  Tell him or her if you are allergic to any medicine   Keep a list of the medicines, vitamins, and herbs you take  Include the amounts, and when and why you take them  Bring the list or the pill bottles to follow-up visits  Carry your medicine list with you in case of an emergency  Manage your symptoms:   · Gargle salt water  Mix ¼ teaspoon salt in an 8 ounce glass of warm water and gargle  This may help decrease swelling in your throat  · Drink liquids as directed  You may need to drink more liquids than usual  Liquids may help soothe your throat and prevent dehydration  Ask how much liquid to drink each day and which liquids are best for you  · Use a cool-steam humidifier  to help moisten the air in your room and calm your cough  · Soothe your throat  with cough drops, ice, soft foods, or popsicles  Prevent the spread of pharyngitis:  Cover your mouth and nose when you cough or sneeze  Do not share food or drinks  Wash your hands often  Use soap and water  If soap and water are unavailable, use an alcohol based hand   Follow up with your doctor as directed:  Write down your questions so you remember to ask them during your visits  © Copyright [address] 2021 Information is for End User's use only and may not be sold, redistributed or otherwise used for commercial purposes  All illustrations and images included in CareNotes® are the copyrighted property of [employer]  or Wilfred Villalpando  The above information is an  only  It is not intended as medical advice for individual conditions or treatments  Talk to your doctor, nurse or pharmacist before following any medical regimen to see if it is safe and effective for you

## 2022-02-03 NOTE — PROGRESS NOTES
[address] [facility] [employer]        NAME: Yair Pichardo is a 49 y o  male  : 1970    MRN: [id number]  DATE: February 3, 2022  TIME: 8:51 AM    Assessment and Plan   Acute upper respiratory infection [J06 9]  1  Acute upper respiratory infection     2  Acute pharyngitis due to other specified organisms  albuterol (Proventil HFA) 90 mcg/act inhaler    azithromycin (ZITHROMAX) 250 mg tablet   3  Acute cough  COVID Only -Office Collect    POCT rapid strepA    Throat culture    albuterol (Proventil HFA) 90 mcg/act inhaler    azithromycin (ZITHROMAX) 250 mg tablet   4  History of asthma  albuterol (Proventil HFA) 90 mcg/act inhaler    azithromycin (ZITHROMAX) 250 mg tablet         Patient Instructions       Follow up with PCP in 3-5 days  Proceed to  ER if symptoms worsen  You appear to have covid/symptoms  You have a covid test and throat culture pending  You are to download [email or web address] for the results in 24-72 hours  You will be notified if results are +  You are to take vitamin C, D, robitussin for cough  Sleep on your stomach  You are to quarantine as required per CDC guidelines  See your PCP for follow up and any return to work notes or results of testing  Go to the ED if symptoms worsen or are severe  You have been prescribed azithromycin and proventil MDI - use as directed            Chief Complaint     Chief Complaint   Patient presents with    Sore Throat    Cough         History of Present Illness       This is a 49year old male 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 and on Monday started with PND and Tuesday developed sorethroat  He states he now has a severe acute cough with headache  He states he is covid vaccinated with booster in November  Denies fevers, n/v/d  He states he has had chills  Review of Systems   Review of Systems   Constitutional: Positive for chills  HENT: Positive for congestion and sore throat  Eyes: Negative  Respiratory: Positive for cough  Cardiovascular: Negative  Gastrointestinal: Negative  Endocrine: Negative  Genitourinary: Negative  Musculoskeletal: Negative  Skin: Negative  Allergic/Immunologic: Negative  Neurological: Positive for headaches  Hematological: Negative  Psychiatric/Behavioral: Negative  Current Medications       Current Outpatient Medications:     clotrimazole-betamethasone (LOTRISONE) 1-0 05 % cream, Apply topically 2 (two) times a day, Disp: 45 g, Rfl: 0    fluticasone (FLONASE) 50 mcg/act nasal spray, 1 spray into each nostril daily, Disp: 16 g, Rfl: 0    levothyroxine 200 mcg tablet, TAKE 1 TABLET DAILY IN THE MORNING   , Disp: 30 tablet, Rfl: 0    meloxicam (MOBIC) 15 mg tablet, Take 1 tablet (15 mg total) by mouth daily, Disp: 90 tablet, Rfl: 3    albuterol (Proventil HFA) 90 mcg/act inhaler, Inhale 2 puffs every 6 (six) hours as needed for wheezing or shortness of breath, Disp: 6 7 g, Rfl: 0    ALPRAZolam (XANAX) 0 5 mg tablet, Take 1 tablet by mouth every 8 (eight) hours as needed (Patient not taking: Reported on 10/8/2021), Disp: , Rfl:     azithromycin (ZITHROMAX) 250 mg tablet, Take 2 tablets today then 1 tablet daily x 4 days, Disp: 6 tablet, Rfl: 0    Current Allergies     Allergies as of 02/03/2022    (No Known Allergies)            The following portions of the patient's history were reviewed and updated as appropriate: allergies, current medications, past family history, past medical history, past social history, past surgical history and problem list      Past Medical History:   Diagnosis Date    Anemia     Asthma     Chronic kidney disease     Disease of thyroid gland     Generalized anxiety disorder     Kidney stone     Lyme disease     POSITIVE EXPOSURE WITH TREATMENT       Past Surgical History:   Procedure Laterality Date    KNEE ARTHROSCOPY      LAST ASSESSED: [id number]       Family History   Problem Relation Age of Onset    No Known Problems Mother    [facility] No Known Problems Father          Medications have been verified  Objective   Pulse 78   Temp (!) 97 °F (36 1 °C)   Resp 20   Wt 109 kg (240 lb)   SpO2 98%   BMI 34 44 kg/m²   No LMP for male patient  Physical Exam     Physical Exam  Vitals and nursing note reviewed  Constitutional:       General: He is not in acute distress  Appearance: He is well-developed  He is obese  He is not ill-appearing, toxic-appearing or diaphoretic  HENT:      Head: Normocephalic and atraumatic  Right Ear: Tympanic membrane and ear canal normal       Left Ear: Tympanic membrane and ear canal normal       Nose: Congestion present  Mouth/Throat:      Mouth: Mucous membranes are moist  No oral lesions  Pharynx: Oropharynx is clear  Uvula midline  Posterior oropharyngeal erythema and uvula swelling present  No pharyngeal swelling or oropharyngeal exudate  Tonsils: No tonsillar exudate or tonsillar abscesses  Eyes:      Pupils: Pupils are equal, round, and reactive to light  Cardiovascular:      Rate and Rhythm: Normal rate and regular rhythm  Heart sounds: Normal heart sounds  Pulmonary:      Effort: Pulmonary effort is normal       Breath sounds: Normal breath sounds  Comments: Constant dry hacking cough   Abdominal:      Palpations: Abdomen is soft  Musculoskeletal:      Cervical back: Normal range of motion and neck supple  Skin:     General: Skin is warm and dry  Capillary Refill: Capillary refill takes less than 2 seconds  Neurological:      General: No focal deficit present  Mental Status: He is alert and oriented to person, place, and time     Psychiatric:         Behavior: Behavior normal

## 2022-02-03 NOTE — LETTER
February 3, 2022     Patient: Yanet North   YOB: 1970   Date of Visit: 2/3/2022       To Whom It May Concern: It is my medical opinion that Lynn Villatoro   Was seen and evaluated at [facility]  Please note if Covid and flu tests are negative, they may return to work/school on 2/7/2022 and/or when fever free for 24 hours without the use of a fever reducing agent  If covid or Flu test is positive, they may return to work/school on 2/7/2022, as this is 5 days from the onset of symptoms  Upon return, they must then adhere to strict masking for an additional 5 days       If you have any questions or concerns, please don't hesitate to call  Sincerely,        GREGG Helms    CC: Rhonda Michele

## 2022-02-04 LAB — SARS-COV-2 RNA RESP QL NAA+PROBE: NEGATIVE

## 2022-02-05 LAB — BACTERIA THROAT CULT: NORMAL

## 2022-02-07 DIAGNOSIS — E06.3 HYPOTHYROIDISM DUE TO HASHIMOTO'S THYROIDITIS: ICD-10-CM

## 2022-02-07 DIAGNOSIS — E03.8 HYPOTHYROIDISM DUE TO HASHIMOTO'S THYROIDITIS: ICD-10-CM

## 2022-02-07 RX ORDER — LEVOTHYROXINE SODIUM 0.2 MG/1
TABLET ORAL
Qty: 30 TABLET | Refills: 0 | Status: SHIPPED | OUTPATIENT
Start: 2022-02-07 | End: 2022-03-09

## 2022-03-09 DIAGNOSIS — E06.3 HYPOTHYROIDISM DUE TO HASHIMOTO'S THYROIDITIS: ICD-10-CM

## 2022-03-09 DIAGNOSIS — E03.8 HYPOTHYROIDISM DUE TO HASHIMOTO'S THYROIDITIS: ICD-10-CM

## 2022-03-09 RX ORDER — LEVOTHYROXINE SODIUM 0.2 MG/1
TABLET ORAL
Qty: 30 TABLET | Refills: 0 | Status: SHIPPED | OUTPATIENT
Start: 2022-03-09 | End: 2022-04-08 | Stop reason: SDUPTHER

## 2022-04-01 ENCOUNTER — RA CDI HCC (OUTPATIENT)
Dept: OTHER | Facility: HOSPITAL | Age: 52
End: 2022-04-01

## 2022-04-01 NOTE — PROGRESS NOTES
[facility] [[email or web address]] 75  coding opportunities       Chart reviewed, no opportunity found: CHART REVIEWED, NO OPPORTUNITY FOUND        Patients Insurance        Commercial Insurance: [employer]

## 2022-04-08 ENCOUNTER — APPOINTMENT (OUTPATIENT)
Dept: LAB | Facility: CLINIC | Age: 52
End: 2022-04-08
Payer: COMMERCIAL

## 2022-04-08 ENCOUNTER — OFFICE VISIT (OUTPATIENT)
Dept: INTERNAL MEDICINE CLINIC | Facility: CLINIC | Age: 52
End: 2022-04-08
Payer: COMMERCIAL

## 2022-04-08 VITALS
TEMPERATURE: 96.7 F | RESPIRATION RATE: 14 BRPM | HEART RATE: 66 BPM | WEIGHT: 243 LBS | SYSTOLIC BLOOD PRESSURE: 134 MMHG | OXYGEN SATURATION: 98 % | DIASTOLIC BLOOD PRESSURE: 80 MMHG | BODY MASS INDEX: 34.79 KG/M2 | HEIGHT: 70 IN

## 2022-04-08 DIAGNOSIS — E03.8 HYPOTHYROIDISM DUE TO HASHIMOTO'S THYROIDITIS: ICD-10-CM

## 2022-04-08 DIAGNOSIS — E78.2 MIXED HYPERLIPIDEMIA: Primary | ICD-10-CM

## 2022-04-08 DIAGNOSIS — E78.2 MIXED HYPERLIPIDEMIA: ICD-10-CM

## 2022-04-08 DIAGNOSIS — Z12.11 SCREEN FOR COLON CANCER: ICD-10-CM

## 2022-04-08 DIAGNOSIS — E06.3 HYPOTHYROIDISM DUE TO HASHIMOTO'S THYROIDITIS: ICD-10-CM

## 2022-04-08 DIAGNOSIS — Z13.31 NEGATIVE DEPRESSION SCREENING: ICD-10-CM

## 2022-04-08 DIAGNOSIS — Z72.0 CHEWS TOBACCO: ICD-10-CM

## 2022-04-08 DIAGNOSIS — J45.20 MILD INTERMITTENT ASTHMA WITHOUT COMPLICATION: ICD-10-CM

## 2022-04-08 DIAGNOSIS — F41.1 GENERALIZED ANXIETY DISORDER: ICD-10-CM

## 2022-04-08 DIAGNOSIS — D64.9 ANEMIA, UNSPECIFIED TYPE: ICD-10-CM

## 2022-04-08 LAB
ALBUMIN SERPL BCP-MCNC: 3.8 G/DL (ref 3.5–5)
ALP SERPL-CCNC: 94 U/L (ref 46–116)
ALT SERPL W P-5'-P-CCNC: 32 U/L (ref 12–78)
ANION GAP SERPL CALCULATED.3IONS-SCNC: 5 MMOL/L (ref 4–13)
AST SERPL W P-5'-P-CCNC: 23 U/L (ref 5–45)
BILIRUB SERPL-MCNC: 0.52 MG/DL (ref 0.2–1)
BUN SERPL-MCNC: 24 MG/DL (ref 5–25)
CALCIUM SERPL-MCNC: 9.4 MG/DL (ref 8.3–10.1)
CHLORIDE SERPL-SCNC: 108 MMOL/L (ref 100–108)
CHOLEST SERPL-MCNC: 190 MG/DL
CO2 SERPL-SCNC: 27 MMOL/L (ref 21–32)
CREAT SERPL-MCNC: 1.34 MG/DL (ref 0.6–1.3)
GFR SERPL CREATININE-BSD FRML MDRD: 61 ML/MIN/1.73SQ M
GLUCOSE P FAST SERPL-MCNC: 115 MG/DL (ref 65–99)
HDLC SERPL-MCNC: 42 MG/DL
LDLC SERPL CALC-MCNC: 112 MG/DL (ref 0–100)
POTASSIUM SERPL-SCNC: 4.6 MMOL/L (ref 3.5–5.3)
PROT SERPL-MCNC: 7.4 G/DL (ref 6.4–8.2)
SODIUM SERPL-SCNC: 140 MMOL/L (ref 136–145)
TRIGL SERPL-MCNC: 178 MG/DL
TSH SERPL DL<=0.05 MIU/L-ACNC: 0.73 UIU/ML (ref 0.45–4.5)

## 2022-04-08 PROCEDURE — 80053 COMPREHEN METABOLIC PANEL: CPT

## 2022-04-08 PROCEDURE — 1036F TOBACCO NON-USER: CPT | Performed by: INTERNAL MEDICINE

## 2022-04-08 PROCEDURE — 3008F BODY MASS INDEX DOCD: CPT | Performed by: INTERNAL MEDICINE

## 2022-04-08 PROCEDURE — 84443 ASSAY THYROID STIM HORMONE: CPT

## 2022-04-08 PROCEDURE — 3725F SCREEN DEPRESSION PERFORMED: CPT | Performed by: INTERNAL MEDICINE

## 2022-04-08 PROCEDURE — 99214 OFFICE O/P EST MOD 30 MIN: CPT | Performed by: INTERNAL MEDICINE

## 2022-04-08 PROCEDURE — 36415 COLL VENOUS BLD VENIPUNCTURE: CPT

## 2022-04-08 PROCEDURE — 80061 LIPID PANEL: CPT

## 2022-04-08 RX ORDER — LEVOTHYROXINE SODIUM 0.2 MG/1
200 TABLET ORAL EVERY MORNING
Qty: 30 TABLET | Refills: 5 | Status: SHIPPED | OUTPATIENT
Start: 2022-04-08 | End: 2022-04-08 | Stop reason: SDUPTHER

## 2022-04-08 RX ORDER — LEVOTHYROXINE SODIUM 0.2 MG/1
200 TABLET ORAL EVERY MORNING
Qty: 90 TABLET | Refills: 3 | Status: SHIPPED | OUTPATIENT
Start: 2022-04-08

## 2022-04-08 NOTE — PATIENT INSTRUCTIONS
Obesity   AMBULATORY CARE:   Obesity  means your body mass index (BMI) is greater than 30  Your healthcare provider will use your height and weight to measure your BMI  The risks of obesity include  many health problems, including injuries or physical disability  · Diabetes (high blood sugar level)    · High blood pressure or high cholesterol    · Heart disease    · Stroke    · Gallbladder or liver disease    · Cancer of the colon, breast, prostate, liver, or kidney    · Sleep apnea    · Arthritis or gout    Screening  is done to check for health conditions before you have signs or symptoms  If you are 28to 79years old, your blood sugar level may be checked every 3 years for signs of prediabetes or diabetes  Your healthcare provider will check your blood pressure at each visit  High blood pressure can lead to a stroke or other problems  Your provider may check for signs of heart disease, cancer, or other health problems  Seek care immediately if:   · You have a severe headache, confusion, or difficulty speaking  · You have weakness on one side of your body  · You have chest pain, sweating, or shortness of breath  Call your doctor if:   · You have symptoms of gallbladder or liver disease, such as pain in your upper abdomen  · You have knee or hip pain and discomfort while walking  · You have symptoms of diabetes, such as intense hunger and thirst, and frequent urination  · You have symptoms of sleep apnea, such as snoring or daytime sleepiness  · You have questions or concerns about your condition or care  Treatment for obesity  focuses on helping you lose weight to improve your health  Even a small decrease in BMI can reduce the risk for many health problems  Your healthcare provider will help you set a weight-loss goal   · Lifestyle changes  are the first step in treating obesity  These include making healthy food choices and getting regular physical activity   Your healthcare provider may suggest a weight-loss program that involves coaching, education, and therapy  · Medicine  may help you lose weight when it is used with a healthy foods and physical activity  · Surgery  can help you lose weight if you are very obese and have other health problems  There are several types of weight-loss surgery  Ask your healthcare provider for more information  Tips for safe weight loss:   · Set small, realistic goals  An example of a small goal is to walk for 20 minutes 5 days a week  Anther goal is to lose 5% of your body weight  · Tell friends, family members, and coworkers about your goals  and ask for their support  Ask a friend to lose weight with you, or join a weight-loss support group  · Identify foods or triggers that may cause you to overeat , and find ways to avoid them  Remove tempting high-calorie foods from your home and workplace  Place a bowl of fresh fruit on your kitchen counter  If stress causes you to eat, then find other ways to cope with stress  A counselor or therapist may be able to help you  · Keep a diary to track what you eat and drink  Also write down how many minutes of physical activity you do each day  Weigh yourself once a week and record it in your diary  Eating changes: You will need to eat 500 to 1,000 fewer calories each day than you currently eat to lose 1 to 2 pounds a week  The following changes will help you cut calories:  · Eat smaller portions  Use small plates, no larger than 9 inches in diameter  Fill your plate half full of fruits and vegetables  Measure your food using measuring cups until you know what a serving size looks like  · Eat 3 meals and 1 or 2 snacks each day  Plan your meals in advance  Kailey Pillai and eat at home most of the time  Eat slowly  Do not skip meals  Skipping meals can lead to overeating later in the day  This can make it harder for you to lose weight   Talk with a dietitian to help you make a meal plan and schedule that is right for you  · Eat fruits and vegetables at every meal   They are low in calories and high in fiber, which makes you feel full  Do not add butter, margarine, or cream sauce to vegetables  Use herbs to season steamed vegetables  · Eat less fat and fewer fried foods  Eat more baked or grilled chicken and fish  These protein sources are lower in calories and fat than red meat  Limit fast food  Dress your salads with olive oil and vinegar instead of bottled dressing  · Limit the amount of sugar you eat  Do not drink sugary beverages  Limit alcohol  Activity changes:  Physical activity is good for your body in many ways  It helps you burn calories and build strong muscles  It decreases stress and depression, and improves your mood  It can also help you sleep better  Talk to your healthcare provider before you begin an exercise program   · Exercise for at least 30 minutes 5 days a week  Start slowly  Set aside time each day for physical activity that you enjoy and that is convenient for you  It is best to do both weight training and an activity that increases your heart rate, such as walking, bicycling, or swimming  · Find ways to be more active  Do yard work and housecleaning  Walk up the stairs instead of using elevators  Spend your leisure time going to events that require walking, such as outdoor festivals or fairs  This extra physical activity can help you lose weight and keep it off  Follow up with your doctor as directed: You may need to meet with a dietitian  Write down your questions so you remember to ask them during your visits  © Copyright [employer] 2022 Information is for End User's use only and may not be sold, redistributed or otherwise used for commercial purposes  All illustrations and images included in CareNotes® are the copyrighted property of [employer]  or Wilfred Ivey   The above information is an  only   It is not intended as medical advice for individual conditions or treatments  Talk to your doctor, nurse or pharmacist before following any medical regimen to see if it is safe and effective for you  Low Fat Diet   AMBULATORY CARE:   A low-fat diet  is an eating plan that is low in total fat, unhealthy fat, and cholesterol  You may need to follow a low-fat diet if you have trouble digesting or absorbing fat  You may also need to follow this diet if you have high cholesterol  You can also lower your cholesterol by increasing the amount of fiber in your diet  Soluble fiber is a type of fiber that helps to decrease cholesterol levels  Different types of fat in food:   · Limit unhealthy fats  A diet that is high in cholesterol, saturated fat, and trans fat may cause unhealthy cholesterol levels  Unhealthy cholesterol levels increase your risk of heart disease  ? Cholesterol:  Limit intake of cholesterol to less than 200 mg per day  Cholesterol is found in meat, eggs, and dairy  ? Saturated fat:  Limit saturated fat to less than 7% of your total daily calories  Ask your dietitian how many calories you need each day  Saturated fat is found in butter, cheese, ice cream, whole milk, and palm oil  Saturated fat is also found in meat, such as beef, pork, chicken skin, and processed meats  Processed meats include sausage, hot dogs, and bologna  ? Trans fat:  Avoid trans fat as much as possible  Trans fat is used in fried and baked foods  Foods that say trans fat free on the label may still have up to 0 5 grams of trans fat per serving  · Include healthy fats  Replace foods that are high in saturated and trans fat with foods high in healthy fats  This may help to decrease high cholesterol levels  ? Monounsaturated fats: These are found in avocados, nuts, and vegetable oils, such as olive, canola, and sunflower oil  ? Polyunsaturated fats: These can be found in vegetable oils, such as soybean or corn oil   Omega-3 fats can help to decrease the risk of heart disease  Omega-3 fats are found in fish, such as salmon, herring, trout, and tuna  Omega-3 fats can also be found in plant foods, such as walnuts, flaxseed, soybeans, and canola oil  Foods to limit or avoid:   · Grains:      ? Snacks that are made with partially hydrogenated oils, such as chips, regular crackers, and butter-flavored popcorn    ? High-fat baked goods, such as biscuits, croissants, doughnuts, pies, cookies, and pastries    · Dairy:      ? Whole milk, 2% milk, and yogurt and ice cream made with whole milk    ? Half and half creamer, heavy cream, and whipping cream    ? Cheese, cream cheese, and sour cream    · Meats and proteins:      ? High-fat cuts of meat (T-bone steak, regular hamburger, and ribs)    ? Fried meat, poultry (turkey and chicken), and fish    ? Poultry (chicken and turkey) with skin    ? Cold cuts (salami or bologna), hot dogs, velasco, and sausage    ? Whole eggs and egg yolks    · Vegetables and fruits with added fat:      ? Fried vegetables or vegetables in butter or high-fat sauces, such as cream or cheese sauces    ? Fried fruit or fruit served with butter or cream    · Fats:      ? Butter, stick margarine, and shortening    ? Coconut, palm oil, and palm kernel oil    Foods to include:   · Grains:      ? Whole-grain breads, cereals, pasta, and brown rice    ? Low-fat crackers and pretzels    · Vegetables and fruits:      ? Fresh, frozen, or canned vegetables (no salt or low-sodium)    ? Fresh, frozen, dried, or canned fruit (canned in light syrup or fruit juice)    ? Avocado    · Low-fat dairy products:      ? Nonfat (skim) or 1% milk    ? Nonfat or low-fat cheese, yogurt, and cottage cheese    · Meats and proteins:      ? Chicken or turkey with no skin    ? Baked or broiled fish    ? Lean beef and pork (loin, round, extra lean hamburger)    ? Beans and peas, unsalted nuts, soy products    ? Egg whites and substitutes    ?  Seeds and nuts    · Fats:      ? Unsaturated oil, such as canola, olive, peanut, soybean, or sunflower oil    ? Soft or liquid margarine and vegetable oil spread    ? Low-fat salad dressing    Other ways to decrease fat:   · Read food labels before you buy foods  Choose foods that have less than 30% of calories from fat  Choose low-fat or fat-free dairy products  Remember that fat free does not mean calorie free  These foods still contain calories, and too many calories can lead to weight gain  · Trim fat from meat and avoid fried food  Trim all visible fat from meat before you cook it  Remove the skin from poultry  Do not [place] meat, fish, or poultry  Bake, roast, boil, or broil these foods instead  Avoid fried foods  Eat a baked potato instead of Western Sindhu fries  Steam vegetables instead of sautéing them in butter  · Add less fat to foods  Use imitation velasco bits on salads and baked potatoes instead of regular velasco bits  Use fat-free or low-fat salad dressings instead of regular dressings  Use low-fat or nonfat butter-flavored topping instead of regular butter or margarine on popcorn and other foods  Ways to decrease fat in recipes:  Replace high-fat ingredients with low-fat or nonfat ones  This may cause baked goods to be drier than usual  You may need to use nonfat cooking spray on pans to prevent food from sticking  You also may need to change the amount of other ingredients, such as water, in the recipe  Try the following:  · Use low-fat or light margarine instead of regular margarine or shortening  · Use lean ground turkey breast or chicken, or lean ground beef (less than 5% fat) instead of hamburger  · Add 1 teaspoon of canola oil to 8 ounces of skim milk instead of using cream or half and half  · Use grated zucchini, carrots, or apples in breads instead of coconut  · Use blenderized, low-fat cottage cheese, plain tofu, or low-fat ricotta cheese instead of cream cheese       · Use 1 egg white and 1 teaspoon of canola oil, or use ¼ cup (2 ounces) of fat-free egg substitute instead of a whole egg  · Replace half of the oil that is called for in a recipe with applesauce when you bake  Use 3 tablespoons of cocoa powder and 1 tablespoon of canola oil instead of a square of baking chocolate  How to increase fiber:  Eat enough high-fiber foods to get 20 to 30 grams of fiber every day  Slowly increase your fiber intake to avoid stomach cramps, gas, and other problems  · Eat 3 ounces of whole-grain foods each day  An ounce is about 1 slice of bread  Eat whole-grain breads, such as whole-wheat bread  Whole wheat, whole-wheat flour, or other whole grains should be listed as the first ingredient on the food label  Replace white flour with whole-grain flour or use half of each in recipes  Whole-grain flour is heavier than white flour, so you may have to add more yeast or baking powder  · Eat a high-fiber cereal for breakfast   Oatmeal is a good source of soluble fiber  Look for cereals that have bran or fiber in the name  Choose whole-grain products, such as brown rice, barley, and whole-wheat pasta  · Eat more beans, peas, and lentils  For example, add beans to soups or salads  Eat at least 5 cups of fruits and vegetables each day  Eat fruits and vegetables with the peel because the peel is high in fiber  © Copyright [employer] 2022 Information is for End User's use only and may not be sold, redistributed or otherwise used for commercial purposes  All illustrations and images included in CareNotes® are the copyrighted property of [employer] M [employer]  or [address] [place], [place] [address]woody priti   The above information is an  only  It is not intended as medical advice for individual conditions or treatments  Talk to your doctor, nurse or pharmacist before following any medical regimen to see if it is safe and effective for you      Heart Healthy Diet   AMBULATORY CARE:   A heart healthy diet  is an eating plan low in unhealthy fats and sodium (salt)  The plan is high in healthy fats and fiber  A heart healthy diet helps improve your cholesterol levels and lowers your risk for heart disease and stroke  A dietitian will teach you how to read and understand food labels  Heart healthy diet guidelines to follow:   · Choose foods that contain healthy fats  ? Unsaturated fats  include monounsaturated and polyunsaturated fats  Unsaturated fat is found in foods such as soybean, canola, olive, corn, and safflower oils  It is also found in soft tub margarine that is made with liquid vegetable oil  ? Omega-3 fat  is found in certain fish, such as salmon, tuna, and trout, and in walnuts and flaxseed  Eat fish high in omega-3 fats at least 2 times a week  · Get 20 to 30 grams of fiber each day  Fruits, vegetables, whole-grain foods, and legumes (cooked beans) are good sources of fiber  · Limit or do not have unhealthy fats  ? Cholesterol  is found in animal foods, such as eggs and lobster, and in dairy products made from whole milk  Limit cholesterol to less than 200 mg each day  ? Saturated fat  is found in meats, such as velasco and hamburger  It is also found in chicken or turkey skin, whole milk, and butter  Limit saturated fat to less than 7% of your total daily calories  ? Trans fat  is found in packaged foods, such as potato chips and cookies  It is also in hard margarine, some fried foods, and shortening  Do not eat foods that contain trans fats  · Limit sodium as directed  You may be told to limit sodium to 2,000 to 2,300 mg each day  Choose low-sodium or no-salt-added foods  Add little or no salt to food you prepare  Use herbs and spices in place of salt  Include the following in your heart healthy plan:  Ask your dietitian or healthcare provider how many servings to have from each of the following food groups:  · Grains:      ?  Whole-wheat breads, cereals, and pastas, and Sumanth Murillo rice    ? Low-fat, low-sodium crackers and chips    · Vegetables:      ? Broccoli, green beans, green peas, and spinach    ? Collards, kale, and lima beans    ? Carrots, sweet potatoes, tomatoes, and peppers    ? Canned vegetables with no salt added    · Fruits:      ? Bananas, peaches, pears, and pineapple    ? Grapes, raisins, and dates    ? Oranges, tangerines, grapefruit, orange juice, and grapefruit juice    ? Apricots, mangoes, melons, and papaya    ? Raspberries and strawberries    ? Canned fruit with no added sugar    · Low-fat dairy:      ? Nonfat (skim) milk, 1% milk, and low-fat almond, cashew, or soy milks fortified with calcium    ? Low-fat cheese, regular or frozen yogurt, and cottage cheese    · Meats and proteins:      ? Lean cuts of beef and pork (loin, leg, round), skinless chicken and turkey    ? Legumes, soy products, egg whites, or nuts    Limit or do not include the following in your heart healthy plan:   · Unhealthy fats and oils:      ? Whole or 2% milk, cream cheese, sour cream, or cheese    ? High-fat cuts of beef (T-bone steaks, ribs), chicken or turkey with skin, and organ meats such as liver    ? Butter, stick margarine, shortening, and cooking oils such as coconut or palm oil    · Foods and liquids high in sodium:      ? Packaged foods, such as frozen dinners, cookies, macaroni and cheese, and cereals with more than 300 mg of sodium per serving    ? Vegetables with added sodium, such as instant potatoes, vegetables with added sauces, or regular canned vegetables    ? Cured or smoked meats, such as hot dogs, velasco, and sausage    ? High-sodium ketchup, barbecue sauce, salad dressing, pickles, olives, soy sauce, or miso    · Foods and liquids high in sugar:      ? Candy, cake, cookies, pies, or doughnuts    ? Soft drinks (soda), sports drinks, or sweetened tea    ? Canned or dry mixes for cakes, soups, sauces, or gravies    Other healthy heart guidelines:   · Do not smoke    Nicotine and other chemicals in cigarettes and cigars can cause lung and heart damage  Ask your healthcare provider for information if you currently smoke and need help to quit  E-cigarettes or smokeless tobacco still contain nicotine  Talk to your healthcare provider before you use these products  · Limit or do not drink alcohol as directed  Alcohol can damage your heart and raise your blood pressure  Your healthcare provider may give you specific daily and weekly limits  The general recommended limit is 1 drink a day for women 21 or older and for men 72 or older  Do not have more than 3 drinks in a day or 7 in a week  The recommended limit is 2 drinks a day for men 24to 59years of age  Do not have more than 4 drinks in a day or 14 in a week  A drink of alcohol is 12 ounces of beer, 5 ounces of wine, or 1½ ounces of liquor  · Exercise regularly  Exercise can help you maintain a healthy weight and improve your blood pressure and cholesterol levels  Regular exercise can also decrease your risk for heart problems  Ask your healthcare provider about the best exercise plan for you  Do not start an exercise program without asking your healthcare provider  Follow up with your doctor or cardiologist as directed:  Write down your questions so you remember to ask them during your visits  © Copyright [employer] 2022 Information is for End User's use only and may not be sold, redistributed or otherwise used for commercial purposes  All illustrations and images included in CareNotes® are the copyrighted property of [employer]  or Wilfred Ivey   The above information is an  only  It is not intended as medical advice for individual conditions or treatments  Talk to your doctor, nurse or pharmacist before following any medical regimen to see if it is safe and effective for you

## 2022-04-08 NOTE — PROGRESS NOTES
BMI Counseling: There is no height or weight on file to calculate BMI  The BMI is above normal  Nutrition recommendations include decreasing portion sizes and encouraging healthy choices of fruits and vegetables  Exercise recommendations include moderate physical activity 150 minutes/week  No pharmacotherapy was ordered  Rationale for BMI follow-up plan is due to patient being overweight or obese  Assessment/Plan:  Problem List Items Addressed This Visit        Endocrine    Hypothyroidism    Relevant Medications    levothyroxine 200 mcg tablet    Other Relevant Orders    TSH, 3rd generation       Respiratory    Asthma       Other    Hyperlipidemia - Primary    Relevant Orders    Comprehensive metabolic panel    Lipid Panel with Direct LDL reflex    Generalized anxiety disorder    Chews tobacco    Anemia      Other Visit Diagnoses     Screen for colon cancer        Relevant Orders    Ambulatory Referral to Gastroenterology    Negative depression screening               Diagnoses and all orders for this visit:    Mixed hyperlipidemia  -     Comprehensive metabolic panel; Future  -     Lipid Panel with Direct LDL reflex; Future    Hypothyroidism due to Hashimoto's thyroiditis  -     TSH, 3rd generation; Future  -     levothyroxine 200 mcg tablet; Take 1 tablet (200 mcg total) by mouth every morning    Mild intermittent asthma without complication    Generalized anxiety disorder    Anemia, unspecified type    Chews tobacco    Screen for colon cancer  -     Ambulatory Referral to Gastroenterology; Future    Negative depression screening        No problem-specific Assessment & Plan notes found for this encounter  A/P: Doing well and will check labs  Discussed BMI and will give info on diet and exercise  Order CRC  Continue current treatment and RTC six months for routine  Subjective:      Patient ID: Destiny Becerra is a 49 y o  male      WM RTC for f/u hyperlipidemia, hypothyroidism, etc  Doing well and no new issues  Remains active w/o difficulty and no falls  Asthma is controlled with limited VALERIA use  AZALEA and chronic pain are manageable  Still chews tobacco  Due for labs and CRC  The following portions of the patient's history were reviewed and updated as appropriate:   He has a past medical history of Anemia, Asthma, Chronic kidney disease, Disease of thyroid gland, Generalized anxiety disorder, Kidney stone, and Lyme disease  ,  does not have any pertinent problems on file  ,   has a past surgical history that includes Knee arthroscopy  ,  family history includes No Known Problems in his father and mother  ,   reports that he has never smoked  His smokeless tobacco use includes chew  He reports current alcohol use  He reports that he does not use drugs  ,  has No Known Allergies     Current Outpatient Medications   Medication Sig Dispense Refill    albuterol (Proventil HFA) 90 mcg/act inhaler Inhale 2 puffs every 6 (six) hours as needed for wheezing or shortness of breath 6 7 g 0    clotrimazole-betamethasone (LOTRISONE) 1-0 05 % cream Apply topically 2 (two) times a day 45 g 0    fluticasone (FLONASE) 50 mcg/act nasal spray 1 spray into each nostril daily 16 g 0    levothyroxine 200 mcg tablet Take 1 tablet (200 mcg total) by mouth every morning 30 tablet 5    meloxicam (MOBIC) 15 mg tablet Take 1 tablet (15 mg total) by mouth daily 90 tablet 3     No current facility-administered medications for this visit  Review of Systems   Constitutional: Negative for activity change, chills, diaphoresis, fatigue and fever  HENT: Negative  Eyes: Negative for visual disturbance  Respiratory: Negative for cough, chest tightness, shortness of breath and wheezing  Cardiovascular: Negative for chest pain, palpitations and leg swelling  Gastrointestinal: Negative for abdominal pain, constipation, diarrhea, nausea and vomiting  Endocrine: Negative for cold intolerance and heat intolerance  Genitourinary: Negative for difficulty urinating, dysuria and frequency  Musculoskeletal: Negative for arthralgias, gait problem and myalgias  Neurological: Negative for dizziness, seizures, syncope, weakness, light-headedness and headaches  Psychiatric/Behavioral: Negative for confusion, dysphoric mood and sleep disturbance  The patient is not nervous/anxious  PHQ-2/9 Depression Screening    Little interest or pleasure in doing things: 0 - not at all  Feeling down, depressed, or hopeless: 0 - not at all  PHQ-2 Score: 0  PHQ-2 Interpretation: Negative depression screen        Objective:  Vitals:    04/08/22 0744   BP: 134/80   BP Location: Left arm   Patient Position: Sitting   Cuff Size: Standard   Pulse: 66   Resp: 14   Temp: (!) 96 7 °F (35 9 °C)   SpO2: 98%   Weight: 110 kg (243 lb)   Height: 5' 10" (1 778 m)     Body mass index is 34 87 kg/m²  Physical Exam  Vitals reviewed  Exam conducted with a chaperone present  Constitutional:       General: He is not in acute distress  Appearance: Normal appearance  He is not ill-appearing  HENT:      Head: Normocephalic and atraumatic  Mouth/Throat:      Mouth: Mucous membranes are moist    Eyes:      Extraocular Movements: Extraocular movements intact  Conjunctiva/sclera: Conjunctivae normal       Pupils: Pupils are equal, round, and reactive to light  Neck:      Vascular: No carotid bruit  Cardiovascular:      Rate and Rhythm: Normal rate and regular rhythm  Heart sounds: Normal heart sounds  Pulmonary:      Effort: Pulmonary effort is normal  No respiratory distress  Breath sounds: Normal breath sounds  No wheezing or rales  Abdominal:      General: Bowel sounds are normal  There is no distension  Palpations: Abdomen is soft  Tenderness: There is no abdominal tenderness  Musculoskeletal:      Cervical back: Neck supple  Right lower leg: No edema  Left lower leg: No edema     Neurological: General: No focal deficit present  Mental Status: He is alert and oriented to person, place, and time  Mental status is at baseline  Psychiatric:         Mood and Affect: Mood normal          Behavior: Behavior normal          Thought Content: Thought content normal          Judgment: Judgment normal          BMI Counseling: Body mass index is 34 87 kg/m²  The BMI is above normal  Nutrition recommendations include reducing portion sizes, decreasing overall calorie intake, reducing intake of saturated fat and trans fat and reducing intake of cholesterol  Exercise recommendations include moderate aerobic physical activity for 150 minutes/week

## 2022-09-01 ENCOUNTER — HOSPITAL ENCOUNTER (OUTPATIENT)
Dept: MRI IMAGING | Facility: HOSPITAL | Age: 52
Discharge: HOME | End: 2022-09-01
Payer: COMMERCIAL

## 2022-09-01 DIAGNOSIS — M79.671 BILATERAL FOOT PAIN: ICD-10-CM

## 2022-09-01 DIAGNOSIS — M79.672 BILATERAL FOOT PAIN: ICD-10-CM

## 2022-09-01 PROCEDURE — G1004 CDSM NDSC: HCPCS

## 2022-09-01 PROCEDURE — 73721 MRI JNT OF LWR EXTRE W/O DYE: CPT

## 2022-10-14 ENCOUNTER — OFFICE VISIT (OUTPATIENT)
Dept: INTERNAL MEDICINE CLINIC | Facility: CLINIC | Age: 52
End: 2022-10-14
Payer: COMMERCIAL

## 2022-10-14 VITALS
HEART RATE: 69 BPM | SYSTOLIC BLOOD PRESSURE: 138 MMHG | DIASTOLIC BLOOD PRESSURE: 70 MMHG | HEIGHT: 72 IN | BODY MASS INDEX: 32.78 KG/M2 | OXYGEN SATURATION: 99 % | TEMPERATURE: 97.6 F | WEIGHT: 242 LBS

## 2022-10-14 DIAGNOSIS — E03.8 HYPOTHYROIDISM DUE TO HASHIMOTO'S THYROIDITIS: ICD-10-CM

## 2022-10-14 DIAGNOSIS — Z23 ENCOUNTER FOR VACCINATION: ICD-10-CM

## 2022-10-14 DIAGNOSIS — Z12.11 SCREEN FOR COLON CANCER: ICD-10-CM

## 2022-10-14 DIAGNOSIS — Z72.0 CHEWS TOBACCO: ICD-10-CM

## 2022-10-14 DIAGNOSIS — Z13.29 SCREENING FOR THYROID DISORDER: ICD-10-CM

## 2022-10-14 DIAGNOSIS — Z13.220 SCREENING FOR LIPID DISORDERS: ICD-10-CM

## 2022-10-14 DIAGNOSIS — E06.3 HYPOTHYROIDISM DUE TO HASHIMOTO'S THYROIDITIS: ICD-10-CM

## 2022-10-14 DIAGNOSIS — F41.1 GENERALIZED ANXIETY DISORDER: ICD-10-CM

## 2022-10-14 DIAGNOSIS — D64.9 ANEMIA, UNSPECIFIED TYPE: ICD-10-CM

## 2022-10-14 DIAGNOSIS — Z13.0 SCREENING FOR DEFICIENCY ANEMIA: ICD-10-CM

## 2022-10-14 DIAGNOSIS — Z00.00 WELL ADULT EXAM: Primary | ICD-10-CM

## 2022-10-14 DIAGNOSIS — Z13.1 SCREENING FOR DIABETES MELLITUS (DM): ICD-10-CM

## 2022-10-14 DIAGNOSIS — Z12.11 SCREENING FOR COLON CANCER: ICD-10-CM

## 2022-10-14 DIAGNOSIS — E78.2 MIXED HYPERLIPIDEMIA: ICD-10-CM

## 2022-10-14 DIAGNOSIS — J45.20 MILD INTERMITTENT ASTHMA WITHOUT COMPLICATION: ICD-10-CM

## 2022-10-14 DIAGNOSIS — Z12.5 SCREENING FOR PROSTATE CANCER: ICD-10-CM

## 2022-10-14 PROCEDURE — 99396 PREV VISIT EST AGE 40-64: CPT | Performed by: INTERNAL MEDICINE

## 2022-10-14 NOTE — PROGRESS NOTES
Tobacco Cessation Counseling: Tobacco cessation counseling was provided  The patient is sincerely urged to quit consumption of tobacco  He is ready to quit tobacco  Medication options and side effects of medication not discussed  Patient refused medication  Pt chews doesn't smoke  Assessment/Plan:  Problem List Items Addressed This Visit        Endocrine    Hypothyroidism    Relevant Orders    TSH, 3rd generation       Respiratory    Asthma       Other    Hyperlipidemia    Relevant Orders    Comprehensive metabolic panel    LDL cholesterol, direct    Triglycerides    Generalized anxiety disorder    Chews tobacco    Anemia    Relevant Orders    CBC and differential      Other Visit Diagnoses     Well adult exam    -  Primary    Relevant Orders    CBC and differential    Comprehensive metabolic panel    PSA, Total Screen    Hemoglobin A1C    TSH, 3rd generation    LDL cholesterol, direct    Triglycerides    Screening for prostate cancer        Relevant Orders    PSA, Total Screen    Screening for lipid disorders        Screening for diabetes mellitus (DM)        Relevant Orders    Hemoglobin A1C    Screening for thyroid disorder        Screening for deficiency anemia        Encounter for vaccination        Screen for colon cancer        Screening for colon cancer        Relevant Orders    Cologuard           Diagnoses and all orders for this visit:    Well adult exam  -     CBC and differential; Future  -     Comprehensive metabolic panel; Future  -     PSA, Total Screen; Future  -     Hemoglobin A1C; Future  -     TSH, 3rd generation; Future  -     LDL cholesterol, direct; Future  -     Triglycerides; Future    Mild intermittent asthma without complication    Hypothyroidism due to Hashimoto's thyroiditis  -     TSH, 3rd generation; Future    Mixed hyperlipidemia  -     Comprehensive metabolic panel; Future  -     LDL cholesterol, direct; Future  -     Triglycerides;  Future    Anemia, unspecified type  -     CBC and differential; Future    Generalized anxiety disorder    Chews tobacco    Screening for prostate cancer  -     PSA, Total Screen; Future    Screening for lipid disorders    Screening for diabetes mellitus (DM)  -     Hemoglobin A1C; Future    Screening for thyroid disorder    Screening for deficiency anemia    Encounter for vaccination    Screen for colon cancer    Screening for colon cancer  -     Cologuard      No problem-specific Assessment & Plan notes found for this encounter  A/P: Doing well and co-morbidities are stable  Labs will be ordered  Order CRC  Discussed vaccines already had his flu vaccine  Stop chewing tobacco  No mental or physical restrictions  No evidence of communicable diseases  Continue current treatment and RTC one year for annual and six months for routine           Subjective:      Patient ID: Cherise Felder is a 47 y o  male  WM presents for a well exam  Doing ok and no c/o's  RAD is well controlled and limited rescue MDI use  Still chews  Chronic pain is controlled  Remains active w/o difficulty and no falls  Denies depression/AZALEA  No suicidal or violent ideations  Working full time as a teacher  No recent travel  No fever, chills, or sweats  No unexplained wt change  Denies CP, SOB, palpitations, edema, orthopnea, or PND  No sz or syncope  No changes in bowel or bladder habits  No trouble swallowing  Appetite and sleep are good  Sees both  a DDS and an eye doctor  No change in PMH, PSH, ALL, OH, SH, or Fhx  Currently due for labs, vaccines, and CRC   The following portions of the patient's history were reviewed and updated as appropriate:   He has a past medical history of Anemia, Asthma, Chronic kidney disease, Disease of thyroid gland, Generalized anxiety disorder, Kidney stone, and Lyme disease  ,  does not have any pertinent problems on file  ,   has a past surgical history that includes Knee arthroscopy  ,  family history includes No Known Problems in his father and mother  ,   reports that he has never smoked  His smokeless tobacco use includes chew  He reports current alcohol use  He reports that he does not use drugs  ,  has No Known Allergies     Current Outpatient Medications   Medication Sig Dispense Refill   • albuterol (Proventil HFA) 90 mcg/act inhaler Inhale 2 puffs every 6 (six) hours as needed for wheezing or shortness of breath 6 7 g 0   • clotrimazole-betamethasone (LOTRISONE) 1-0 05 % cream Apply topically 2 (two) times a day 45 g 0   • fluticasone (FLONASE) 50 mcg/act nasal spray 1 spray into each nostril daily 16 g 0   • levothyroxine 200 mcg tablet Take 1 tablet (200 mcg total) by mouth every morning 90 tablet 3   • meloxicam (MOBIC) 15 mg tablet Take 1 tablet (15 mg total) by mouth daily 90 tablet 3     No current facility-administered medications for this visit  Review of Systems   Constitutional: Negative for activity change, chills, diaphoresis, fatigue and fever  HENT: Negative  Eyes: Negative for visual disturbance  Respiratory: Negative for cough, chest tightness, shortness of breath and wheezing  Cardiovascular: Negative for chest pain, palpitations and leg swelling  Gastrointestinal: Negative for abdominal pain, constipation, diarrhea, nausea and vomiting  Endocrine: Negative for cold intolerance and heat intolerance  Genitourinary: Negative for difficulty urinating, dysuria and frequency  Musculoskeletal: Negative for arthralgias, gait problem and myalgias  Neurological: Negative for dizziness, seizures, syncope, weakness, light-headedness and headaches  Psychiatric/Behavioral: Negative for confusion, dysphoric mood and sleep disturbance  The patient is not nervous/anxious          PHQ-2/9 Depression Screening          Objective:  Vitals:    10/14/22 0716   BP: 138/70   BP Location: Left arm   Patient Position: Sitting   Pulse: 69   Temp: 97 6 °F (36 4 °C)   SpO2: 99%   Weight: 110 kg (242 lb)   Height: 6' (1 829 m) Body mass index is 32 82 kg/m²  Physical Exam  Vitals and nursing note reviewed  Constitutional:       General: He is not in acute distress  Appearance: Normal appearance  He is not ill-appearing  HENT:      Head: Normocephalic and atraumatic  Mouth/Throat:      Mouth: Mucous membranes are moist    Eyes:      Extraocular Movements: Extraocular movements intact  Conjunctiva/sclera: Conjunctivae normal       Pupils: Pupils are equal, round, and reactive to light  Neck:      Vascular: No carotid bruit  Cardiovascular:      Rate and Rhythm: Normal rate and regular rhythm  Heart sounds: Normal heart sounds  Pulmonary:      Effort: Pulmonary effort is normal  No respiratory distress  Breath sounds: Normal breath sounds  No wheezing, rhonchi or rales  Abdominal:      General: Bowel sounds are normal  There is no distension  Palpations: Abdomen is soft  Tenderness: There is no abdominal tenderness  Musculoskeletal:      Cervical back: Neck supple  Right lower leg: No edema  Left lower leg: No edema  Neurological:      General: No focal deficit present  Mental Status: He is alert and oriented to person, place, and time  Mental status is at baseline  Psychiatric:         Mood and Affect: Mood normal          Behavior: Behavior normal          Thought Content:  Thought content normal          Judgment: Judgment normal

## 2022-10-14 NOTE — PATIENT INSTRUCTIONS
Hypothyroidism   WHAT YOU NEED TO KNOW:   What is hypothyroidism? Hypothyroidism is a condition that develops when the thyroid gland does not make enough thyroid hormone  Thyroid hormones help control body temperature, heart rate, growth, and weight  What causes or increases my risk for hypothyroidism? A family history of hypothyroidism    Age 61 years or older or being female    Autoimmune disease, such as inflammation of your thyroid, or Hashimoto disease    Thyroid surgery, head or neck radiation therapy, or medicines such as lithium, sedatives, or narcotics    Thyroid cancer, a goiter, or a viral infection    Low iodine level    Down syndrome or Dozier syndrome    What are the signs and symptoms of hypothyroidism? The signs and symptoms may develop slowly, sometimes over several years  Exhaustion, depression, or irritability    Sensitivity to cold    Muscle aches, headaches, weakness, or trouble concentrating    Dry, flaky skin, brittle nails, or thinning hair    Recent weight gain without overeating, or constipation    Heavy or irregular monthly periods    Puffiness around your eyes or swelling in your hands and feet    Low heart rate, changes in your blood pressure    How is hypothyroidism diagnosed? Your healthcare provider will ask about your symptoms and what medicines you take  He or she will ask about your medical history and if anyone in your family has hypothyroidism  A blood test will show your thyroid hormone level  How is hypothyroidism treated? Thyroid hormone replacement medicine may bring your thyroid hormone level back to normal  You will need to take this medicine for the rest of your life to control hypothyroidism  It is important to take the medicine every day as directed  You will be given instructions for what to do if you miss a dose  Ask your healthcare provider for more information on other medicines you may need  How can I manage hypothyroidism? Get more iodine    The thyroid gland uses iodine to work correctly and to make thyroid hormones  Your healthcare provider may tell you to eat foods that are rich in iodine  He or she will tell you how much of these foods to eat  Milk and seafood are good sources of iodine  You may also need iodine supplements  Keep track of your blood pressure and weight:      Check your blood pressure  and write it down as often as directed  It is important to measure your blood pressure on the same arm and in the same position each time  Keep track of your blood pressure readings, along with the date and time you took them  Take this record with you to your followup visits  Weigh yourself daily  before breakfast after you urinate  Weight gain may be a sign of extra fluid in your body  Keep track of your daily weights and take the record with you to your followup visits  Call your local emergency number [phone] in the [facility] or have someone call if:   You have sudden chest pain or shortness of breath  You have a seizure  You feel like you are going to faint  When should I seek immediate care? You have diarrhea, tremors, or trouble sleeping  Your legs, ankles, or feet are swollen  When should I call my doctor? You have a fever  You have chills, a cough, or feel weak and achy  You have pain and swelling in your muscles and joints  Your skin is itchy, swollen, or you have a rash  Your signs and symptoms return or get worse, even after treatment  You have questions or concerns about your condition or care  CARE AGREEMENT:   You have the right to help plan your care  Learn about your health condition and how it may be treated  Discuss treatment options with your healthcare providers to decide what care you want to receive  You always have the right to refuse treatment  The above information is an  only  It is not intended as medical advice for individual conditions or treatments   Talk to your doctor, nurse or pharmacist before following any medical regimen to see if it is safe and effective for you  © Copyright [facility] 2022 Information is for End User's use only and may not be sold, redistributed or otherwise used for commercial purposes   All illustrations and images included in CareNotes® are the copyrighted property of [employer]  or [address] [place] [address]

## 2022-10-19 ENCOUNTER — OFFICE VISIT (OUTPATIENT)
Dept: URGENT CARE | Facility: CLINIC | Age: 52
End: 2022-10-19
Payer: COMMERCIAL

## 2022-10-19 ENCOUNTER — TELEPHONE (OUTPATIENT)
Dept: INTERNAL MEDICINE CLINIC | Facility: CLINIC | Age: 52
End: 2022-10-19

## 2022-10-19 ENCOUNTER — APPOINTMENT (OUTPATIENT)
Dept: RADIOLOGY | Facility: CLINIC | Age: 52
End: 2022-10-19
Payer: COMMERCIAL

## 2022-10-19 VITALS
HEART RATE: 85 BPM | WEIGHT: 242 LBS | RESPIRATION RATE: 20 BRPM | SYSTOLIC BLOOD PRESSURE: 120 MMHG | OXYGEN SATURATION: 96 % | BODY MASS INDEX: 32.82 KG/M2 | DIASTOLIC BLOOD PRESSURE: 76 MMHG | TEMPERATURE: 98.6 F

## 2022-10-19 DIAGNOSIS — Z87.09 HISTORY OF ASTHMA: ICD-10-CM

## 2022-10-19 DIAGNOSIS — U07.1 COVID-19: ICD-10-CM

## 2022-10-19 DIAGNOSIS — U07.1 COVID-19: Primary | ICD-10-CM

## 2022-10-19 DIAGNOSIS — R05.1 ACUTE COUGH: ICD-10-CM

## 2022-10-19 LAB
SARS-COV-2 AG UPPER RESP QL IA: POSITIVE
VALID CONTROL: ABNORMAL

## 2022-10-19 PROCEDURE — 71046 X-RAY EXAM CHEST 2 VIEWS: CPT

## 2022-10-19 PROCEDURE — 87811 SARS-COV-2 COVID19 W/OPTIC: CPT | Performed by: NURSE PRACTITIONER

## 2022-10-19 PROCEDURE — 99213 OFFICE O/P EST LOW 20 MIN: CPT | Performed by: NURSE PRACTITIONER

## 2022-10-19 RX ORDER — ALBUTEROL SULFATE 90 UG/1
2 AEROSOL, METERED RESPIRATORY (INHALATION) EVERY 6 HOURS PRN
Qty: 8.5 G | Refills: 0 | Status: SHIPPED | OUTPATIENT
Start: 2022-10-19

## 2022-10-19 NOTE — PROGRESS NOTES
[id number]        NAME: Jorden Mazariegos is a 47 y o  male  : 1970    MRN: [id number]  DATE: 2022  TIME: 11:39 AM    Assessment and Plan   COVID-19 [U07 1]  1  COVID-19  XR chest pa & lateral    albuterol (ProAir HFA) 90 mcg/act inhaler   2  Acute cough  Poct Covid 19 Rapid Antigen Test    XR chest pa & lateral    albuterol (ProAir HFA) 90 mcg/act inhaler   3  History of asthma           Patient Instructions       Follow up with PCP in 3-5 days  Proceed to  ER if symptoms worsen  Your covid is +  You are to call your PCP Dr Gordon Chu for discussion of Paxlovid treatment if you are interested - you are to call today  You are NOT to take any dayquil or nyquil - cough suppressants  You are to take plain robitussin for cough  Take vitamin C and D3  You are to take tylenol or motrin as needed for fever or pain  You are to follow up with your PCP as instructed  You are to go to the ED if symptoms worsen - fever, vomiting, shortness of breath or chest pain   Speak with your employer regarding return to work status  You are to use your rescue in haler as needed - go to a separate room in the house and use that room for your own while ill  Do the inhaler only in that room  Wear a mask at home  You are to mask for at least 10 days and as long as you have symptoms  Chief Complaint     Chief Complaint   Patient presents with   • Cold Like Symptoms   • Cough     Flu like symptoms x 3 days          History of Present Illness       This is a 47year old male who is a  and states that on Monday night he developed cold like symptoms with cough, temp of 102, chest tightness  He denies n/v/d  He states that he has been taking nyquil w/o relief  He states he is asthmatic and has not used an inhaler for years  Pt had 2 moderna vaccines and flu was recent  Review of Systems   Review of Systems   Constitutional: Positive for chills, fatigue and fever  HENT: Positive for congestion and sore throat  Eyes: Negative  Respiratory: Positive for cough and chest tightness  Cardiovascular: Negative  Gastrointestinal: Negative  Endocrine: Negative  Genitourinary: Negative  Musculoskeletal: Positive for myalgias  Skin: Negative  Allergic/Immunologic: Negative  Neurological: Negative  Hematological: Negative  Psychiatric/Behavioral: Negative            Current Medications       Current Outpatient Medications:   •  albuterol (ProAir HFA) 90 mcg/act inhaler, Inhale 2 puffs every 6 (six) hours as needed for wheezing, Disp: 8 5 g, Rfl: 0  •  levothyroxine 200 mcg tablet, Take 1 tablet (200 mcg total) by mouth every morning, Disp: 90 tablet, Rfl: 3  •  albuterol (Proventil HFA) 90 mcg/act inhaler, Inhale 2 puffs every 6 (six) hours as needed for wheezing or shortness of breath (Patient not taking: Reported on 10/19/2022), Disp: 6 7 g, Rfl: 0  •  clotrimazole-betamethasone (LOTRISONE) 1-0 05 % cream, Apply topically 2 (two) times a day (Patient not taking: Reported on 10/19/2022), Disp: 45 g, Rfl: 0  •  fluticasone (FLONASE) 50 mcg/act nasal spray, 1 spray into each nostril daily (Patient not taking: Reported on 10/19/2022), Disp: 16 g, Rfl: 0  •  meloxicam (MOBIC) 15 mg tablet, Take 1 tablet (15 mg total) by mouth daily (Patient not taking: Reported on 10/19/2022), Disp: 90 tablet, Rfl: 3    Current Allergies     Allergies as of 10/19/2022   • (No Known Allergies)            The following portions of the patient's history were reviewed and updated as appropriate: allergies, current medications, past family history, past medical history, past social history, past surgical history and problem list      Past Medical History:   Diagnosis Date   • Anemia    • Asthma    • Chronic kidney disease    • Disease of thyroid gland    • Generalized anxiety disorder    • Kidney stone    • Lyme disease     POSITIVE EXPOSURE WITH TREATMENT       Past Surgical History:   Procedure Laterality Date   • KNEE ARTHROSCOPY      LAST ASSESSED: [id number]       Family History   Problem Relation Age of Onset   • No Known Problems Mother    • No Known Problems Father          Medications have been verified  Objective   /76   Pulse 85   Temp 98 6 °F (37 °C)   Resp 20   Wt 110 kg (242 lb)   SpO2 96%   BMI 32 82 kg/m²   No LMP for male patient  Physical Exam     Physical Exam  Vitals and nursing note reviewed  Constitutional:       General: He is not in acute distress  Appearance: Normal appearance  He is obese  He is not ill-appearing, toxic-appearing or diaphoretic  HENT:      Head: Normocephalic and atraumatic  Right Ear: Tympanic membrane and ear canal normal       Left Ear: Tympanic membrane normal    Eyes:      Extraocular Movements: Extraocular movements intact  Cardiovascular:      Rate and Rhythm: Normal rate and regular rhythm  Pulses: Normal pulses  Heart sounds: Normal heart sounds  Pulmonary:      Effort: Pulmonary effort is normal  No respiratory distress  Breath sounds: No stridor  No wheezing, rhonchi or rales  Comments: CTA but decreased through out   Chest:      Chest wall: No tenderness  Abdominal:      General: There is no distension  Palpations: Abdomen is soft  Tenderness: There is no abdominal tenderness  Musculoskeletal:         General: Normal range of motion  Cervical back: Normal range of motion and neck supple  Skin:     General: Skin is warm and dry  Capillary Refill: Capillary refill takes less than 2 seconds  Neurological:      General: No focal deficit present  Mental Status: He is alert and oriented to person, place, and time  Psychiatric:         Mood and Affect: Mood normal          Behavior: Behavior normal          Thought Content:  Thought content normal          Judgment: Judgment normal            Preliminary reading of CXR  No acute process seen Waiting on rad read    + covid POCT      Vital signs stable  Stable for OP discharge and treatment  Pt was ordered labs 10/14 and are still active  Last Cr 4/8/2022 and was 1 34 - will defer paxlovid to PCP  Pt understands to call PCP today for discussion of Paxlovid

## 2022-10-19 NOTE — LETTER
October 19, 2022     Patient: Rosibel Hayes   YOB: 1970   Date of Visit: 10/19/2022       To Whom It May Concern: It is my medical opinion that Judy Garcia may return to work on 10/24/2022  If you have any questions or concerns, please don't hesitate to call           Sincerely,        GREGG Stuart    CC: No Recipients

## 2022-10-19 NOTE — PATIENT INSTRUCTIONS
Your covid is +  You are to call your PCP Dr Wanda Joe for discussion of Paxlovid treatment if you are interested - you are to call today  You are NOT to take any dayquil or nyquil - cough suppressants  You are to take plain robitussin for cough  Take vitamin C and D3  You are to take tylenol or motrin as needed for fever or pain  You are to follow up with your PCP as instructed  You are to go to the ED if symptoms worsen - fever, vomiting, shortness of breath or chest pain   Speak with your employer regarding return to work status  You are to use your rescue in haler as needed - go to a separate room in the house and use that room for your own while ill  Do the inhaler only in that room  Wear a mask at home  You are to mask for at least 10 days and as long as you have symptoms

## 2022-10-19 NOTE — TELEPHONE ENCOUNTER
Pt called stating he was just seen at the [facility] today  He was tested for covid and was positive  The only thing they prescribed for the pt was an inhaler  PT states they told him to call here and see if you want to prescribe him paxlovid  Please advise

## 2022-10-20 ENCOUNTER — TELEMEDICINE (OUTPATIENT)
Dept: INTERNAL MEDICINE CLINIC | Facility: CLINIC | Age: 52
End: 2022-10-20
Payer: COMMERCIAL

## 2022-10-20 DIAGNOSIS — J45.20 MILD INTERMITTENT ASTHMA WITHOUT COMPLICATION: ICD-10-CM

## 2022-10-20 DIAGNOSIS — U07.1 LAB TEST POSITIVE FOR DETECTION OF COVID-19 VIRUS: Primary | ICD-10-CM

## 2022-10-20 PROCEDURE — 99213 OFFICE O/P EST LOW 20 MIN: CPT | Performed by: INTERNAL MEDICINE

## 2022-10-20 RX ORDER — MELATONIN 10 MG
1 TABLET, SUBLINGUAL SUBLINGUAL DAILY
COMMUNITY

## 2022-10-20 RX ORDER — IBUPROFEN 200 MG
200 TABLET ORAL AS NEEDED
COMMUNITY

## 2022-10-20 RX ORDER — MULTIVIT WITH MINERALS/LUTEIN
1000 TABLET ORAL DAILY
COMMUNITY

## 2022-10-20 NOTE — PATIENT INSTRUCTIONS
COVID-19 (Coronavirus Disease 2019)   WHAT YOU NEED TO KNOW:   COVID-19 is the disease caused by a coronavirus first discovered in December 2019  Coronaviruses generally cause upper respiratory (nose, throat, and lung) infections, such as a cold  The 2019 virus spreads quickly and easily  It can be spread starting 2 to 3 days before symptoms even begin  DISCHARGE INSTRUCTIONS:   Call your local emergency number (911 in the [address] [facility]) if:   You have trouble breathing or shortness of breath at rest     You have chest pain or pressure that lasts longer than 5 minutes  You become confused or hard to wake  Your lips or face are blue  Return to the emergency department if:   You have a fever of 104°F (40°C) or higher  Call your doctor if:   You have symptoms of COVID-19  You have questions or concerns about your condition or care  Medicines: You may need any of the following:  Decongestants  help reduce nasal congestion and help you breathe more easily  If you take decongestant pills, they may make you feel restless or cause problems with your sleep  Do not use decongestant sprays for more than a few days  Cough suppressants  help reduce coughing  Ask your healthcare provider which type of cough medicine is best for you  Acetaminophen  decreases pain and fever  It is available without a doctor's order  Ask how much to take and how often to take it  Follow directions  Read the labels of all other medicines you are using to see if they also contain acetaminophen, or ask your doctor or pharmacist  Acetaminophen can cause liver damage if not taken correctly  Do not use more than 4 grams (4,000 milligrams) total of acetaminophen in one day  NSAIDs , such as ibuprofen, help decrease swelling, pain, and fever  This medicine is available with or without a doctor's order  NSAIDs can cause stomach bleeding or kidney problems in certain people   If you take blood thinner medicine, always ask your healthcare provider if NSAIDs are safe for you  Always read the medicine label and follow directions  Blood thinners  help prevent blood clots  Clots can cause strokes, heart attacks, and death  The following are general safety guidelines to follow while you are taking a blood thinner:    Watch for bleeding and bruising while you take blood thinners  Watch for bleeding from your gums or nose  Watch for blood in your urine and bowel movements  Use a soft washcloth on your skin, and a soft toothbrush to brush your teeth  This can keep your skin and gums from bleeding  If you shave, use an electric shaver  Do not play contact sports  Tell your dentist and other healthcare providers that you take a blood thinner  Wear a bracelet or necklace that says you take this medicine  Do not start or stop any other medicines unless your healthcare provider tells you to  Many medicines cannot be used with blood thinners  Take your blood thinner exactly as prescribed by your healthcare provider  Do not skip does or take less than prescribed  Tell your provider right away if you forget to take your blood thinner, or if you take too much  Warfarin  is a blood thinner that you may need to take  The following are things you should be aware of if you take warfarin:     Foods and medicines can affect the amount of warfarin in your blood  Do not make major changes to your diet while you take warfarin  Warfarin works best when you eat about the same amount of vitamin K every day  Vitamin K is found in green leafy vegetables and certain other foods  Ask for more information about what to eat when you are taking warfarin  You will need to see your healthcare provider for follow-up visits when you are on warfarin  You will need regular blood tests  These tests are used to decide how much medicine you need  Take your medicine as directed    Contact your healthcare provider if you think your medicine is not helping or if you have side effects  Tell him or her if you are allergic to any medicine  Keep a list of the medicines, vitamins, and herbs you take  Include the amounts, and when and why you take them  Bring the list or the pill bottles to follow-up visits  Carry your medicine list with you in case of an emergency  What you need to know about variants: The virus has changed into several new forms (called variants) since it was discovered  The variants may be more contagious (easily spread) than the original form  Some may also cause more severe illness than others  What you need to know about COVID-19 vaccines:  Healthcare providers recommend a COVID-19 vaccine, even if you have already had COVID-19  You are considered fully vaccinated against COVID-19 two weeks after the final dose of any COVID-19 vaccine  Let your healthcare provider know when you have received the final dose of the vaccine  Make a copy of your vaccination card  Keep the original with you in case you need to show it  Keep the copy in a safe place  COVID-19 vaccines are given as a shot in 1 or 2 doses  Vaccination is recommended for everyone 5 years or older  One 2-dose vaccine is fully approved  for those 16 years or older  This vaccine also has an emergency use authorization (EUA) for children 11to 13years old  No vaccine is currently available for children younger than 5 years  A booster (additional) dose  is given to help the immune system continue to protect against severe COVID-19  A booster is recommended for all adults 18 or older  The booster can be a different brand of the COVID-19 vaccine than you originally received  The timing for the booster depends on the type of vaccine you received:    1-dose vaccine: The booster is given at least 2 months after you received the vaccine  2-dose vaccine: The booster is given at least 5 or 6 months after the second dose  A booster can be given to adolescents 15to 16years old    Only 1 COVID-19 vaccine has this EUA  The booster is given at least 5 months after the second dose of the original vaccine series  A booster is recommended for immunocompromised children 11to 6years old  Only 1 COVID-19 vaccine has this EUA  The booster is given 28 days after the second dose  Continue social distancing and other measures, even after you get the vaccine  Although it is not common, you can become infected after you get the vaccine  You may also be able to pass the virus to others without knowing you are infected  After you get the vaccine, check local, national, and international travel rules  You may need to be tested before you travel  Some countries require proof of a negative test before you travel  You may also need to quarantine after you return  Medicine may be given to prevent infection  The medicine can be given if you are at high risk for infection and cannot get the vaccine  It can also be given if your immune system does not respond well to the vaccine  How the 2019 coronavirus spreads:   Droplets are the main way all coronaviruses spread  The virus travels in droplets that form when a person talks, sings, coughs, or sneezes  The droplets can also float in the air for minutes or hours  Infection happens when you breathe in the droplets or get them in your eyes or nose  Close personal contact with an infected person increases your risk for infection  This means being within 6 feet (2 meters) of the person for at least 15 minutes over 24 hours  Person-to-person contact can spread the virus  For example, a person with the virus on his or her hands can spread it by shaking hands with someone  The virus can stay on objects and surfaces for up to 3 days  You may become infected by touching the object or surface and then touching your eyes or mouth  Help lower the risk for COVID-19:   Wash your hands often throughout the day  Use soap and water   Rub your soapy hands together, lacing your fingers, for at least 20 seconds  Rinse with warm, running water  Dry your hands with a clean towel or paper towel  Use hand  that contains alcohol if soap and water are not available  Teach children how to wash their hands and use hand   Cover sneezes and coughs  Turn your face away and cover your mouth and nose with a tissue  Throw the tissue away  Use the bend of your arm if a tissue is not available  Then wash your hands well with soap and water or use hand   Teach children how to cover a cough or sneeze  Wear a face covering (mask) when needed  Use a cloth covering with at least 2 layers  You can also create layers by putting a cloth covering over a disposable non-medical mask  Cover your mouth and your nose  Follow worldwide, national, and local social distancing guidelines  Keep at least 6 feet (2 meters) between you and others  Try not to touch your face  If you get the virus on your hands, you can transfer it to your eyes, nose, or mouth and become infected  You can also transfer it to objects, surfaces, or people  Clean and disinfect high-touch surfaces and objects often  Use disinfecting wipes, or make a solution of 4 teaspoons of bleach in 1 quart (4 cups) of water  Ask about other vaccines you may need  Get the influenza (flu) vaccine as soon as recommended each year, usually starting in September or October  Get the pneumonia vaccine if recommended  Your healthcare provider can tell you if you should also get other vaccines, and when to get them  Follow social distancing guidelines:  National and local social distancing rules vary  Rules and restrictions may change over time as restrictions are lifted  The following are general guidelines:  Stay home if you are sick or think you may have COVID-19  It is important to stay home if you are waiting for a testing appointment or for test results      Avoid close physical contact with anyone who does not live in your home  Do not shake hands with, hug, or kiss a person as a greeting  If you must use public transportation (such as a bus or subway), try to sit or stand away from others  Wear your face covering  Avoid in-person gatherings and crowds  Attend virtually if possible  Follow up with your doctor as directed:  Write down your questions so you remember to ask them during your visits  For more information:   [facility] Prevention  [address] [place] , [address]  Phone: 1- 040 - [phone]  Web Address: [employer] com br    © Copyright [employer] 2022 Information is for End User's use only and may not be sold, redistributed or otherwise used for commercial purposes  All illustrations and images included in CareNotes® are the copyrighted property of [employer]  or [facility] Edgardo Ivey   The above information is an  only  It is not intended as medical advice for individual conditions or treatments  Talk to your doctor, nurse or pharmacist before following any medical regimen to see if it is safe and effective for you

## 2022-10-20 NOTE — PROGRESS NOTES
COVID-19 Outpatient Progress Note    Assessment/Plan:    Problem List Items Addressed This Visit        Respiratory    Asthma      Other Visit Diagnoses     Lab test positive for detection of COVID-19 virus    -  Primary         Disposition:     Patient has asymptomatic or mild COVID-19 infection  Based off CDC guidelines, they were recommended to isolate for 5 days  If they are asymptomatic or symptoms are improving with no fevers in the past 24 hours, isolation may be ended followed by 5 days of wearing a mask when around othes to minimize risk of infecting others  If still have a fever or other symptoms have not improved, continue to isolate until they improve  Regardless of when they end isolation, avoid being around people who are more likely to get very sick from COVID-19 until at least day 11  Patient with moderate or severe illness or has a weakened immune system  They should isolate from others through at least day 10  Isolation can be ended if symptoms are improving and they are fever free for the past 24 hours  If they still have fever or other symptoms have not improved, continue to isolate until they improve  Regardless of when you isolation is ended, avoid being around people who are more likely to get very sick from COVID-19 until at least day 11  A/P: Day # 3 since onset of COVID s/s  Covid test was positive at [facility]    Doing much better today  No fever or chills URI s/s improving with cough presents, but no SOB today  Has not had to use his KAYLAN  Susan Skipper Discussed paxlovid and doesn't really meet the criteria and agrees with this assessment    Continue isolation/quarantine, increase fluids, PRN motrin/tylenol, and breathing exercises  RTC one day for f/u and to see if pt needs paxlovid  I have spent 20 minutes directly with the patient   Greater than 50% of this time was spent in counseling/coordination of care regarding: diagnostic results, prognosis, risks and benefits of treatment options, instructions for management, patient and family education, importance of treatment compliance, risk factor reductions and impressions  Encounter provider: Hermelinda Wilcox DO     Provider located at: [address]  [address] [facility] [address]     Recent Visits  Date Type Provider Dept   10/19/22 Telephone Hermelinda Wilcox DO [facility] [address] [facility]   10/14/22 Office Visit DO Richard Dhillon Amirah recent visits within past 7 days and meeting all other requirements  Today's Visits  Date Type Provider Dept   10/20/22 Telemedicine DO Richard Dhillon today's visits and meeting all other requirements  Future Appointments  No visits were found meeting these conditions  Showing future appointments within next 150 days and meeting all other requirements     This virtual check-in was done via [facility] and patient was informed that this is a secure, HIPAA-compliant platform  He agrees to proceed  Patient agrees to participate in a virtual check in via telephone or video visit instead of presenting to the office to address urgent/immediate medical needs  Patient is aware this is a billable service  He acknowledged consent and understanding of privacy and security of the video platform  The patient has agreed to participate and understands they can discontinue the visit at any time  After connecting through [facility], the patient was identified by name and date of birth  Virgie Yousif was informed that this was a telemedicine visit and that the exam was being conducted confidentially over secure lines  My office door was closed  No one else was in the room  Virgie Yousif acknowledged consent and understanding of privacy and security of the telemedicine visit   I informed the patient that I have reviewed his record in Epic and presented the opportunity for him to ask any questions regarding the visit today  The patient agreed to participate  Verification of patient location:  Patient is located in the following state in which I hold an active license: [place]    Subjective:   Lita Runner is a 47 y o  male who has been screened for COVID-19  Symptom change since last report: improving  Patient's symptoms include cough  Patient denies fever, chills, fatigue, congestion, rhinorrhea, sore throat, anosmia, loss of taste, shortness of breath, chest tightness, abdominal pain, nausea, vomiting, diarrhea, myalgias and headaches  - Date of symptom onset: 10/17/2022  - Date of positive COVID-19 test: 10/19/2022  Type of test: PCR  COVID-19 vaccination status: Fully vaccinated with booster    Chrystal Tran has been staying home and has isolated themselves in his home  He is taking care to not share personal items and is cleaning all surfaces that are touched often, like counters, tabletops, and doorknobs using household cleaning sprays or wipes  He is wearing a mask when he leaves his room  Lab Results   Component Value Date    SARSCOV2 Negative 02/03/2022    SARSCOVAG Positive (A) 10/19/2022       Review of Systems   Constitutional: Positive for activity change and appetite change  Negative for chills, diaphoresis, fatigue and fever  HENT: Positive for voice change  Negative for congestion, ear discharge, ear pain, facial swelling, postnasal drip, rhinorrhea, sinus pressure, sinus pain and sore throat  No loss of taste/smell  Respiratory: Positive for cough  Negative for chest tightness, shortness of breath and wheezing  Cardiovascular: Negative for chest pain, palpitations and leg swelling  Gastrointestinal: Negative for abdominal pain, constipation, diarrhea, nausea and vomiting  Genitourinary: Negative for difficulty urinating, dysuria and frequency  Musculoskeletal: Negative for arthralgias, gait problem and myalgias  Neurological: Negative for light-headedness and headaches  Psychiatric/Behavioral: Negative for confusion  The patient is not nervous/anxious  Current Outpatient Medications on File Prior to Visit   Medication Sig   • Ascorbic Acid (vitamin C) 1000 MG tablet Take 1,000 mg by mouth daily   • Cholecalciferol (Vitamin D3) 250 MCG (42189 UT) TABS Take 1 tablet by mouth in the morning   • fluticasone (FLONASE) 50 mcg/act nasal spray 1 spray into each nostril daily (Patient taking differently: 1 spray into each nostril if needed)   • ibuprofen (Advil) 200 mg tablet Take 200 mg by mouth if needed for mild pain   • levothyroxine 200 mcg tablet Take 1 tablet (200 mcg total) by mouth every morning   • albuterol (ProAir HFA) 90 mcg/act inhaler Inhale 2 puffs every 6 (six) hours as needed for wheezing (Patient not taking: Reported on 10/20/2022)   • albuterol (Proventil HFA) 90 mcg/act inhaler Inhale 2 puffs every 6 (six) hours as needed for wheezing or shortness of breath (Patient not taking: No sig reported)   • clotrimazole-betamethasone (LOTRISONE) 1-0 05 % cream Apply topically 2 (two) times a day (Patient not taking: No sig reported)   • meloxicam (MOBIC) 15 mg tablet Take 1 tablet (15 mg total) by mouth daily (Patient not taking: No sig reported)       Objective: There were no vitals taken for this visit  Physical Exam  Vitals and nursing note reviewed  Constitutional:       General: He is not in acute distress  Appearance: Normal appearance  He is not ill-appearing  HENT:      Head: Normocephalic and atraumatic  Mouth/Throat:      Mouth: Mucous membranes are moist    Eyes:      Extraocular Movements: Extraocular movements intact  Conjunctiva/sclera: Conjunctivae normal       Pupils: Pupils are equal, round, and reactive to light  Pulmonary:      Effort: Pulmonary effort is normal  No respiratory distress  Neurological:      General: No focal deficit present  Mental Status: He is alert and oriented to person, place, and time   Mental status is at baseline  Psychiatric:         Mood and Affect: Mood normal          Behavior: Behavior normal          Thought Content:  Thought content normal          Judgment: Judgment normal        Giorgi Blazing, DO

## 2022-10-21 ENCOUNTER — TELEMEDICINE (OUTPATIENT)
Dept: INTERNAL MEDICINE CLINIC | Facility: CLINIC | Age: 52
End: 2022-10-21
Payer: COMMERCIAL

## 2022-10-21 DIAGNOSIS — U07.1 LAB TEST POSITIVE FOR DETECTION OF COVID-19 VIRUS: Primary | ICD-10-CM

## 2022-10-21 DIAGNOSIS — J45.20 MILD INTERMITTENT ASTHMA WITHOUT COMPLICATION: ICD-10-CM

## 2022-10-21 PROCEDURE — 99213 OFFICE O/P EST LOW 20 MIN: CPT | Performed by: INTERNAL MEDICINE

## 2022-10-21 NOTE — PROGRESS NOTES
COVID-19 Outpatient Progress Note    Assessment/Plan:    Problem List Items Addressed This Visit        Respiratory    Asthma      Other Visit Diagnoses     Lab test positive for detection of COVID-19 virus    -  Primary         Disposition:     Patient has asymptomatic or mild COVID-19 infection  Based off CDC guidelines, they were recommended to isolate for 5 days  If they are asymptomatic or symptoms are improving with no fevers in the past 24 hours, isolation may be ended followed by 5 days of wearing a mask when around othes to minimize risk of infecting others  If still have a fever or other symptoms have not improved, continue to isolate until they improve  Regardless of when they end isolation, avoid being around people who are more likely to get very sick from COVID-19 until at least day 11  Patient with moderate or severe illness or has a weakened immune system  They should isolate from others through at least day 10  Isolation can be ended if symptoms are improving and they are fever free for the past 24 hours  If they still have fever or other symptoms have not improved, continue to isolate until they improve  Regardless of when you isolation is ended, avoid being around people who are more likely to get very sick from COVID-19 until at least day 11  A/P: Day # 4 since onset of COVID s/s  Covid test was positive  Doing a lot better and mainly nasal congestion  No SOB    Continue isolation/quarantine, increase fluids, PRN motrin/tylenol, and breathing exercises  May lift restrictions as of 10/23, but will need to mask and practice distancing for five more days  RTC as scheduled for f/u  I have spent 15 minutes directly with the patient   Greater than 50% of this time was spent in counseling/coordination of care regarding: prognosis, risks and benefits of treatment options, instructions for management, patient and family education, importance of treatment compliance, risk factor reductions and impressions  Encounter provider: Nguyen Franklin DO     Provider located at: [address] [place] [id number]     Recent Visits  Date Type Provider Dept   10/20/22 Telemedicine Nguyen Franklin DO [address] [facility]   10/19/22 Telephone Nguyen Franklin DO [facility]   10/14/22 Office Visit Nguyen Franklin DO [facility]   Showing recent visits within past 7 days and meeting all other requirements  Today's Visits  Date Type Provider Dept   10/21/22 Telemedicine Nguyen Franklin DO [facility] today's visits and meeting all other requirements  Future Appointments  No visits were found meeting these conditions  Showing future appointments within next 150 days and meeting all other requirements       Patient agrees to participate in a virtual check in via telephone or video visit instead of presenting to the office to address urgent/immediate medical needs  Patient is aware this is a billable service  He acknowledged consent and understanding of privacy and security of the video platform  The patient has agreed to participate and understands they can discontinue the visit at any time  After connecting through [facility], the patient was identified by name and date of birth  Indiana Ruiz was informed that this was a telemedicine visit and that the exam was being conducted confidentially over secure lines  My office door was closed  No one else was in the room  Indiana Ruiz acknowledged consent and understanding of privacy and security of the telemedicine visit  I informed the patient that I have reviewed his record in Epic and presented the opportunity for him to ask any questions regarding the visit today  The patient agreed to participate      Verification of patient location:  Patient is located in the following state in which I hold an active license: [place]    Subjective:   Indiana Ruiz is a 47 y o  male who has been screened for COVID-19  Symptom change since last report: improving  Patient's symptoms include nasal congestion and cough  Patient denies fever, chills, fatigue, rhinorrhea, sore throat, anosmia, loss of taste, shortness of breath, chest tightness, abdominal pain, nausea, vomiting, diarrhea, myalgias and headaches  - Date of symptom onset: 10/17/2022  - Date of positive COVID-19 test: 10/19/2022  Type of test: PCR  COVID-19 vaccination status: Fully vaccinated with booster    Lab Results   Component Value Date    SARSCOV2 Negative 02/03/2022    SARSCOVAG Positive (A) 10/19/2022       Review of Systems   Constitutional: Positive for activity change and appetite change  Negative for chills, diaphoresis, fatigue and fever  HENT: Positive for congestion  Negative for ear discharge, ear pain, postnasal drip, rhinorrhea, sinus pressure, sinus pain and sore throat  No loss of taste/smell  Respiratory: Positive for cough  Negative for chest tightness, shortness of breath and wheezing  Cardiovascular: Negative for chest pain, palpitations and leg swelling  Gastrointestinal: Negative for abdominal pain, constipation, diarrhea, nausea and vomiting  Genitourinary: Negative for difficulty urinating, dysuria and frequency  Musculoskeletal: Negative for arthralgias, gait problem and myalgias  Neurological: Negative for light-headedness and headaches  Psychiatric/Behavioral: Negative for confusion  The patient is not nervous/anxious        Current Outpatient Medications on File Prior to Visit   Medication Sig   • Ascorbic Acid (vitamin C) 1000 MG tablet Take 1,000 mg by mouth daily   • Cholecalciferol (Vitamin D3) 250 MCG (29414 UT) TABS Take 1 tablet by mouth in the morning   • fluticasone (FLONASE) 50 mcg/act nasal spray 1 spray into each nostril daily (Patient taking differently: 1 spray into each nostril if needed)   • ibuprofen (MOTRIN) 200 mg tablet Take 200 mg by mouth if needed for mild pain   • levothyroxine 200 mcg tablet Take 1 tablet (200 mcg total) by mouth every morning   • albuterol (ProAir HFA) 90 mcg/act inhaler Inhale 2 puffs every 6 (six) hours as needed for wheezing (Patient not taking: No sig reported)   • albuterol (Proventil HFA) 90 mcg/act inhaler Inhale 2 puffs every 6 (six) hours as needed for wheezing or shortness of breath (Patient not taking: No sig reported)   • clotrimazole-betamethasone (LOTRISONE) 1-0 05 % cream Apply topically 2 (two) times a day (Patient not taking: No sig reported)   • meloxicam (MOBIC) 15 mg tablet Take 1 tablet (15 mg total) by mouth daily (Patient not taking: No sig reported)       Objective: There were no vitals taken for this visit  Physical Exam  Vitals and nursing note reviewed  Constitutional:       General: He is not in acute distress  Appearance: Normal appearance  He is not ill-appearing  HENT:      Head: Normocephalic and atraumatic  Mouth/Throat:      Mouth: Mucous membranes are moist    Eyes:      Extraocular Movements: Extraocular movements intact  Conjunctiva/sclera: Conjunctivae normal       Pupils: Pupils are equal, round, and reactive to light  Pulmonary:      Effort: Pulmonary effort is normal  No respiratory distress  Neurological:      General: No focal deficit present  Mental Status: He is alert and oriented to person, place, and time  Mental status is at baseline  Psychiatric:         Mood and Affect: Mood normal          Behavior: Behavior normal          Thought Content:  Thought content normal          Judgment: Judgment normal        Bill Quinonez DO

## 2022-10-21 NOTE — PATIENT INSTRUCTIONS
COVID-19 (Coronavirus Disease 2019)   WHAT YOU NEED TO KNOW:   COVID-19 is the disease caused by a coronavirus first discovered in December 2019  Coronaviruses generally cause upper respiratory (nose, throat, and lung) infections, such as a cold  The 2019 virus spreads quickly and easily  It can be spread starting 2 to 3 days before symptoms even begin  DISCHARGE INSTRUCTIONS:   Call your local emergency number (911 in the [address] [facility]) if:   You have trouble breathing or shortness of breath at rest     You have chest pain or pressure that lasts longer than 5 minutes  You become confused or hard to wake  Your lips or face are blue  Return to the emergency department if:   You have a fever of 104°F (40°C) or higher  Call your doctor if:   You have symptoms of COVID-19  You have questions or concerns about your condition or care  Medicines: You may need any of the following:  Decongestants  help reduce nasal congestion and help you breathe more easily  If you take decongestant pills, they may make you feel restless or cause problems with your sleep  Do not use decongestant sprays for more than a few days  Cough suppressants  help reduce coughing  Ask your healthcare provider which type of cough medicine is best for you  Acetaminophen  decreases pain and fever  It is available without a doctor's order  Ask how much to take and how often to take it  Follow directions  Read the labels of all other medicines you are using to see if they also contain acetaminophen, or ask your doctor or pharmacist  Acetaminophen can cause liver damage if not taken correctly  Do not use more than 4 grams (4,000 milligrams) total of acetaminophen in one day  NSAIDs , such as ibuprofen, help decrease swelling, pain, and fever  This medicine is available with or without a doctor's order  NSAIDs can cause stomach bleeding or kidney problems in certain people   If you take blood thinner medicine, always ask your healthcare provider if NSAIDs are safe for you  Always read the medicine label and follow directions  Blood thinners  help prevent blood clots  Clots can cause strokes, heart attacks, and death  The following are general safety guidelines to follow while you are taking a blood thinner:    Watch for bleeding and bruising while you take blood thinners  Watch for bleeding from your gums or nose  Watch for blood in your urine and bowel movements  Use a soft washcloth on your skin, and a soft toothbrush to brush your teeth  This can keep your skin and gums from bleeding  If you shave, use an electric shaver  Do not play contact sports  Tell your dentist and other healthcare providers that you take a blood thinner  Wear a bracelet or necklace that says you take this medicine  Do not start or stop any other medicines unless your healthcare provider tells you to  Many medicines cannot be used with blood thinners  Take your blood thinner exactly as prescribed by your healthcare provider  Do not skip does or take less than prescribed  Tell your provider right away if you forget to take your blood thinner, or if you take too much  Warfarin  is a blood thinner that you may need to take  The following are things you should be aware of if you take warfarin:     Foods and medicines can affect the amount of warfarin in your blood  Do not make major changes to your diet while you take warfarin  Warfarin works best when you eat about the same amount of vitamin K every day  Vitamin K is found in green leafy vegetables and certain other foods  Ask for more information about what to eat when you are taking warfarin  You will need to see your healthcare provider for follow-up visits when you are on warfarin  You will need regular blood tests  These tests are used to decide how much medicine you need  Take your medicine as directed    Contact your healthcare provider if you think your medicine is not helping or if you have side effects  Tell him or her if you are allergic to any medicine  Keep a list of the medicines, vitamins, and herbs you take  Include the amounts, and when and why you take them  Bring the list or the pill bottles to follow-up visits  Carry your medicine list with you in case of an emergency  What you need to know about variants: The virus has changed into several new forms (called variants) since it was discovered  The variants may be more contagious (easily spread) than the original form  Some may also cause more severe illness than others  What you need to know about COVID-19 vaccines:  Healthcare providers recommend a COVID-19 vaccine, even if you have already had COVID-19  You are considered fully vaccinated against COVID-19 two weeks after the final dose of any COVID-19 vaccine  Let your healthcare provider know when you have received the final dose of the vaccine  Make a copy of your vaccination card  Keep the original with you in case you need to show it  Keep the copy in a safe place  COVID-19 vaccines are given as a shot in 1 or 2 doses  Vaccination is recommended for everyone 5 years or older  One 2-dose vaccine is fully approved  for those 16 years or older  This vaccine also has an emergency use authorization (EUA) for children 11to 13years old  No vaccine is currently available for children younger than 5 years  A booster (additional) dose  is given to help the immune system continue to protect against severe COVID-19  A booster is recommended for all adults 18 or older  The booster can be a different brand of the COVID-19 vaccine than you originally received  The timing for the booster depends on the type of vaccine you received:    1-dose vaccine: The booster is given at least 2 months after you received the vaccine  2-dose vaccine: The booster is given at least 5 or 6 months after the second dose  A booster can be given to adolescents 15to 16years old    Only 1 COVID-19 vaccine has this EUA  The booster is given at least 5 months after the second dose of the original vaccine series  A booster is recommended for immunocompromised children 11to 6years old  Only 1 COVID-19 vaccine has this EUA  The booster is given 28 days after the second dose  Continue social distancing and other measures, even after you get the vaccine  Although it is not common, you can become infected after you get the vaccine  You may also be able to pass the virus to others without knowing you are infected  After you get the vaccine, check local, national, and international travel rules  You may need to be tested before you travel  Some countries require proof of a negative test before you travel  You may also need to quarantine after you return  Medicine may be given to prevent infection  The medicine can be given if you are at high risk for infection and cannot get the vaccine  It can also be given if your immune system does not respond well to the vaccine  How the 2019 coronavirus spreads:   Droplets are the main way all coronaviruses spread  The virus travels in droplets that form when a person talks, sings, coughs, or sneezes  The droplets can also float in the air for minutes or hours  Infection happens when you breathe in the droplets or get them in your eyes or nose  Close personal contact with an infected person increases your risk for infection  This means being within 6 feet (2 meters) of the person for at least 15 minutes over 24 hours  Person-to-person contact can spread the virus  For example, a person with the virus on his or her hands can spread it by shaking hands with someone  The virus can stay on objects and surfaces for up to 3 days  You may become infected by touching the object or surface and then touching your eyes or mouth  Help lower the risk for COVID-19:   Wash your hands often throughout the day  Use soap and water   Rub your soapy hands together, lacing your fingers, for at least 20 seconds  Rinse with warm, running water  Dry your hands with a clean towel or paper towel  Use hand  that contains alcohol if soap and water are not available  Teach children how to wash their hands and use hand   Cover sneezes and coughs  Turn your face away and cover your mouth and nose with a tissue  Throw the tissue away  Use the bend of your arm if a tissue is not available  Then wash your hands well with soap and water or use hand   Teach children how to cover a cough or sneeze  Wear a face covering (mask) when needed  Use a cloth covering with at least 2 layers  You can also create layers by putting a cloth covering over a disposable non-medical mask  Cover your mouth and your nose  Follow worldwide, national, and local social distancing guidelines  Keep at least 6 feet (2 meters) between you and others  Try not to touch your face  If you get the virus on your hands, you can transfer it to your eyes, nose, or mouth and become infected  You can also transfer it to objects, surfaces, or people  Clean and disinfect high-touch surfaces and objects often  Use disinfecting wipes, or make a solution of 4 teaspoons of bleach in 1 quart (4 cups) of water  Ask about other vaccines you may need  Get the influenza (flu) vaccine as soon as recommended each year, usually starting in September or October  Get the pneumonia vaccine if recommended  Your healthcare provider can tell you if you should also get other vaccines, and when to get them  Follow social distancing guidelines:  National and local social distancing rules vary  Rules and restrictions may change over time as restrictions are lifted  The following are general guidelines:  Stay home if you are sick or think you may have COVID-19  It is important to stay home if you are waiting for a testing appointment or for test results      Avoid close physical contact with anyone who does not live in your home  Do not shake hands with, hug, or kiss a person as a greeting  If you must use public transportation (such as a bus or subway), try to sit or stand away from others  Wear your face covering  Avoid in-person gatherings and crowds  Attend virtually if possible  Follow up with your doctor as directed:  Write down your questions so you remember to ask them during your visits  For more information:   [facility] Disease Control and Prevention  [address] [place] , [address]  Phone: 1- 500 - 104-5555  Web Address: [employer] com br    © Copyright [employer] 2022 Information is for End User's use only and may not be sold, redistributed or otherwise used for commercial purposes  All illustrations and images included in CareNotes® are the copyrighted property of [employer]  or [facility] Edgardo Ivey   The above information is an  only  It is not intended as medical advice for individual conditions or treatments  Talk to your doctor, nurse or pharmacist before following any medical regimen to see if it is safe and effective for you

## 2023-04-07 ENCOUNTER — RA CDI HCC (OUTPATIENT)
Dept: OTHER | Facility: HOSPITAL | Age: 53
End: 2023-04-07

## 2023-04-07 NOTE — PROGRESS NOTES
[facility]  coding opportunities          Chart Reviewed number of suggestions sent to Provider: 1   [id number]    Patients Insurance        Commercial Insurance: [employer]

## 2023-06-20 DIAGNOSIS — E03.8 HYPOTHYROIDISM DUE TO HASHIMOTO'S THYROIDITIS: ICD-10-CM

## 2023-06-20 DIAGNOSIS — E06.3 HYPOTHYROIDISM DUE TO HASHIMOTO'S THYROIDITIS: ICD-10-CM

## 2023-06-20 RX ORDER — LEVOTHYROXINE SODIUM 0.05 MG/1
25 TABLET ORAL
Qty: 45 TABLET | Refills: 1 | Status: SHIPPED | OUTPATIENT
Start: 2023-06-20

## 2023-06-20 RX ORDER — LEVOTHYROXINE SODIUM 0.2 MG/1
200 TABLET ORAL EVERY MORNING
Qty: 90 TABLET | Refills: 1 | Status: SHIPPED | OUTPATIENT
Start: 2023-06-20

## 2023-06-23 ENCOUNTER — HOSPITAL ENCOUNTER (OUTPATIENT)
Dept: MRI IMAGING | Facility: HOSPITAL | Age: 53
End: 2023-06-23
Attending: PODIATRIST
Payer: COMMERCIAL

## 2023-06-23 DIAGNOSIS — M79.672 PAIN IN LEFT FOOT: ICD-10-CM

## 2023-06-23 DIAGNOSIS — M79.671 PAIN IN RIGHT FOOT: ICD-10-CM

## 2023-06-23 PROCEDURE — 73718 MRI LOWER EXTREMITY W/O DYE: CPT

## 2023-06-23 PROCEDURE — G1004 CDSM NDSC: HCPCS

## 2023-08-08 ENCOUNTER — TELEPHONE (OUTPATIENT)
Dept: INTERNAL MEDICINE CLINIC | Facility: CLINIC | Age: 53
End: 2023-08-08

## 2023-08-08 DIAGNOSIS — G89.29 CHRONIC LOW BACK PAIN, UNSPECIFIED BACK PAIN LATERALITY, UNSPECIFIED WHETHER SCIATICA PRESENT: Primary | ICD-10-CM

## 2023-08-08 DIAGNOSIS — M54.50 CHRONIC LOW BACK PAIN, UNSPECIFIED BACK PAIN LATERALITY, UNSPECIFIED WHETHER SCIATICA PRESENT: Primary | ICD-10-CM

## 2023-08-08 RX ORDER — MELOXICAM 7.5 MG/1
7.5 TABLET ORAL DAILY
Qty: 30 TABLET | Refills: 1 | Status: SHIPPED | OUTPATIENT
Start: 2023-08-08
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
## 2023-08-29 ENCOUNTER — TELEPHONE (OUTPATIENT)
Dept: NEUROLOGY | Facility: CLINIC | Age: 53
End: 2023-08-29

## 2023-11-06 ENCOUNTER — OFFICE VISIT (OUTPATIENT)
Dept: INTERNAL MEDICINE CLINIC | Facility: CLINIC | Age: 53
End: 2023-11-06
Payer: COMMERCIAL

## 2023-11-06 VITALS
SYSTOLIC BLOOD PRESSURE: 130 MMHG | DIASTOLIC BLOOD PRESSURE: 80 MMHG | TEMPERATURE: 98.9 F | OXYGEN SATURATION: 96 % | HEART RATE: 73 BPM | HEIGHT: 72 IN | BODY MASS INDEX: 33.72 KG/M2 | WEIGHT: 249 LBS

## 2023-11-06 DIAGNOSIS — E06.3 HYPOTHYROIDISM DUE TO HASHIMOTO'S THYROIDITIS: ICD-10-CM

## 2023-11-06 DIAGNOSIS — F41.1 GENERALIZED ANXIETY DISORDER: ICD-10-CM

## 2023-11-06 DIAGNOSIS — D64.9 ANEMIA, UNSPECIFIED TYPE: ICD-10-CM

## 2023-11-06 DIAGNOSIS — E03.8 HYPOTHYROIDISM DUE TO HASHIMOTO'S THYROIDITIS: ICD-10-CM

## 2023-11-06 DIAGNOSIS — J30.9 ALLERGIC RHINITIS, UNSPECIFIED SEASONALITY, UNSPECIFIED TRIGGER: ICD-10-CM

## 2023-11-06 DIAGNOSIS — Z72.0 CHEWS TOBACCO: ICD-10-CM

## 2023-11-06 DIAGNOSIS — Z12.11 ENCOUNTER FOR SCREENING FOR COLORECTAL MALIGNANT NEOPLASM: ICD-10-CM

## 2023-11-06 DIAGNOSIS — E78.2 MIXED HYPERLIPIDEMIA: ICD-10-CM

## 2023-11-06 DIAGNOSIS — Z12.12 ENCOUNTER FOR SCREENING FOR COLORECTAL MALIGNANT NEOPLASM: ICD-10-CM

## 2023-11-06 DIAGNOSIS — J45.20 MILD INTERMITTENT ASTHMA WITHOUT COMPLICATION: ICD-10-CM

## 2023-11-06 DIAGNOSIS — Z23 ENCOUNTER FOR VACCINATION: ICD-10-CM

## 2023-11-06 DIAGNOSIS — Z00.00 WELL ADULT EXAM: Primary | ICD-10-CM

## 2023-11-06 PROCEDURE — 99396 PREV VISIT EST AGE 40-64: CPT | Performed by: INTERNAL MEDICINE

## 2023-11-06 NOTE — PATIENT INSTRUCTIONS
Cigarette Smoking and Your Health   AMBULATORY CARE:   Risks to your health if you smoke:  Nicotine and other chemicals found in tobacco and e-cigarettes can damage every cell in your body. Even if you are a light smoker, you have an increased risk for cancer, heart disease, and lung disease. If you are pregnant or have diabetes, smoking increases your risk for complications. Nicotine can affect an adolescent's developing brain. This can lead to trouble thinking, learning, or paying attention. Benefits to your health if you stop smoking: You decrease respiratory symptoms such as coughing, wheezing, and shortness of breath. You reduce your risk for cancers of the lung, mouth, throat, kidney, bladder, pancreas, stomach, and cervix. If you already have cancer, you increase the benefits of chemotherapy. You also reduce your risk for cancer returning or a second cancer from developing. You reduce your risk for heart disease, blood clots, heart attack, and stroke. You reduce your risk for lung infections, and diseases such as pneumonia, asthma, chronic bronchitis, and emphysema. Your circulation improves. More oxygen can be delivered to your body. If you have diabetes, you lower your risk for complications, such as kidney, artery, and eye diseases. You also lower your risk for nerve damage. Nerve damage can lead to amputations, poor vision, and blindness. You improve your body's ability to heal and to fight infections. An adolescent can help his or her brain and body develop in a healthy way. Talk to your adolescent about all the health risks of nicotine. If you can, start talking about nicotine when your child is younger than 12 years. This may make it easier for him or her not to start using nicotine as a teenager or adult. Explain to him or her that it is best never to start. It can be hard to try to quit later.     Benefits to the health of others if you stop smoking:  Tobacco is harmful to nonsmokers who breathe in your secondhand smoke. The following are ways the health of others around you may improve when you stop smoking: You lower the risks for lung cancer, heart disease, and stroke in nonsmoking adults. If you are pregnant, you lower the risk for miscarriage, early delivery, low birth weight, and stillbirth. You also lower your baby's risk for SIDS, obesity, developmental delay, and neurobehavioral problems, such as ADHD. If you have children, you lower their risk for ear infections, colds, pneumonia, bronchitis, and asthma. Follow up with your doctor as directed:  Write down your questions so you remember to ask them during your visits. For support and more information:   American [employer] Association  [address] [facility]  Phone: [address]  Phone: 5- 589 - 701-7626  Web Address: Arnetha Castleman. [facility]    Smokefree. gov  Phone: 6- 642 - 304-7648  Web Address: www.smokefree. gov  © Copyright [employer] 2023 Information is for End User's use only and may not be sold, redistributed or otherwise used for commercial purposes. The above information is an  only. It is not intended as medical advice for individual conditions or treatments. Talk to your doctor, nurse or pharmacist before following any medical regimen to see if it is safe and effective for you.

## 2023-11-06 NOTE — PROGRESS NOTES
Depression Screening and Follow-up Plan: Patient was screened for depression during today's encounter. They screened negative with a PHQ-2 score of 0. Tobacco Cessation Counseling: Tobacco cessation counseling was provided. The patient is sincerely urged to quit consumption of tobacco. He is not ready to quit tobacco. Medication options discussed. Side effects of medication not discussed. Patient refused medication. Assessment/Plan:  Problem List Items Addressed This Visit          Endocrine    Hypothyroidism    Relevant Orders    TSH, 3rd generation       Respiratory    Asthma    Allergic rhinitis       Other    Hyperlipidemia    Relevant Orders    Comprehensive metabolic panel    LDL cholesterol, direct    Triglycerides    Generalized anxiety disorder    Chews tobacco    Anemia     Other Visit Diagnoses       Well adult exam    -  Primary    Encounter for vaccination        Encounter for screening for colorectal malignant neoplasm        Relevant Orders    Cologuard             Diagnoses and all orders for this visit:    Well adult exam    Encounter for vaccination    Mild intermittent asthma without complication    Allergic rhinitis, unspecified seasonality, unspecified trigger    Hypothyroidism due to Hashimoto's thyroiditis  -     TSH, 3rd generation; Future    Anemia, unspecified type    Chews tobacco    Generalized anxiety disorder    Mixed hyperlipidemia  -     Comprehensive metabolic panel; Future  -     LDL cholesterol, direct; Future  -     Triglycerides; Future    Encounter for screening for colorectal malignant neoplasm  -     Cologuard        No problem-specific Assessment & Plan notes found for this encounter. A/P: Doing ok and will check labs. Discussed vaccines defers the flu vaccine. Wean chewing tobacco and keep f/u with DDS. Defers NATY Continue current treatment and RTC six months for routine. Subjective:      Patient ID: Sheyla Lou is a 47 y.o. male.      presents for a well exam. Doing ok and no c/o's. Remains active w/o difficulty and no falls. Asthma is controlled and no KAYLAN use. Doesn't smoke, but chews tobacco.  Chronic pain is controlled. Denies depression/AZALEA. No suicidal or violent ideations. Working full time as a teacher. No recent travel. No fever, chills, or sweats. No unexplained wt change. Denies CP, SOB, palpitations, edema, orthopnea, or PND. No sz or syncope. No changes in bowel or bladder habits. No trouble swallowing. Appetite and sleep are good. Sees both a DDS and an eye doctor. No change in PMH, PSH, ALL, OH, SH, or Fhx. Currently due for labs and vaccines . The following portions of the patient's history were reviewed and updated as appropriate:   He has a past medical history of Anemia, Asthma, Chronic kidney disease, Disease of thyroid gland, Generalized anxiety disorder, Kidney stone, and Lyme disease. ,  does not have any pertinent problems on file. ,   has a past surgical history that includes Knee arthroscopy. ,  family history includes No Known Problems in his father and mother. ,   reports that he has never smoked. He has never been exposed to tobacco smoke. His smokeless tobacco use includes chew. He reports current alcohol use. He reports that he does not use drugs. ,  has No Known Allergies. .  Current Outpatient Medications   Medication Sig Dispense Refill    clotrimazole-betamethasone (LOTRISONE) 1-0.05 % cream Apply topically 2 (two) times a day 45 g 3    fluticasone (FLONASE) 50 mcg/act nasal spray 1 spray into each nostril daily (Patient taking differently: 1 spray into each nostril if needed) 16 g 0    ibuprofen (MOTRIN) 200 mg tablet Take 200 mg by mouth if needed for mild pain      levothyroxine (Synthroid) 50 mcg tablet Take 0.5 tablets (25 mcg total) by mouth daily in the early morning Along with your 200mcg pill for a total daily dose of 225mcg q day.  45 tablet 1    levothyroxine 200 mcg tablet Take 1 tablet (200 mcg total) by mouth every morning 90 tablet 1    meloxicam (MOBIC) 7.5 mg tablet Take 1 tablet (7.5 mg total) by mouth daily 30 tablet 1     No current facility-administered medications for this visit. Review of Systems   Constitutional:  Negative for activity change, chills, diaphoresis, fatigue and fever. HENT: Negative. Eyes:  Negative for visual disturbance. Respiratory:  Negative for cough, chest tightness, shortness of breath and wheezing. Cardiovascular:  Negative for chest pain, palpitations and leg swelling. Gastrointestinal:  Negative for abdominal pain, constipation, diarrhea, nausea and vomiting. Endocrine: Negative for cold intolerance and heat intolerance. Genitourinary:  Negative for difficulty urinating, dysuria and frequency. Musculoskeletal:  Negative for arthralgias, gait problem and myalgias. Neurological:  Negative for dizziness, seizures, syncope, weakness, light-headedness and headaches. Psychiatric/Behavioral:  Negative for confusion, dysphoric mood and sleep disturbance. The patient is not nervous/anxious. PHQ-2/9 Depression Screening    Little interest or pleasure in doing things: 0 - not at all  Feeling down, depressed, or hopeless: 0 - not at all  PHQ-2 Score: 0  PHQ-2 Interpretation: Negative depression screen        Objective:  Vitals:    11/06/23 1539   BP: 130/80   Pulse: 73   Temp: 98.9 °F (37.2 °C)   SpO2: 96%   Weight: 113 kg (249 lb)   Height: 6' (1.829 m)     Body mass index is 33.77 kg/m². Physical Exam  Vitals and nursing note reviewed. Constitutional:       General: He is not in acute distress. Appearance: Normal appearance. He is obese. He is not ill-appearing. HENT:      Head: Normocephalic and atraumatic. Mouth/Throat:      Mouth: Mucous membranes are moist.   Eyes:      Extraocular Movements: Extraocular movements intact. Conjunctiva/sclera: Conjunctivae normal.      Pupils: Pupils are equal, round, and reactive to light.    Neck: Vascular: No carotid bruit. Cardiovascular:      Rate and Rhythm: Normal rate and regular rhythm. Heart sounds: Normal heart sounds. No murmur heard. Pulmonary:      Effort: Pulmonary effort is normal. No respiratory distress. Breath sounds: Normal breath sounds. No wheezing, rhonchi or rales. Abdominal:      General: Bowel sounds are normal. There is no distension. Palpations: Abdomen is soft. Tenderness: There is no abdominal tenderness. Genitourinary:     Comments: Defers NATY  Musculoskeletal:      Cervical back: Neck supple. Right lower leg: No edema. Left lower leg: No edema. Neurological:      General: No focal deficit present. Mental Status: He is alert and oriented to person, place, and time. Mental status is at baseline. Psychiatric:         Mood and Affect: Mood normal.         Behavior: Behavior normal.         Thought Content: Thought content normal.         Judgment: Judgment normal.       Tobacco Cessation Counseling: Tobacco cessation counseling and education was provided. The patient is sincerely urged to quit consumption of tobacco. Pt doesn't smoke, but chews only.

## 2024-02-21 DIAGNOSIS — E03.8 HYPOTHYROIDISM DUE TO HASHIMOTO'S THYROIDITIS: ICD-10-CM

## 2024-02-21 DIAGNOSIS — E06.3 HYPOTHYROIDISM DUE TO HASHIMOTO'S THYROIDITIS: ICD-10-CM

## 2024-02-21 RX ORDER — LEVOTHYROXINE SODIUM 0.2 MG/1
200 TABLET ORAL EVERY MORNING
Qty: 90 TABLET | Refills: 1 | Status: SHIPPED | OUTPATIENT
Start: 2024-02-21

## 2024-04-26 DIAGNOSIS — E06.3 HYPOTHYROIDISM DUE TO HASHIMOTO'S THYROIDITIS: ICD-10-CM

## 2024-04-26 DIAGNOSIS — E03.8 HYPOTHYROIDISM DUE TO HASHIMOTO'S THYROIDITIS: ICD-10-CM

## 2024-04-29 RX ORDER — LEVOTHYROXINE SODIUM 0.03 MG/1
TABLET ORAL
Qty: 30 TABLET | Refills: 0 | Status: SHIPPED | OUTPATIENT
Start: 2024-04-29

## 2024-04-30 ENCOUNTER — RA CDI HCC (OUTPATIENT)
Dept: OTHER | Facility: HOSPITAL | Age: 54
End: 2024-04-30

## 2024-04-30 PROBLEM — J45.20 MILD INTERMITTENT ASTHMA: Status: ACTIVE | Noted: 2024-04-30

## 2024-04-30 NOTE — PROGRESS NOTES
HCC coding opportunities            Chart Reviewed number of suggestions sent to Provider: 1  J45.20     Patients Insurance        Commercial Insurance: [employer]

## 2024-05-01 DIAGNOSIS — Z00.6 ENCOUNTER FOR EXAMINATION FOR NORMAL COMPARISON OR CONTROL IN CLINICAL RESEARCH PROGRAM: ICD-10-CM

## 2024-05-07 ENCOUNTER — APPOINTMENT (OUTPATIENT)
Dept: LAB | Facility: CLINIC | Age: 54
End: 2024-05-07
Payer: COMMERCIAL

## 2024-05-07 ENCOUNTER — OFFICE VISIT (OUTPATIENT)
Dept: INTERNAL MEDICINE CLINIC | Facility: CLINIC | Age: 54
End: 2024-05-07
Payer: COMMERCIAL

## 2024-05-07 VITALS
HEIGHT: 72 IN | DIASTOLIC BLOOD PRESSURE: 88 MMHG | TEMPERATURE: 96.9 F | SYSTOLIC BLOOD PRESSURE: 144 MMHG | HEART RATE: 77 BPM | WEIGHT: 248.25 LBS | BODY MASS INDEX: 33.62 KG/M2 | OXYGEN SATURATION: 99 %

## 2024-05-07 DIAGNOSIS — J30.9 ALLERGIC RHINITIS, UNSPECIFIED SEASONALITY, UNSPECIFIED TRIGGER: ICD-10-CM

## 2024-05-07 DIAGNOSIS — E78.2 MIXED HYPERLIPIDEMIA: ICD-10-CM

## 2024-05-07 DIAGNOSIS — E06.3 HYPOTHYROIDISM DUE TO HASHIMOTO'S THYROIDITIS: ICD-10-CM

## 2024-05-07 DIAGNOSIS — Z13.220 SCREENING FOR LIPID DISORDERS: ICD-10-CM

## 2024-05-07 DIAGNOSIS — D64.9 ANEMIA, UNSPECIFIED TYPE: ICD-10-CM

## 2024-05-07 DIAGNOSIS — Z00.6 ENCOUNTER FOR EXAMINATION FOR NORMAL COMPARISON OR CONTROL IN CLINICAL RESEARCH PROGRAM: ICD-10-CM

## 2024-05-07 DIAGNOSIS — Z13.0 SCREENING FOR DEFICIENCY ANEMIA: ICD-10-CM

## 2024-05-07 DIAGNOSIS — F41.1 GENERALIZED ANXIETY DISORDER: ICD-10-CM

## 2024-05-07 DIAGNOSIS — E06.3 HYPOTHYROIDISM DUE TO HASHIMOTO'S THYROIDITIS: Primary | ICD-10-CM

## 2024-05-07 DIAGNOSIS — R03.0 ELEVATED BP WITHOUT DIAGNOSIS OF HYPERTENSION: ICD-10-CM

## 2024-05-07 DIAGNOSIS — Z12.5 SCREENING FOR PROSTATE CANCER: ICD-10-CM

## 2024-05-07 DIAGNOSIS — E03.8 HYPOTHYROIDISM DUE TO HASHIMOTO'S THYROIDITIS: Primary | ICD-10-CM

## 2024-05-07 DIAGNOSIS — E03.8 HYPOTHYROIDISM DUE TO HASHIMOTO'S THYROIDITIS: ICD-10-CM

## 2024-05-07 DIAGNOSIS — Z23 ENCOUNTER FOR IMMUNIZATION: ICD-10-CM

## 2024-05-07 DIAGNOSIS — Z13.29 SCREENING FOR THYROID DISORDER: ICD-10-CM

## 2024-05-07 DIAGNOSIS — Z13.1 SCREENING FOR DIABETES MELLITUS (DM): ICD-10-CM

## 2024-05-07 DIAGNOSIS — Z28.89 IMMUNIZATION NOT CARRIED OUT FOR OTHER REASON: ICD-10-CM

## 2024-05-07 DIAGNOSIS — J45.20 MILD INTERMITTENT ASTHMA WITHOUT COMPLICATION: ICD-10-CM

## 2024-05-07 LAB
ALBUMIN SERPL BCP-MCNC: 4.4 G/DL (ref 3.5–5)
ALP SERPL-CCNC: 99 U/L (ref 34–104)
ALT SERPL W P-5'-P-CCNC: 39 U/L (ref 7–52)
ANION GAP SERPL CALCULATED.3IONS-SCNC: 9 MMOL/L (ref 4–13)
AST SERPL W P-5'-P-CCNC: 30 U/L (ref 13–39)
BASOPHILS # BLD AUTO: 0.07 THOUSANDS/ÂΜL (ref 0–0.1)
BASOPHILS NFR BLD AUTO: 1 % (ref 0–1)
BILIRUB SERPL-MCNC: 1.01 MG/DL (ref 0.2–1)
BUN SERPL-MCNC: 17 MG/DL (ref 5–25)
CALCIUM SERPL-MCNC: 9.6 MG/DL (ref 8.4–10.2)
CHLORIDE SERPL-SCNC: 100 MMOL/L (ref 96–108)
CHOLEST SERPL-MCNC: 224 MG/DL
CO2 SERPL-SCNC: 29 MMOL/L (ref 21–32)
CREAT SERPL-MCNC: 1.14 MG/DL (ref 0.6–1.3)
EOSINOPHIL # BLD AUTO: 0.18 THOUSAND/ÂΜL (ref 0–0.61)
EOSINOPHIL NFR BLD AUTO: 2 % (ref 0–6)
ERYTHROCYTE [DISTWIDTH] IN BLOOD BY AUTOMATED COUNT: 12.3 % (ref 11.6–15.1)
EST. AVERAGE GLUCOSE BLD GHB EST-MCNC: 123 MG/DL
GFR SERPL CREATININE-BSD FRML MDRD: 73 ML/MIN/1.73SQ M
GLUCOSE P FAST SERPL-MCNC: 101 MG/DL (ref 65–99)
HBA1C MFR BLD: 5.9 %
HCT VFR BLD AUTO: 47.6 % (ref 36.5–49.3)
HDLC SERPL-MCNC: 57 MG/DL
HGB BLD-MCNC: 15.3 G/DL (ref 12–17)
IMM GRANULOCYTES # BLD AUTO: 0.04 THOUSAND/UL (ref 0–0.2)
IMM GRANULOCYTES NFR BLD AUTO: 1 % (ref 0–2)
LDLC SERPL CALC-MCNC: 96 MG/DL (ref 0–100)
LDLC SERPL DIRECT ASSAY-MCNC: 133 MG/DL (ref 0–100)
LYMPHOCYTES # BLD AUTO: 1.97 THOUSANDS/ÂΜL (ref 0.6–4.47)
LYMPHOCYTES NFR BLD AUTO: 25 % (ref 14–44)
MCH RBC QN AUTO: 28.7 PG (ref 26.8–34.3)
MCHC RBC AUTO-ENTMCNC: 32.1 G/DL (ref 31.4–37.4)
MCV RBC AUTO: 89 FL (ref 82–98)
MONOCYTES # BLD AUTO: 0.48 THOUSAND/ÂΜL (ref 0.17–1.22)
MONOCYTES NFR BLD AUTO: 6 % (ref 4–12)
NEUTROPHILS # BLD AUTO: 5.17 THOUSANDS/ÂΜL (ref 1.85–7.62)
NEUTS SEG NFR BLD AUTO: 65 % (ref 43–75)
NRBC BLD AUTO-RTO: 0 /100 WBCS
PLATELET # BLD AUTO: 265 THOUSANDS/UL (ref 149–390)
PMV BLD AUTO: 11.5 FL (ref 8.9–12.7)
POTASSIUM SERPL-SCNC: 4.3 MMOL/L (ref 3.5–5.3)
PROT SERPL-MCNC: 7.6 G/DL (ref 6.4–8.4)
PSA SERPL-MCNC: 0.6 NG/ML (ref 0–4)
RBC # BLD AUTO: 5.34 MILLION/UL (ref 3.88–5.62)
SODIUM SERPL-SCNC: 138 MMOL/L (ref 135–147)
TRIGL SERPL-MCNC: 357 MG/DL
TSH SERPL DL<=0.05 MIU/L-ACNC: 0.01 UIU/ML (ref 0.45–4.5)
WBC # BLD AUTO: 7.91 THOUSAND/UL (ref 4.31–10.16)

## 2024-05-07 PROCEDURE — 99214 OFFICE O/P EST MOD 30 MIN: CPT | Performed by: INTERNAL MEDICINE

## 2024-05-07 PROCEDURE — 85025 COMPLETE CBC W/AUTO DIFF WBC: CPT

## 2024-05-07 PROCEDURE — 80061 LIPID PANEL: CPT

## 2024-05-07 PROCEDURE — 80053 COMPREHEN METABOLIC PANEL: CPT

## 2024-05-07 PROCEDURE — 86787 VARICELLA-ZOSTER ANTIBODY: CPT

## 2024-05-07 PROCEDURE — G0103 PSA SCREENING: HCPCS

## 2024-05-07 PROCEDURE — 36415 COLL VENOUS BLD VENIPUNCTURE: CPT

## 2024-05-07 PROCEDURE — 83036 HEMOGLOBIN GLYCOSYLATED A1C: CPT

## 2024-05-07 PROCEDURE — 83721 ASSAY OF BLOOD LIPOPROTEIN: CPT

## 2024-05-07 PROCEDURE — 84443 ASSAY THYROID STIM HORMONE: CPT

## 2024-05-07 NOTE — PATIENT INSTRUCTIONS
Hyperlipidemia   WHAT YOU NEED TO KNOW:   What is hyperlipidemia?  Hyperlipidemia is a high level of lipids (fats) in your blood. These lipids include cholesterol or triglycerides. Lipids are made by your body. They also come from the foods you eat. Your body needs lipids to work properly, but high levels increase your risk for heart disease, heart attack, and stroke.  What increases my risk for hyperlipidemia?   Family history of high lipid levels    Diet high in saturated fats, cholesterol, or calories    High alcohol intake or smoking    Lack of regular physical activity    Medical conditions such as hypothyroidism, obesity, or type 2 diabetes    Certain medicines, such as blood pressure medicines, hormones, and steroids    How is hyperlipidemia managed and treated?  Your healthcare provider may first recommend that you make lifestyle changes to help decrease your lipid levels. Your provider may recommend you work with a team to manage hyperlipidemia. The team may include medical experts such as a dietitian, an exercise or physical therapist, and a behavior therapist. Your family members may be included in helping you create lifestyle changes. You may also need to take medicine to lower your lipid levels. Some of the lifestyle changes you may need to make include the following:  Maintain a healthy weight.  Ask your healthcare provider what a healthy weight is for you. Ask your provider to help you create a weight loss plan, if needed. Weight loss can decrease your cholesterol and triglyceride levels.    Be physically active throughout the day.  Physical activity, such as exercise, lowers your cholesterol levels and helps you maintain a healthy weight. Get 30 minutes or more of aerobic exercise 4 to 6 days each week. You can split your exercise into four 10-minute workouts instead of 30 minutes at one time. Examples of aerobic exercises include walking briskly, swimming, or riding a bike. Work with your healthcare  provider to plan the best exercise program for you. Also include strength training at least 2 times each week. Your healthcare providers can help you create a physical activity plan.            Do not smoke.  Nicotine and other chemicals in cigarettes and cigars can increase your risk for a heart attack and stroke. Ask your healthcare provider for information if you currently smoke and need help to quit. E-cigarettes or smokeless tobacco still contain nicotine. Talk to your healthcare provider before you use these products.         Eat heart-healthy foods.  A dietitian or your provider can give you more information on low-sodium plans or the DASH (Dietary Approaches to Stop Hypertension) eating plan. The DASH plan is low in sodium, processed sugar, unhealthy fats, and total fat. It is high in potassium, calcium, and fiber. It is high in potassium, calcium, and fiber. These can be found in vegetables, fruit, and whole-grain foods. The following are ways to get more heart-healthy foods:         Decrease the total amount of fat you eat.  Choose lean meats, fat-free or 1% fat milk, and low-fat dairy products, such as yogurt and cheese. Limit or do not eat red meat. Red meats are high in fat and cholesterol.    Replace unhealthy fats with healthy fats.  Unhealthy fats include saturated fat, trans fat, and cholesterol. Choose soft margarines that are low in saturated fat and have little or no trans fat. Monounsaturated fats are healthy fats. These are found in olive oil, canola oil, avocado, and nuts. Polyunsaturated fats are also healthy. These are found in fish, flaxseed, walnuts, and soybeans.    Eat 5 or more servings of fruits and vegetables every day.  They are low in calories and fat, and a good source of essential vitamins. Include dark green, red, and orange vegetables. Examples include spinach, kale, broccoli, and carrots.    Eat foods high in fiber.  Fiber can help lower your cholesterol levels. Choose whole  grain, high-fiber foods. Good choices include whole-wheat breads or cereals, beans, peas, fruits, and vegetables.         Limit sodium (salt) as directed.  Too much sodium can affect your fluid balance and blood pressure. Your healthcare provider will tell you how much sodium and potassium are safe for you to have in a day. Your provider may recommend that you limit sodium to 2,300 mg a day. Your provider or a dietitian can help you find ways to limit sodium. For example, if you add salt while you cook, do not add more salt at the table. Check labels to find low-sodium or no-salt-added foods. Some low-sodium foods use potassium salts for flavor. Too much potassium can also cause health problems.       Ask your healthcare provider if it is okay for you to drink alcohol.  Alcohol can increase your cholesterol and triglyceride levels. Your provider can tell you how many drinks are okay to have within 24 hours and within 1 week. A drink of alcohol is 12 ounces of beer, 5 ounces of wine, or 1½ ounces of liquor.    Call your local emergency number (911 in the ) or have someone call if:   You have any of the following signs of a heart attack:      Squeezing, pressure, or pain in your chest    You may  also have any of the following:     Discomfort or pain in your back, neck, jaw, stomach, or arm    Shortness of breath    Nausea or vomiting    Lightheadedness or a sudden cold sweat    You have any of the following signs of a stroke:      Numbness or drooping on one side of your face     Weakness in an arm or leg    Confusion or difficulty speaking    Dizziness, a severe headache, or vision loss    When should I call my doctor?   You have questions or concerns about your condition or care.      CARE AGREEMENT:   You have the right to help plan your care. Learn about your health condition and how it may be treated. Discuss treatment options with your healthcare providers to decide what care you want to receive. You always  have the right to refuse treatment. The above information is an  only. It is not intended as medical advice for individual conditions or treatments. Talk to your doctor, nurse or pharmacist before following any medical regimen to see if it is safe and effective for you.  © Copyright [employer] 2023 Information is for End User's use only and may not be sold, redistributed or otherwise used for commercial purposes.

## 2024-05-07 NOTE — PROGRESS NOTES
Depression Screening and Follow-up Plan: Patient was screened for depression during today's encounter. They screened negative with a PHQ-2 score of 0.    Tobacco Cessation Counseling: Tobacco cessation counseling was provided. The patient is sincerely urged to quit consumption of tobacco. He is ready to quit tobacco. Medication options and side effects of medication not discussed. Patient refused medication. Doesn't smoke, chews    Assessment/Plan:  Problem List Items Addressed This Visit          Respiratory    Mild intermittent asthma    Allergic rhinitis       Endocrine    Hypothyroidism - Primary    Relevant Orders    TSH, 3rd generation       Behavioral Health    Generalized anxiety disorder    Relevant Orders    TSH, 3rd generation       Blood    Anemia    Relevant Orders    CBC and differential       Other    Hyperlipidemia    Relevant Orders    Comprehensive metabolic panel    Lipid Panel with Direct LDL reflex     Other Visit Diagnoses       Encounter for immunization        Relevant Orders    Zoster Vaccine Recombinant IM    Screening for prostate cancer        Relevant Orders    PSA, Total Screen    Screening for diabetes mellitus (DM)        Relevant Orders    Hemoglobin A1C    Screening for thyroid disorder        Screening for lipid disorders        Screening for deficiency anemia        Elevated BP without diagnosis of hypertension        Immunization not carried out for other reason        Relevant Orders    Varicella zoster antibody, IgG             Diagnoses and all orders for this visit:    Hypothyroidism due to Hashimoto's thyroiditis  -     TSH, 3rd generation; Future    Encounter for immunization  -     Zoster Vaccine Recombinant IM    Mixed hyperlipidemia  -     Comprehensive metabolic panel; Future  -     Lipid Panel with Direct LDL reflex; Future    Anemia, unspecified type  -     CBC and differential; Future    Generalized anxiety disorder  -     TSH, 3rd generation; Future    Mild  intermittent asthma without complication    Allergic rhinitis, unspecified seasonality, unspecified trigger    Screening for prostate cancer  -     PSA, Total Screen; Future    Screening for diabetes mellitus (DM)  -     Hemoglobin A1C; Future    Screening for thyroid disorder    Screening for lipid disorders    Screening for deficiency anemia    Elevated BP without diagnosis of hypertension    Immunization not carried out for other reason  -     Varicella zoster antibody, IgG; Future        No problem-specific Assessment & Plan notes found for this encounter.    A/P: Doing ok and will check labs. BP is up, but is usually good. Will have pt check as an OP.  Discussed vaccines pt reports that he never had chickenpox is uncertain whether he should get the vaccine. Discussed options and pt ok with checking varicella antibody and see if he qualifies for the vaccine. . Continue current treatment and RTC six months for routine.     Subjective:      Patient ID: Douglas Kemp is a 53 y.o. male.    WM RTC for f/u HLD, hypothyroidism, etc. Doing ok and no new issues. Remains active w/o difficulty and no falls. Asthma is controlled an limited rescue MDI use. AZALEA is controlled. Due for labs and vaccines.         The following portions of the patient's history were reviewed and updated as appropriate:   He has a past medical history of Anemia, Asthma, Chronic kidney disease, Disease of thyroid gland, Generalized anxiety disorder, Kidney stone, and Lyme disease.,  does not have any pertinent problems on file.,   has a past surgical history that includes Knee arthroscopy.,  family history includes Cancer in his father; No Known Problems in his mother.,   reports that he has never smoked. He has never been exposed to tobacco smoke. His smokeless tobacco use includes chew. He reports current alcohol use of about 8.0 standard drinks of alcohol per week. He reports that he does not use drugs.,  has No Known  Allergies..  Current Outpatient Medications   Medication Sig Dispense Refill    clotrimazole-betamethasone (LOTRISONE) 1-0.05 % cream Apply topically 2 (two) times a day 45 g 3    fluticasone (FLONASE) 50 mcg/act nasal spray 1 spray into each nostril daily (Patient taking differently: 1 spray into each nostril if needed) 16 g 0    ibuprofen (MOTRIN) 200 mg tablet Take 200 mg by mouth if needed for mild pain      levothyroxine 200 mcg tablet TAKE 1 TABLET DAILY IN THE MORNING... 90 tablet 1    levothyroxine 25 mcg tablet TAKE 1 TABLET DAILY IN THE EARLY MORNING 30 tablet 0    meloxicam (MOBIC) 7.5 mg tablet Take 1 tablet (7.5 mg total) by mouth daily 30 tablet 1     No current facility-administered medications for this visit.       Review of Systems   Constitutional:  Negative for activity change, chills, diaphoresis, fatigue and fever.   HENT: Negative.     Eyes:  Negative for visual disturbance.   Respiratory:  Negative for cough, chest tightness, shortness of breath and wheezing.    Cardiovascular:  Negative for chest pain, palpitations and leg swelling.   Gastrointestinal:  Negative for abdominal pain, constipation, diarrhea, nausea and vomiting.   Endocrine: Negative for cold intolerance and heat intolerance.   Genitourinary:  Negative for difficulty urinating, dysuria and frequency.   Musculoskeletal:  Negative for arthralgias, gait problem and myalgias.   Neurological:  Negative for dizziness, seizures, syncope, weakness, light-headedness and headaches.   Psychiatric/Behavioral:  Negative for confusion, dysphoric mood and sleep disturbance. The patient is not nervous/anxious.        PHQ-2/9 Depression Screening    Little interest or pleasure in doing things: 0 - not at all  Feeling down, depressed, or hopeless: 0 - not at all  PHQ-2 Score: 0  PHQ-2 Interpretation: Negative depression screen        Objective:  Vitals:    05/07/24 1318   BP: 144/88   Pulse: 77   Temp: (!) 96.9 °F (36.1 °C)   SpO2: 99%   Weight:  113 kg (248 lb 4 oz)   Height: 6' (1.829 m)     Body mass index is 33.67 kg/m².     Physical Exam  Vitals and nursing note reviewed.   Constitutional:       General: He is not in acute distress.     Appearance: Normal appearance. He is not ill-appearing.   HENT:      Head: Normocephalic and atraumatic.      Mouth/Throat:      Mouth: Mucous membranes are moist.   Eyes:      Extraocular Movements: Extraocular movements intact.      Conjunctiva/sclera: Conjunctivae normal.      Pupils: Pupils are equal, round, and reactive to light.   Neck:      Vascular: No carotid bruit.   Cardiovascular:      Rate and Rhythm: Normal rate and regular rhythm.      Heart sounds: Normal heart sounds. No murmur heard.  Pulmonary:      Effort: Pulmonary effort is normal. No respiratory distress.      Breath sounds: Normal breath sounds. No wheezing, rhonchi or rales.   Abdominal:      General: Bowel sounds are normal. There is no distension.      Palpations: Abdomen is soft.      Tenderness: There is no abdominal tenderness.   Musculoskeletal:      Cervical back: Neck supple.      Right lower leg: No edema.      Left lower leg: No edema.   Neurological:      General: No focal deficit present.      Mental Status: He is alert and oriented to person, place, and time. Mental status is at baseline.   Psychiatric:         Mood and Affect: Mood normal.         Behavior: Behavior normal.         Thought Content: Thought content normal.         Judgment: Judgment normal.

## 2024-05-08 ENCOUNTER — TELEPHONE (OUTPATIENT)
Dept: INTERNAL MEDICINE CLINIC | Facility: CLINIC | Age: 54
End: 2024-05-08

## 2024-05-08 DIAGNOSIS — E78.2 MIXED HYPERLIPIDEMIA: Primary | ICD-10-CM

## 2024-05-08 LAB — VZV IGG SER QL IA: NORMAL

## 2024-05-08 RX ORDER — ICOSAPENT ETHYL 1 G/1
2 CAPSULE ORAL 2 TIMES DAILY
Qty: 360 CAPSULE | Refills: 3 | Status: SHIPPED | OUTPATIENT
Start: 2024-05-08

## 2024-05-08 NOTE — TELEPHONE ENCOUNTER
----- Message from Alireza Holley DO sent at 5/8/2024 10:03 AM EDT -----  Sent    ----- Message -----  From: Holli Haq  Sent: 5/8/2024   9:19 AM EDT  To: Alireza Holley DO    I spoke to patient and informed. He is okay to get meds for TG  ----- Message -----  From: Alireza Holley DO  Sent: 5/8/2024   6:14 AM EDT  To: [facility]    Call pt labs ok except for low TSH, elevated TG and LDL. See if pt wants meds for TG. Watch diet for LDL. Recommend only taking levothyroxine 200mcg  a day. Repeat TSH in six weeks.

## 2024-05-08 NOTE — TELEPHONE ENCOUNTER
----- Message from Alireza Holley DO sent at 5/8/2024  8:31 AM EDT -----  Call pt, has antibodies to chickenpox. Should consider the shingles vaccine

## 2024-05-19 LAB
APOB+LDLR+PCSK9 GENE MUT ANL BLD/T: NOT DETECTED
BRCA1+BRCA2 DEL+DUP + FULL MUT ANL BLD/T: NOT DETECTED
MLH1+MSH2+MSH6+PMS2 GN DEL+DUP+FUL M: NOT DETECTED

## 2024-07-17 LAB — COLOGUARD RESULT REPORTABLE: NEGATIVE

## 2024-08-14 DIAGNOSIS — E03.8 HYPOTHYROIDISM DUE TO HASHIMOTO'S THYROIDITIS: ICD-10-CM

## 2024-08-14 DIAGNOSIS — E06.3 HYPOTHYROIDISM DUE TO HASHIMOTO'S THYROIDITIS: ICD-10-CM

## 2024-08-14 RX ORDER — LEVOTHYROXINE SODIUM 200 UG/1
200 TABLET ORAL EVERY MORNING
Qty: 90 TABLET | Refills: 1 | Status: SHIPPED | OUTPATIENT
Start: 2024-08-14

## 2024-08-28 ENCOUNTER — VBI (OUTPATIENT)
Dept: ADMINISTRATIVE | Facility: OTHER | Age: 54
End: 2024-08-28

## 2024-08-28 NOTE — TELEPHONE ENCOUNTER
08/28/24 3:15 PM     Chart reviewed for CRC: Cologuard was/were submitted to the patient's insurance.     Bernie Oconnell MA   [facility]

## 2024-11-15 ENCOUNTER — APPOINTMENT (OUTPATIENT)
Dept: LAB | Facility: CLINIC | Age: 54
End: 2024-11-15
Payer: COMMERCIAL

## 2024-11-15 ENCOUNTER — OFFICE VISIT (OUTPATIENT)
Dept: INTERNAL MEDICINE CLINIC | Facility: CLINIC | Age: 54
End: 2024-11-15
Payer: COMMERCIAL

## 2024-11-15 VITALS
HEIGHT: 72 IN | WEIGHT: 253 LBS | SYSTOLIC BLOOD PRESSURE: 136 MMHG | HEART RATE: 72 BPM | OXYGEN SATURATION: 98 % | DIASTOLIC BLOOD PRESSURE: 84 MMHG | TEMPERATURE: 97.9 F | BODY MASS INDEX: 34.27 KG/M2

## 2024-11-15 DIAGNOSIS — G47.9 SLEEP DISTURBANCES: ICD-10-CM

## 2024-11-15 DIAGNOSIS — Z23 ENCOUNTER FOR IMMUNIZATION: ICD-10-CM

## 2024-11-15 DIAGNOSIS — Z00.00 WELL ADULT EXAM: Primary | ICD-10-CM

## 2024-11-15 DIAGNOSIS — J45.20 MILD INTERMITTENT ASTHMA WITHOUT COMPLICATION: ICD-10-CM

## 2024-11-15 DIAGNOSIS — Z00.00 WELL ADULT EXAM: ICD-10-CM

## 2024-11-15 DIAGNOSIS — E78.2 MIXED HYPERLIPIDEMIA: ICD-10-CM

## 2024-11-15 DIAGNOSIS — E06.3 HYPOTHYROIDISM DUE TO HASHIMOTO THYROIDITIS: ICD-10-CM

## 2024-11-15 DIAGNOSIS — D64.9 ANEMIA, UNSPECIFIED TYPE: ICD-10-CM

## 2024-11-15 DIAGNOSIS — F41.1 GENERALIZED ANXIETY DISORDER: ICD-10-CM

## 2024-11-15 LAB
ALBUMIN SERPL BCG-MCNC: 4.4 G/DL (ref 3.5–5)
ALP SERPL-CCNC: 84 U/L (ref 34–104)
ALT SERPL W P-5'-P-CCNC: 27 U/L (ref 7–52)
ANION GAP SERPL CALCULATED.3IONS-SCNC: 7 MMOL/L (ref 4–13)
AST SERPL W P-5'-P-CCNC: 23 U/L (ref 13–39)
BILIRUB SERPL-MCNC: 0.91 MG/DL (ref 0.2–1)
BUN SERPL-MCNC: 18 MG/DL (ref 5–25)
CALCIUM SERPL-MCNC: 9.2 MG/DL (ref 8.4–10.2)
CHLORIDE SERPL-SCNC: 104 MMOL/L (ref 96–108)
CO2 SERPL-SCNC: 30 MMOL/L (ref 21–32)
CREAT SERPL-MCNC: 1.12 MG/DL (ref 0.6–1.3)
GFR SERPL CREATININE-BSD FRML MDRD: 74 ML/MIN/1.73SQ M
GLUCOSE P FAST SERPL-MCNC: 106 MG/DL (ref 65–99)
LDLC SERPL DIRECT ASSAY-MCNC: 130 MG/DL (ref 0–100)
POTASSIUM SERPL-SCNC: 4.5 MMOL/L (ref 3.5–5.3)
PROT SERPL-MCNC: 7.2 G/DL (ref 6.4–8.4)
SODIUM SERPL-SCNC: 141 MMOL/L (ref 135–147)
TRIGL SERPL-MCNC: 262 MG/DL (ref ?–150)
TSH SERPL DL<=0.05 MIU/L-ACNC: 0.09 UIU/ML (ref 0.45–4.5)

## 2024-11-15 PROCEDURE — 90750 HZV VACC RECOMBINANT IM: CPT

## 2024-11-15 PROCEDURE — 99396 PREV VISIT EST AGE 40-64: CPT | Performed by: INTERNAL MEDICINE

## 2024-11-15 PROCEDURE — 84478 ASSAY OF TRIGLYCERIDES: CPT

## 2024-11-15 PROCEDURE — 80053 COMPREHEN METABOLIC PANEL: CPT

## 2024-11-15 PROCEDURE — 83721 ASSAY OF BLOOD LIPOPROTEIN: CPT

## 2024-11-15 PROCEDURE — 36415 COLL VENOUS BLD VENIPUNCTURE: CPT

## 2024-11-15 PROCEDURE — 90471 IMMUNIZATION ADMIN: CPT

## 2024-11-15 PROCEDURE — 84443 ASSAY THYROID STIM HORMONE: CPT

## 2024-11-15 NOTE — PROGRESS NOTES
Name: Douglas Kemp      : 1970      MRN: [id number]  Encounter Provider: Alireza Holley DO  Encounter Date: 11/15/2024   Encounter department: [facility]  :  Assessment & Plan  Encounter for immunization    Orders:    Zoster Vaccine Recombinant IM    Well adult exam    Orders:    Comprehensive metabolic panel; Future    LDL cholesterol, direct; Future    Triglycerides; Future    TSH, 3rd generation; Future    Mild intermittent asthma without complication         Hypothyroidism due to Hashimoto thyroiditis    Orders:    TSH, 3rd generation; Future    Generalized anxiety disorder         Anemia, unspecified type         Sleep disturbances         Mixed hyperlipidemia    Orders:    Comprehensive metabolic panel; Future    LDL cholesterol, direct; Future    Triglycerides; Future      A/P: Doing well and co-morbidities are stable.  Labs will be ordered. Discussed vaccines defers.. No mental or physical restrictions. No evidence of communicable diseases. Continue current treatment and RTC one year for annual and six months for routine. .        History of Present Illness     WM presents for a well exam. Doing ok and no c/o's. Asthma is controlled and limited rescue med use. Chronic pain is manageable. Remains active w/o difficulty and no falls. Denies depression and AZALEA is controlled. . No suicidal or violent ideations. Working full time. No recent travel. No fever, chills, or sweats. No unexplained wt change. Denies CP, SOB, palpitations, edema, orthopnea, or PND. No sz or syncope. No changes in bowel or bladder habits. No trouble swallowing. Appetite and sleep are good. Sees both a DDS and an eye doctor. No change in PMH, PSH, ALL, OH, SH, or Fhx. Currently due for labs and vaccines. .            Review of Systems   Constitutional:  Negative for activity change, chills, diaphoresis, fatigue and fever.   HENT: Negative.     Eyes:  Negative for visual disturbance.   Respiratory:   Negative for cough, chest tightness, shortness of breath and wheezing.    Cardiovascular:  Negative for chest pain, palpitations and leg swelling.   Gastrointestinal:  Negative for abdominal pain, constipation, diarrhea, nausea and vomiting.   Endocrine: Negative for cold intolerance and heat intolerance.   Genitourinary:  Negative for difficulty urinating, dysuria and frequency.   Musculoskeletal:  Negative for arthralgias, gait problem and myalgias.   Neurological:  Negative for dizziness, seizures, syncope, weakness, light-headedness and headaches.   Psychiatric/Behavioral:  Negative for confusion, dysphoric mood and sleep disturbance. The patient is not nervous/anxious.           Objective   There were no vitals taken for this visit.     Physical Exam  Vitals and nursing note reviewed.   Constitutional:       General: He is not in acute distress.     Appearance: Normal appearance. He is not ill-appearing.   HENT:      Head: Normocephalic and atraumatic.      Mouth/Throat:      Mouth: Mucous membranes are moist.   Eyes:      Extraocular Movements: Extraocular movements intact.      Conjunctiva/sclera: Conjunctivae normal.      Pupils: Pupils are equal, round, and reactive to light.   Neck:      Vascular: No carotid bruit.   Cardiovascular:      Rate and Rhythm: Normal rate and regular rhythm.      Heart sounds: Normal heart sounds. No murmur heard.  Pulmonary:      Effort: Pulmonary effort is normal. No respiratory distress.      Breath sounds: Normal breath sounds. No wheezing, rhonchi or rales.   Abdominal:      General: Bowel sounds are normal. There is no distension.      Palpations: Abdomen is soft.      Tenderness: There is no abdominal tenderness.   Musculoskeletal:      Cervical back: Neck supple.      Right lower leg: No edema.      Left lower leg: No edema.   Neurological:      General: No focal deficit present.      Mental Status: He is alert and oriented to person, place, and time. Mental status is at  baseline.   Psychiatric:         Mood and Affect: Mood normal.         Behavior: Behavior normal.         Thought Content: Thought content normal.         Judgment: Judgment normal.

## 2024-11-16 ENCOUNTER — RESULTS FOLLOW-UP (OUTPATIENT)
Dept: INTERNAL MEDICINE CLINIC | Facility: CLINIC | Age: 54
End: 2024-11-16

## 2024-11-16 DIAGNOSIS — E06.3 HYPOTHYROIDISM DUE TO HASHIMOTO THYROIDITIS: Primary | ICD-10-CM

## 2024-11-16 RX ORDER — LEVOTHYROXINE SODIUM 175 UG/1
175 TABLET ORAL
Qty: 100 TABLET | Refills: 3 | Status: SHIPPED | OUTPATIENT
Start: 2024-11-16

## 2024-11-18 NOTE — TELEPHONE ENCOUNTER
Pt called and was given results of his labs verbalized understanding, but with the TG he thought he was on something for that? He is currently on vascepa, he wanted to know if something else would then be added?

## 2025-05-09 ENCOUNTER — RA CDI HCC (OUTPATIENT)
Dept: OTHER | Facility: HOSPITAL | Age: 55
End: 2025-05-09

## 2025-05-16 ENCOUNTER — RESULTS FOLLOW-UP (OUTPATIENT)
Dept: INTERNAL MEDICINE CLINIC | Facility: CLINIC | Age: 55
End: 2025-05-16

## 2025-05-16 ENCOUNTER — OFFICE VISIT (OUTPATIENT)
Dept: INTERNAL MEDICINE CLINIC | Facility: CLINIC | Age: 55
End: 2025-05-16
Payer: COMMERCIAL

## 2025-05-16 ENCOUNTER — APPOINTMENT (OUTPATIENT)
Dept: LAB | Facility: CLINIC | Age: 55
End: 2025-05-16
Attending: INTERNAL MEDICINE
Payer: COMMERCIAL

## 2025-05-16 VITALS
BODY MASS INDEX: 34.27 KG/M2 | WEIGHT: 253 LBS | HEIGHT: 72 IN | DIASTOLIC BLOOD PRESSURE: 72 MMHG | SYSTOLIC BLOOD PRESSURE: 128 MMHG | HEART RATE: 70 BPM | OXYGEN SATURATION: 98 %

## 2025-05-16 DIAGNOSIS — Z13.29 SCREENING FOR THYROID DISORDER: ICD-10-CM

## 2025-05-16 DIAGNOSIS — D64.9 ANEMIA, UNSPECIFIED TYPE: ICD-10-CM

## 2025-05-16 DIAGNOSIS — E06.3 HYPOTHYROIDISM DUE TO HASHIMOTO THYROIDITIS: ICD-10-CM

## 2025-05-16 DIAGNOSIS — J45.20 MILD INTERMITTENT ASTHMA WITHOUT COMPLICATION: ICD-10-CM

## 2025-05-16 DIAGNOSIS — F41.1 GENERALIZED ANXIETY DISORDER: ICD-10-CM

## 2025-05-16 DIAGNOSIS — E78.2 MIXED HYPERLIPIDEMIA: ICD-10-CM

## 2025-05-16 DIAGNOSIS — Z13.0 SCREENING FOR DEFICIENCY ANEMIA: ICD-10-CM

## 2025-05-16 DIAGNOSIS — E78.2 MIXED HYPERLIPIDEMIA: Primary | ICD-10-CM

## 2025-05-16 DIAGNOSIS — Z13.220 SCREENING FOR HYPERLIPIDEMIA: ICD-10-CM

## 2025-05-16 DIAGNOSIS — Z13.1 SCREENING FOR DIABETES MELLITUS: ICD-10-CM

## 2025-05-16 DIAGNOSIS — Z23 ENCOUNTER FOR IMMUNIZATION: ICD-10-CM

## 2025-05-16 DIAGNOSIS — E06.3 HYPOTHYROIDISM DUE TO HASHIMOTO'S THYROIDITIS: Primary | ICD-10-CM

## 2025-05-16 DIAGNOSIS — R79.89 ELEVATED SERUM CREATININE: ICD-10-CM

## 2025-05-16 DIAGNOSIS — Z12.5 SCREENING FOR PROSTATE CANCER: ICD-10-CM

## 2025-05-16 LAB
ALBUMIN SERPL BCG-MCNC: 4.3 G/DL (ref 3.5–5)
ALP SERPL-CCNC: 105 U/L (ref 34–104)
ALT SERPL W P-5'-P-CCNC: 31 U/L (ref 7–52)
ANION GAP SERPL CALCULATED.3IONS-SCNC: 10 MMOL/L (ref 4–13)
AST SERPL W P-5'-P-CCNC: 26 U/L (ref 13–39)
BASOPHILS # BLD AUTO: 0.05 THOUSANDS/ÂΜL (ref 0–0.1)
BASOPHILS NFR BLD AUTO: 1 % (ref 0–1)
BILIRUB SERPL-MCNC: 0.63 MG/DL (ref 0.2–1)
BUN SERPL-MCNC: 15 MG/DL (ref 5–25)
CALCIUM SERPL-MCNC: 9.4 MG/DL (ref 8.4–10.2)
CHLORIDE SERPL-SCNC: 102 MMOL/L (ref 96–108)
CHOLEST SERPL-MCNC: 237 MG/DL (ref ?–200)
CO2 SERPL-SCNC: 27 MMOL/L (ref 21–32)
CREAT SERPL-MCNC: 1.33 MG/DL (ref 0.6–1.3)
EOSINOPHIL # BLD AUTO: 0.21 THOUSAND/ÂΜL (ref 0–0.61)
EOSINOPHIL NFR BLD AUTO: 3 % (ref 0–6)
ERYTHROCYTE [DISTWIDTH] IN BLOOD BY AUTOMATED COUNT: 12.8 % (ref 11.6–15.1)
EST. AVERAGE GLUCOSE BLD GHB EST-MCNC: 120 MG/DL
GFR SERPL CREATININE-BSD FRML MDRD: 60 ML/MIN/1.73SQ M
GLUCOSE P FAST SERPL-MCNC: 99 MG/DL (ref 65–99)
HBA1C MFR BLD: 5.8 %
HCT VFR BLD AUTO: 48 % (ref 36.5–49.3)
HDLC SERPL-MCNC: 57 MG/DL
HGB BLD-MCNC: 15.2 G/DL (ref 12–17)
IMM GRANULOCYTES # BLD AUTO: 0.03 THOUSAND/UL (ref 0–0.2)
IMM GRANULOCYTES NFR BLD AUTO: 0 % (ref 0–2)
LDLC SERPL CALC-MCNC: 140 MG/DL (ref 0–100)
LYMPHOCYTES # BLD AUTO: 1.67 THOUSANDS/ÂΜL (ref 0.6–4.47)
LYMPHOCYTES NFR BLD AUTO: 25 % (ref 14–44)
MCH RBC QN AUTO: 29.4 PG (ref 26.8–34.3)
MCHC RBC AUTO-ENTMCNC: 31.7 G/DL (ref 31.4–37.4)
MCV RBC AUTO: 93 FL (ref 82–98)
MONOCYTES # BLD AUTO: 0.44 THOUSAND/ÂΜL (ref 0.17–1.22)
MONOCYTES NFR BLD AUTO: 7 % (ref 4–12)
NEUTROPHILS # BLD AUTO: 4.38 THOUSANDS/ÂΜL (ref 1.85–7.62)
NEUTS SEG NFR BLD AUTO: 64 % (ref 43–75)
NRBC BLD AUTO-RTO: 0 /100 WBCS
PLATELET # BLD AUTO: 239 THOUSANDS/UL (ref 149–390)
PMV BLD AUTO: 12 FL (ref 8.9–12.7)
POTASSIUM SERPL-SCNC: 4.6 MMOL/L (ref 3.5–5.3)
PROT SERPL-MCNC: 7.4 G/DL (ref 6.4–8.4)
PSA SERPL-MCNC: 0.74 NG/ML (ref 0–4)
RBC # BLD AUTO: 5.17 MILLION/UL (ref 3.88–5.62)
SODIUM SERPL-SCNC: 139 MMOL/L (ref 135–147)
TRIGL SERPL-MCNC: 199 MG/DL (ref ?–150)
TSH SERPL DL<=0.05 MIU/L-ACNC: 5.23 UIU/ML (ref 0.45–4.5)
WBC # BLD AUTO: 6.78 THOUSAND/UL (ref 4.31–10.16)

## 2025-05-16 PROCEDURE — G0103 PSA SCREENING: HCPCS

## 2025-05-16 PROCEDURE — 80061 LIPID PANEL: CPT

## 2025-05-16 PROCEDURE — 80053 COMPREHEN METABOLIC PANEL: CPT

## 2025-05-16 PROCEDURE — 90750 HZV VACC RECOMBINANT IM: CPT

## 2025-05-16 PROCEDURE — 99214 OFFICE O/P EST MOD 30 MIN: CPT | Performed by: INTERNAL MEDICINE

## 2025-05-16 PROCEDURE — 36415 COLL VENOUS BLD VENIPUNCTURE: CPT

## 2025-05-16 PROCEDURE — 85025 COMPLETE CBC W/AUTO DIFF WBC: CPT

## 2025-05-16 PROCEDURE — 90471 IMMUNIZATION ADMIN: CPT

## 2025-05-16 PROCEDURE — 83036 HEMOGLOBIN GLYCOSYLATED A1C: CPT

## 2025-05-16 PROCEDURE — 84443 ASSAY THYROID STIM HORMONE: CPT

## 2025-05-16 RX ORDER — LEVOTHYROXINE SODIUM 200 UG/1
200 TABLET ORAL
Qty: 100 TABLET | Refills: 3 | Status: SHIPPED | OUTPATIENT
Start: 2025-05-16

## 2025-05-16 NOTE — PROGRESS NOTES
Name: Douglas Kemp      : 1970      MRN: [id number]  Encounter Provider: Alireza Holley DO  Encounter Date: 2025   Encounter department: [facility]  :  Assessment & Plan  Encounter for immunization    Orders:  •  Zoster Vaccine Recombinant IM    Mixed hyperlipidemia    Orders:  •  Comprehensive metabolic panel; Future    Hypothyroidism due to Hashimoto thyroiditis    Orders:  •  TSH, 3rd generation; Future    Mild intermittent asthma without complication         Generalized anxiety disorder         Anemia, unspecified type        Orders:  •  CBC and differential; Future    Screening for diabetes mellitus    Orders:  •  Hemoglobin A1C; Future    Screening for thyroid disorder    Orders:  •  TSH, 3rd generation; Future    Screening for deficiency anemia    Orders:  •  CBC and differential; Future    Screening for hyperlipidemia    Orders:  •  Lipid Panel with Direct LDL reflex; Future    Screening for prostate cancer    Orders:  •  PSA, Total Screen; Future    A/P: Doing ok and and will check labs. Wean the chewing tobacco.  Will complete his HZV. Continue current treatment and RTC six months for routine.        History of Present Illness   WM RTC for f/u Hypothyroidism, HLD, etc. Doing ok and no new issues. Remains active w/o difficulty and no falls. Asthma is controlled and limited rescue MDI use. AZALEA is controlled. Due for labs and vaccines.     Review of Systems   Constitutional:  Negative for activity change, chills, diaphoresis, fatigue and fever.   HENT: Negative.     Eyes:  Negative for visual disturbance.   Respiratory:  Negative for cough, chest tightness, shortness of breath and wheezing.    Cardiovascular:  Negative for chest pain, palpitations and leg swelling.   Gastrointestinal:  Negative for abdominal pain, constipation, diarrhea, nausea and vomiting.   Endocrine: Negative for cold intolerance and heat intolerance.   Genitourinary:  Negative for difficulty  urinating, dysuria and frequency.   Musculoskeletal:  Negative for arthralgias, gait problem and myalgias.   Neurological:  Negative for dizziness, seizures, syncope, weakness, light-headedness and headaches.   Psychiatric/Behavioral:  Negative for confusion, dysphoric mood and sleep disturbance. The patient is not nervous/anxious.        Objective   /72   Pulse 70   Ht 6' (1.829 m)   Wt 115 kg (253 lb)   SpO2 98%   BMI 34.31 kg/m²      Physical Exam  Vitals and nursing note reviewed.   Constitutional:       General: He is not in acute distress.     Appearance: Normal appearance. He is not ill-appearing.   HENT:      Head: Normocephalic and atraumatic.      Mouth/Throat:      Mouth: Mucous membranes are moist.     Eyes:      Extraocular Movements: Extraocular movements intact.      Conjunctiva/sclera: Conjunctivae normal.      Pupils: Pupils are equal, round, and reactive to light.     Neck:      Vascular: No carotid bruit.     Cardiovascular:      Rate and Rhythm: Normal rate and regular rhythm.      Heart sounds: Normal heart sounds. No murmur heard.  Pulmonary:      Effort: Pulmonary effort is normal. No respiratory distress.      Breath sounds: Normal breath sounds. No wheezing, rhonchi or rales.   Abdominal:      General: There is no distension.      Palpations: Abdomen is soft.      Tenderness: There is no abdominal tenderness.     Musculoskeletal:      Cervical back: Neck supple.      Right lower leg: No edema.      Left lower leg: No edema.     Neurological:      General: No focal deficit present.      Mental Status: He is alert and oriented to person, place, and time. Mental status is at baseline.     Psychiatric:         Mood and Affect: Mood normal.         Behavior: Behavior normal.         Thought Content: Thought content normal.         Judgment: Judgment normal.

## 2025-05-16 NOTE — PATIENT INSTRUCTIONS
"Patient Education     High cholesterol   The Basics   Written by the doctors and editors at [facility]   What is cholesterol? -- Cholesterol is a substance found in blood. Everyone has some. It is needed for good health. But people sometimes have too much cholesterol.  Compared with people with normal cholesterol, people with high cholesterol have a higher risk of heart attack, stroke, and other health problems. The higher your cholesterol, the higher your risk of these problems.  Are there different types of cholesterol? -- Yes, there are a few different types. If you get a cholesterol test, you might hear your doctor or nurse talk about:   Total cholesterol   LDL cholesterol - Some people call this the \"bad\" cholesterol. That's because having high LDL levels raises your risk of heart attack, stroke, and other health problems.   HDL cholesterol - Some people call this the \"good\" cholesterol. That's because people with high HDL levels tend to have a lower risk of heart attack, stroke, and other health problems.   Non-HDL cholesterol - Non-HDL cholesterol is your total cholesterol minus your HDL cholesterol.   Triglycerides - Triglycerides are not cholesterol. They are another type of fat. But they often get measured when cholesterol is measured. (Having high triglycerides also seems to increase the risk of heart attack and stroke.)  What should my numbers be? -- Ask your doctor or nurse what your numbers should be. Different people need different goals. If you live outside of the [place], see the table (table 1).  In general, people who do not already have heart disease should aim for:   Total cholesterol below 200   LDL cholesterol below 130, or much lower if they are at risk of heart attack or stroke   HDL cholesterol above 60   Non-HDL cholesterol below 160, or lower if they are at risk of heart attack or stroke   Triglycerides below 150  Remember, though, that many people who cannot meet these goals still have a low " "risk of heart attack and stroke.  What should I do if I have high cholesterol? -- Ask your doctor what your overall risk of heart attack and stroke is. Just having high cholesterol is not always a reason to worry. Having high cholesterol is just one of many things that can increase your risk of heart attack and stroke.  Other things that increase your risk include:   Smoking   High blood pressure   Having a parent or sibling who got heart disease at a young age - Young, in this case, means younger than 55 for males and younger than 65 for females.   A diet that is not heart healthy - A \"heart-healthy\" diet includes lots of fruits and vegetables, fiber, and healthy fats (like those found in fish, nuts, and certain oils). It also means limiting sugar and unhealthy fats.   Older age  If you are at high risk of heart attack and stroke, having high cholesterol is a problem. But if you are at low risk, high cholesterol might not need treatment.  Should I take medicine to lower cholesterol? -- Not everyone who has high cholesterol needs medicines. Your doctor or nurse will decide if you need them based on your age, family history, and other health concerns.  There are many different medicines used to lower cholesterol (table 2). Some help your body make less cholesterol. Some keep your body from absorbing cholesterol from foods. Some help your body get rid of cholesterol faster. The medicines most often used to treat high cholesterol are called \"statins.\"  You should probably take a statin if you:   Already had a heart attack or stroke   Have known heart disease   Have diabetes   Have a condition called \"peripheral artery disease,\" which makes it painful to walk, and happens when the arteries in your legs get clogged with fatty deposits   Have an \"abdominal aortic aneurysm,\" which is a widening of the main artery in the belly  Most people with any of the conditions listed above should take a statin no matter what their " "cholesterol level is. If your doctor or nurse prescribes a statin, it's important to keep taking it. The medicine might not make you feel any different. But it can help prevent heart attack, stroke, and death.  If your doctor or nurse recommends taking medicine to help lower your cholesterol, make sure that you know what it is called. Follow all the instructions for how to take it. For example, some medicines work better when you take them in the evening. Some need to be taken with food.  Tell your doctor or nurse if your medicine causes any side effects that bother you. They might be able to switch you to a different medicine.  Can I lower my cholesterol without medicines? -- Yes. You can help lower your cholesterol by doing these things:   You can lower your LDL, or \"bad,\" cholesterol by avoiding red meat, butter, fried foods, cheese, and other foods that have a lot of saturated fat.   You can lower triglycerides by avoiding sugary foods, fried foods, and excess alcohol.   If you have excess weight, it can help to lose weight. Your doctor or nurse can help you do this in a healthy way.   Try to get regular physical activity. Even gentle forms of exercise, like walking, are good for your health.  Even if these steps don't change your cholesterol very much, they can improve your health in many other ways.  All topics are updated as new evidence becomes available and our peer review process is complete.  This topic retrieved from [employer] on: Mar 01, 2024.  Topic 92639 Version 25.0  Release: 32.2.4 - C32.59  © 2024 UpToDate, Inc. and/or its affiliates. All rights reserved.  table 1: Cholesterol and triglyceride measurements in the [place] and elsewhere     Measurement used within the [place] Milligrams/deciliter (mg/dL)  Measurement used most places outside of the [place] Millimoles/liter (mmol/Liter)     Level to aim for  Level to aim for    Total cholesterol  Below 200 Below 5.17   LDL cholesterol  Below 130, or much lower if at " risk of heart attack and stroke Below 3.36, or much lower if at risk of heart attack and stroke   HDL cholesterol  Above 60 Above 1.55   Triglycerides  Below 150 Below 1.7   Cholesterol is measured differently in the [place] than it is in most other countries. This table shows values used within and outside of the [place]. It includes the cholesterol and triglyceride levels that most people who do not have heart disease should aim for.  Graphic 41536 Version 5.0  table 2: Lipid-lowering medicines  Generic name  Brand name    Statins    Atorvastatin Lipitor   Fluvastatin Lescol, Lescol XL   Lovastatin Mevacor, Altoprev   Pitavastatin Livalo   Pravastatin Pravachol   Rosuvastatin Crestor   Simvastatin Zocor   PCSK9 inhibitors    Alirocumab Praluent   Evolocumab Repatha, Repatha SureClick   Cholesterol absorption inhibitors    Ezetimibe Zetia   Bile acid sequestrants    Cholestyramine Prevalite, Questran, Questran Light   Colesevelam Welchol   Colestipol Colestid   Niacin (nicotinic acid)    Niacin immediate release     Niacin extended release Niaspan   Fibrates    Fenofibrate Fenoglide, Tricor, Triglide, others   Gemfibrozil Lopid   Brand names listed are for medicines available in the [place] and some other countries.  Graphic 10418 Version 7.0  Consumer Information Use and Disclaimer   Disclaimer: This generalized information is a limited summary of diagnosis, treatment, and/or medication information. It is not meant to be comprehensive and should be used as a tool to help the user understand and/or assess potential diagnostic and treatment options. It does NOT include all information about conditions, treatments, medications, side effects, or risks that may apply to a specific patient. It is not intended to be medical advice or a substitute for the medical advice, diagnosis, or treatment of a health care provider based on the health care provider's examination and assessment of a patient's specific and unique circumstances.  Patients must speak with a health care provider for complete information about their health, medical questions, and treatment options, including any risks or benefits regarding use of medications. This information does not endorse any treatments or medications as safe, effective, or approved for treating a specific patient. UpToDate, Inc. and its affiliates disclaim any warranty or liability relating to this information or the use thereof.The use of this information is governed by the Terms of Use, available at https://www.woltersLiveExerciseuwer.com/en/know/clinical-effectiveness-terms. 2024© UpToDate, Inc. and its affiliates and/or licensors. All rights reserved.  Copyright   © 2024 UpToDate, Inc. and/or its affiliates. All rights reserved.

## 2025-05-19 RX ORDER — ROSUVASTATIN CALCIUM 10 MG/1
10 TABLET, COATED ORAL DAILY
Qty: 100 TABLET | Refills: 1 | Status: SHIPPED | OUTPATIENT
Start: 2025-05-19

## 2025-05-19 NOTE — TELEPHONE ENCOUNTER
Relayed results to patient as per provider message. Patient expressed understanding and states he would like to repeat the bmp & tsh in 4 weeks. Patient asking for order to be placed in his chart.   Patient has questions in regards to starting the statin for his increased ldl/tg and is asking if there are side effects and how long he would have to be on the medication.  Please advise. Thank you .

## 2025-05-19 NOTE — TELEPHONE ENCOUNTER
----- Message from Alireza Holley DO sent at 5/16/2025 11:11 PM EDT -----  Call pt, labs ok except TSH is elevated, renal function increased, and elevated LDL/TG. Push po fluids and if agrees, order a bmp in one months. New dose thyroid replacement sent in and will need   repeat TSH in four weeks. See if pt would a statin for the LDL.  ----- Message -----  From: Lab, Background User  Sent: 5/16/2025   5:02 PM EDT  To: Alireza Holley DO

## 2025-06-18 DIAGNOSIS — E78.2 MIXED HYPERLIPIDEMIA: ICD-10-CM

## 2025-06-19 RX ORDER — ICOSAPENT ETHYL 1 G/1
CAPSULE ORAL
Qty: 360 CAPSULE | Refills: 0 | Status: SHIPPED | OUTPATIENT
Start: 2025-06-19